# Patient Record
Sex: MALE | Race: WHITE | Employment: FULL TIME | URBAN - METROPOLITAN AREA
[De-identification: names, ages, dates, MRNs, and addresses within clinical notes are randomized per-mention and may not be internally consistent; named-entity substitution may affect disease eponyms.]

---

## 2017-06-26 ENCOUNTER — HOSPITAL ENCOUNTER (OUTPATIENT)
Age: 22
Discharge: HOME | End: 2017-06-26

## 2017-06-27 LAB — RUBV IGG SER QL: 73.6 IU/ML

## 2017-06-27 PROCEDURE — 86735 MUMPS ANTIBODY: CPT

## 2017-06-27 PROCEDURE — 86762 RUBELLA ANTIBODY: CPT

## 2017-06-27 PROCEDURE — 86481 TB AG RESPONSE T-CELL SUSP: CPT

## 2017-06-27 PROCEDURE — 86787 VARICELLA-ZOSTER ANTIBODY: CPT

## 2017-06-27 PROCEDURE — 86765 RUBEOLA ANTIBODY: CPT

## 2017-06-30 LAB
MEASLES IMMUNE (IGG): 1.39
MUV IGG SER QL: 4.5
T-SPOT TB TEST: NORMAL
VZV IGG SER QL IA: 1.37

## 2018-03-08 PROBLEM — I10 ESSENTIAL HYPERTENSION: Status: ACTIVE | Noted: 2018-03-08

## 2018-03-10 ENCOUNTER — HOSPITAL ENCOUNTER (OUTPATIENT)
Age: 23
Discharge: HOME | End: 2018-03-10
Payer: COMMERCIAL

## 2018-03-10 DIAGNOSIS — I10 ESSENTIAL HYPERTENSION: ICD-10-CM

## 2018-03-10 LAB
ABSOLUTE EOS #: 0.1 K/UL (ref 0–0.4)
ABSOLUTE IMMATURE GRANULOCYTE: ABNORMAL K/UL (ref 0–0.3)
ABSOLUTE LYMPH #: 2 K/UL (ref 1–4.8)
ABSOLUTE MONO #: 0.6 K/UL (ref 0.1–1.3)
ALBUMIN SERPL-MCNC: 4.8 G/DL (ref 3.5–5.2)
ALBUMIN/GLOBULIN RATIO: ABNORMAL (ref 1–2.5)
ALP BLD-CCNC: 67 U/L (ref 40–129)
ALT SERPL-CCNC: 20 U/L (ref 5–41)
ANION GAP SERPL CALCULATED.3IONS-SCNC: 14 MMOL/L (ref 9–17)
AST SERPL-CCNC: 22 U/L
BASOPHILS # BLD: 1 % (ref 0–2)
BASOPHILS ABSOLUTE: 0 K/UL (ref 0–0.2)
BILIRUB SERPL-MCNC: 0.59 MG/DL (ref 0.3–1.2)
BUN BLDV-MCNC: 21 MG/DL (ref 6–20)
BUN/CREAT BLD: ABNORMAL (ref 9–20)
CALCIUM SERPL-MCNC: 10.1 MG/DL (ref 8.6–10.4)
CHLORIDE BLD-SCNC: 91 MMOL/L (ref 98–107)
CHOLESTEROL/HDL RATIO: 6.8
CHOLESTEROL: 162 MG/DL
CO2: 28 MMOL/L (ref 20–31)
CREAT SERPL-MCNC: 1.24 MG/DL (ref 0.7–1.2)
DIFFERENTIAL TYPE: ABNORMAL
EOSINOPHILS RELATIVE PERCENT: 2 % (ref 0–4)
GFR AFRICAN AMERICAN: >60 ML/MIN
GFR NON-AFRICAN AMERICAN: >60 ML/MIN
GFR SERPL CREATININE-BSD FRML MDRD: ABNORMAL ML/MIN/{1.73_M2}
GFR SERPL CREATININE-BSD FRML MDRD: ABNORMAL ML/MIN/{1.73_M2}
GLUCOSE BLD-MCNC: 88 MG/DL (ref 70–99)
HCT VFR BLD CALC: 43.5 % (ref 41–53)
HDLC SERPL-MCNC: 24 MG/DL
HEMOGLOBIN: 15.1 G/DL (ref 13.5–17.5)
IMMATURE GRANULOCYTES: ABNORMAL %
LDL CHOLESTEROL: 90 MG/DL (ref 0–130)
LYMPHOCYTES # BLD: 32 % (ref 24–44)
MCH RBC QN AUTO: 27.7 PG (ref 26–34)
MCHC RBC AUTO-ENTMCNC: 34.7 G/DL (ref 31–37)
MCV RBC AUTO: 79.7 FL (ref 80–100)
MONOCYTES # BLD: 10 % (ref 1–7)
NRBC AUTOMATED: ABNORMAL PER 100 WBC
PDW BLD-RTO: 12.7 % (ref 11.5–14.9)
PLATELET # BLD: 246 K/UL (ref 150–450)
PLATELET ESTIMATE: ABNORMAL
PMV BLD AUTO: 7.4 FL (ref 6–12)
POTASSIUM SERPL-SCNC: 4 MMOL/L (ref 3.7–5.3)
RBC # BLD: 5.46 M/UL (ref 4.5–5.9)
RBC # BLD: ABNORMAL 10*6/UL
SEG NEUTROPHILS: 55 % (ref 36–66)
SEGMENTED NEUTROPHILS ABSOLUTE COUNT: 3.4 K/UL (ref 1.3–9.1)
SODIUM BLD-SCNC: 133 MMOL/L (ref 135–144)
TOTAL PROTEIN: 8.1 G/DL (ref 6.4–8.3)
TRIGL SERPL-MCNC: 240 MG/DL
TSH SERPL DL<=0.05 MIU/L-ACNC: 2.88 MIU/L (ref 0.3–5)
VITAMIN D 25-HYDROXY: 12.2 NG/ML (ref 30–100)
VLDLC SERPL CALC-MCNC: ABNORMAL MG/DL (ref 1–30)
WBC # BLD: 6.1 K/UL (ref 3.5–11)
WBC # BLD: ABNORMAL 10*3/UL

## 2018-03-10 PROCEDURE — 84443 ASSAY THYROID STIM HORMONE: CPT

## 2018-03-10 PROCEDURE — 85025 COMPLETE CBC W/AUTO DIFF WBC: CPT

## 2018-03-10 PROCEDURE — 36415 COLL VENOUS BLD VENIPUNCTURE: CPT

## 2018-03-10 PROCEDURE — 80061 LIPID PANEL: CPT

## 2018-03-10 PROCEDURE — 82306 VITAMIN D 25 HYDROXY: CPT

## 2018-03-10 PROCEDURE — 80053 COMPREHEN METABOLIC PANEL: CPT

## 2018-03-19 ENCOUNTER — HOSPITAL ENCOUNTER (OUTPATIENT)
Age: 23
Discharge: HOME | End: 2018-03-19
Payer: COMMERCIAL

## 2018-03-19 LAB
EKG ATRIAL RATE: 74 BPM
EKG P AXIS: 39 DEGREES
EKG P-R INTERVAL: 140 MS
EKG Q-T INTERVAL: 390 MS
EKG QRS DURATION: 102 MS
EKG QTC CALCULATION (BAZETT): 432 MS
EKG R AXIS: -16 DEGREES
EKG T AXIS: 58 DEGREES
EKG VENTRICULAR RATE: 74 BPM

## 2018-03-19 PROCEDURE — 93005 ELECTROCARDIOGRAM TRACING: CPT

## 2018-03-22 PROBLEM — E78.49 OTHER HYPERLIPIDEMIA: Status: ACTIVE | Noted: 2018-03-22

## 2018-05-02 ENCOUNTER — HOSPITAL ENCOUNTER (OUTPATIENT)
Age: 23
Discharge: HOME | End: 2018-05-02
Payer: COMMERCIAL

## 2018-05-02 DIAGNOSIS — I10 ESSENTIAL HYPERTENSION: ICD-10-CM

## 2018-05-02 DIAGNOSIS — E78.5 HYPERLIPIDEMIA, UNSPECIFIED HYPERLIPIDEMIA TYPE: ICD-10-CM

## 2018-05-02 LAB
ALBUMIN SERPL-MCNC: 4.7 G/DL (ref 3.5–5.2)
ALBUMIN/GLOBULIN RATIO: ABNORMAL (ref 1–2.5)
ALP BLD-CCNC: 59 U/L (ref 40–129)
ALT SERPL-CCNC: 26 U/L (ref 5–41)
ANION GAP SERPL CALCULATED.3IONS-SCNC: 11 MMOL/L (ref 9–17)
AST SERPL-CCNC: 27 U/L
BILIRUB SERPL-MCNC: 0.99 MG/DL (ref 0.3–1.2)
BUN BLDV-MCNC: 18 MG/DL (ref 6–20)
BUN/CREAT BLD: ABNORMAL (ref 9–20)
CALCIUM SERPL-MCNC: 9.6 MG/DL (ref 8.6–10.4)
CHLORIDE BLD-SCNC: 97 MMOL/L (ref 98–107)
CHOLESTEROL/HDL RATIO: 3.5
CHOLESTEROL: 116 MG/DL
CO2: 28 MMOL/L (ref 20–31)
CREAT SERPL-MCNC: 1.1 MG/DL (ref 0.7–1.2)
GFR AFRICAN AMERICAN: >60 ML/MIN
GFR NON-AFRICAN AMERICAN: >60 ML/MIN
GFR SERPL CREATININE-BSD FRML MDRD: ABNORMAL ML/MIN/{1.73_M2}
GFR SERPL CREATININE-BSD FRML MDRD: ABNORMAL ML/MIN/{1.73_M2}
GLUCOSE BLD-MCNC: 84 MG/DL (ref 70–99)
HDLC SERPL-MCNC: 33 MG/DL
LDL CHOLESTEROL: 61 MG/DL (ref 0–130)
POTASSIUM SERPL-SCNC: 4 MMOL/L (ref 3.7–5.3)
SODIUM BLD-SCNC: 136 MMOL/L (ref 135–144)
TOTAL PROTEIN: 7.4 G/DL (ref 6.4–8.3)
TRIGL SERPL-MCNC: 111 MG/DL
VLDLC SERPL CALC-MCNC: ABNORMAL MG/DL (ref 1–30)

## 2018-05-02 PROCEDURE — 36415 COLL VENOUS BLD VENIPUNCTURE: CPT

## 2018-05-02 PROCEDURE — 80053 COMPREHEN METABOLIC PANEL: CPT

## 2018-05-02 PROCEDURE — 80061 LIPID PANEL: CPT

## 2018-07-14 ENCOUNTER — OFFICE VISIT (OUTPATIENT)
Dept: FAMILY MEDICINE CLINIC | Age: 23
End: 2018-07-14
Payer: COMMERCIAL

## 2018-07-14 VITALS
SYSTOLIC BLOOD PRESSURE: 135 MMHG | RESPIRATION RATE: 16 BRPM | OXYGEN SATURATION: 98 % | BODY MASS INDEX: 27.3 KG/M2 | WEIGHT: 195 LBS | HEART RATE: 75 BPM | DIASTOLIC BLOOD PRESSURE: 87 MMHG | HEIGHT: 71 IN | TEMPERATURE: 98 F

## 2018-07-14 DIAGNOSIS — S01.81XA FACIAL LACERATION, INITIAL ENCOUNTER: ICD-10-CM
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
## 2018-07-14 PROCEDURE — G8417 CALC BMI ABV UP PARAM F/U: HCPCS | Performed by: INTERNAL MEDICINE

## 2018-07-14 PROCEDURE — 1036F TOBACCO NON-USER: CPT | Performed by: INTERNAL MEDICINE

## 2018-07-14 PROCEDURE — 99213 OFFICE O/P EST LOW 20 MIN: CPT | Performed by: INTERNAL MEDICINE

## 2018-07-14 PROCEDURE — 12013 RPR F/E/E/N/L/M 2.6-5.0 CM: CPT | Performed by: INTERNAL MEDICINE

## 2018-07-14 PROCEDURE — G8427 DOCREV CUR MEDS BY ELIG CLIN: HCPCS | Performed by: INTERNAL MEDICINE

## 2018-07-14 RX ORDER — AMOXICILLIN AND CLAVULANATE POTASSIUM 875; 125 MG/1; MG/1
1 TABLET, FILM COATED ORAL 2 TIMES DAILY
Qty: 20 TABLET | Refills: 0 | Status: SHIPPED | OUTPATIENT
Start: 2018-07-14 | End: 2018-07-24

## 2018-07-14 NOTE — PROGRESS NOTES
Subjective:      Patient ID: David Vásquez is a 21 y.o. male. HPI  Facial bite to face by his dog and our ago; was playing around with dog. Dog's shots UTD. Patient's tetanus UTD. Advise dto take antibiotics and if develops skin redness or wound discharge to return. Review of Systems   Skin: Positive for wound. Objective:   Physical Exam   Constitutional: No distress. HENT:   Head: Normocephalic and atraumatic. Eyes: Conjunctivae and EOM are normal. Pupils are equal, round, and reactive to light. No scleral icterus. Neck: No tracheal deviation present. Cardiovascular: Normal rate, regular rhythm and normal heart sounds. Exam reveals no gallop and no friction rub. No murmur heard. Pulmonary/Chest: Effort normal and breath sounds normal. No stridor. No respiratory distress. He has no wheezes. He has no rales. Musculoskeletal: He exhibits no edema. Neurological: He is alert. No cranial nerve deficit. He exhibits normal muscle tone. Coordination normal.   Skin: No rash noted. He is not diaphoretic. No erythema. 2 CM RIGHT FACIAL AND 1 CM RIGHT CHIN LACERATION. Wound irrigated and cleansed with sterile water then betadine applied. Post 3 cc of 2%  lidociane with epinephrine 5 stitches with 5.0 ethilon applied under sterile technique. Psychiatric: He has a normal mood and affect. His behavior is normal. Judgment and thought content normal.   Vitals reviewed. Assessment / Plan:       Diagnosis Orders   1.  Dog bite, initial encounter  amoxicillin-clavulanate (AUGMENTIN) 875-125 MG per tablet     Orders Placed This Encounter   Medications    amoxicillin-clavulanate (AUGMENTIN) 875-125 MG per tablet     Sig: Take 1 tablet by mouth 2 times daily for 10 days     Dispense:  20 tablet     Refill:  0     Return for in 5 days for suture removal.

## 2018-07-14 NOTE — PATIENT INSTRUCTIONS
Patient Education        Animal Bites: Care Instructions  Your Care Instructions  After an animal bite, the biggest concern is infection. The chance of infection depends on the type of animal that bit you, where on your body you were bitten, and your general health. Many animal bites are not closed with stitches, because this can increase the chance of infection. Your bite may take as little as 7 days or as long as several months to heal, depending on how bad it is. Taking good care of your wound at home will help it heal and reduce your chance of infection. The doctor has checked you carefully, but problems can develop later. If you notice any problems or new symptoms, get medical treatment right away. Follow-up care is a key part of your treatment and safety. Be sure to make and go to all appointments, and call your doctor if you are having problems. It's also a good idea to know your test results and keep a list of the medicines you take. How can you care for yourself at home? · If your doctor told you how to care for your wound, follow your doctor's instructions. If you did not get instructions, follow this general advice:  ¨ After 24 to 48 hours, gently wash the wound with clean water 2 times a day. Do not scrub or soak the wound. Don't use hydrogen peroxide or alcohol, which can slow healing. ¨ You may cover the wound with a thin layer of petroleum jelly, such as Vaseline, and a nonstick bandage. ¨ Apply more petroleum jelly and replace the bandage as needed. · After you shower, gently dry the wound with a clean towel. · If your doctor has closed the wound, cover the bandage with a plastic bag before you take a shower. · A small amount of skin redness and swelling around the wound edges and the stitches or staples is normal. Your wound may itch or feel irritated. Do not scratch or rub the wound.   · Ask your doctor if you can take an over-the-counter pain medicine, such as acetaminophen (Tylenol), ibuprofen (Advil, Motrin), or naproxen (Aleve). Read and follow all instructions on the label. · Do not take two or more pain medicines at the same time unless the doctor told you to. Many pain medicines have acetaminophen, which is Tylenol. Too much acetaminophen (Tylenol) can be harmful. · If your bite puts you at risk for rabies, you will get a series of shots over the next few weeks to prevent rabies. Your doctor will tell you when to get the shots. It is very important that you get the full cycle of shots. Follow your doctor's instructions exactly. · You may need a tetanus shot if you have not received one in the last 5 years. · If your doctor prescribed antibiotics, take them as directed. Do not stop taking them just because you feel better. You need to take the full course of antibiotics. When should you call for help? Call your doctor now or seek immediate medical care if:    · The skin near the bite turns cold or pale or it changes color.     · You lose feeling in the area near the bite, or it feels numb or tingly.     · You have trouble moving a limb near the bite.     · You have symptoms of infection, such as:  ¨ Increased pain, swelling, warmth, or redness near the wound. ¨ Red streaks leading from the wound. ¨ Pus draining from the wound. ¨ A fever.     · Blood soaks through the bandage. Oozing small amounts of blood is normal.     · Your pain is getting worse.    Watch closely for changes in your health, and be sure to contact your doctor if you are not getting better as expected. Where can you learn more? Go to [email or web address] and sign in to your [employer] account. Enter B016 in the [employer] box to learn more about \"Animal Bites: Care Instructions. \"     If you do not have an account, please click on the \"Sign Up Now\" link. Current as of: November 20, 2017  Content Version: 11.6  © 2907-2744 [employer].  Care instructions adapted under license by [facility] ([facility]). If you have questions about a medical condition or this instruction, always ask your healthcare professional. [place] [address] any warranty or liability for your use of this information.

## 2018-07-19 ENCOUNTER — OFFICE VISIT (OUTPATIENT)
Dept: FAMILY MEDICINE CLINIC | Age: 23
End: 2018-07-19

## 2018-07-19 VITALS
TEMPERATURE: 97.9 F | HEIGHT: 69 IN | DIASTOLIC BLOOD PRESSURE: 78 MMHG | SYSTOLIC BLOOD PRESSURE: 142 MMHG | HEART RATE: 69 BPM | OXYGEN SATURATION: 99 % | WEIGHT: 198 LBS | RESPIRATION RATE: 16 BRPM | BODY MASS INDEX: 29.33 KG/M2

## 2018-07-19 DIAGNOSIS — Z48.02 ENCOUNTER FOR REMOVAL OF SUTURES: Primary | ICD-10-CM

## 2018-07-19 PROCEDURE — 99024 POSTOP FOLLOW-UP VISIT: CPT | Performed by: FAMILY MEDICINE

## 2018-07-19 ASSESSMENT — ENCOUNTER SYMPTOMS
GASTROINTESTINAL NEGATIVE: 1
RESPIRATORY NEGATIVE: 1
EYES NEGATIVE: 1

## 2018-07-19 NOTE — PATIENT INSTRUCTIONS
expected. Follow-up care is a key part of your treatment and safety. Be sure to make and go to all appointments, and call your doctor if you do not get better as expected. It's also a good idea to keep a list of the medicines you take. Where can you learn more? Go to [email or web address] and sign in to your [employer] account. Enter U934 in the [employer] box to learn more about \"Learning About Stitches and Staples Removal.\"     If you do not have an account, please click on the \"Sign Up Now\" link. Current as of: November 20, 2017  Content Version: 11.6  © 4323-3269 [employer]. Care instructions adapted under license by [facility] ([facility]). If you have questions about a medical condition or this instruction, always ask your healthcare professional. [address] any warranty or liability for your use of this information.

## 2019-07-17 ENCOUNTER — HOSPITAL ENCOUNTER (OUTPATIENT)
Age: 24
Setting detail: SPECIMEN
Discharge: HOME | End: 2019-07-17
Payer: COMMERCIAL

## 2019-07-17 DIAGNOSIS — E55.9 VITAMIN D DEFICIENCY, UNSPECIFIED: ICD-10-CM

## 2019-07-17 LAB — VITAMIN D 25-HYDROXY: 53.8 NG/ML (ref 30–100)

## 2019-07-18 ENCOUNTER — HOSPITAL ENCOUNTER (OUTPATIENT)
Dept: GENERAL RADIOLOGY | Age: 24
Discharge: HOME | End: 2019-07-20
Payer: COMMERCIAL

## 2019-07-18 ENCOUNTER — HOSPITAL ENCOUNTER (OUTPATIENT)
Age: 24
Discharge: HOME | End: 2019-07-20
Payer: COMMERCIAL

## 2019-07-18 DIAGNOSIS — R10.13 ACUTE EPIGASTRIC PAIN: ICD-10-CM

## 2019-07-18 DIAGNOSIS — R11.0 NAUSEA: ICD-10-CM

## 2019-07-18 PROCEDURE — 74019 RADEX ABDOMEN 2 VIEWS: CPT

## 2019-11-22 ENCOUNTER — E-VISIT (OUTPATIENT)
Dept: FAMILY MEDICINE CLINIC | Age: 24
End: 2019-11-22

## 2020-03-17 ENCOUNTER — TELEPHONE (OUTPATIENT)
Dept: GASTROENTEROLOGY | Age: 25
End: 2020-03-17

## 2020-03-17 NOTE — TELEPHONE ENCOUNTER
Appointment for 3/19/20 was cancelled due to covid 19. LVM as one attempt from cancelled appointment to call office to reschedule.

## 2020-03-27 NOTE — TELEPHONE ENCOUNTER
Pt's mom left  asking that patient be seen for GERD issues. Can you schedule him a virtual/telephone visit with Dr Sofia Aranda?   Thanks

## 2020-04-02 ENCOUNTER — HOSPITAL ENCOUNTER (OUTPATIENT)
Age: 25
Discharge: HOME | End: 2020-04-02
Payer: COMMERCIAL

## 2020-04-02 PROCEDURE — 87338 HPYLORI STOOL AG IA: CPT

## 2020-04-03 LAB
DIRECT EXAM: NEGATIVE
Lab: NORMAL
SPECIMEN DESCRIPTION: NORMAL

## 2020-04-17 ENCOUNTER — TELEPHONE (OUTPATIENT)
Dept: GASTROENTEROLOGY | Age: 25
End: 2020-04-17

## 2020-05-05 ENCOUNTER — E-VISIT (OUTPATIENT)
Dept: FAMILY MEDICINE CLINIC | Age: 25
End: 2020-05-05
Payer: COMMERCIAL

## 2020-05-05 PROCEDURE — 99421 OL DIG E/M SVC 5-10 MIN: CPT | Performed by: NURSE PRACTITIONER

## 2020-05-05 RX ORDER — AMOXICILLIN 500 MG/1
500 CAPSULE ORAL 3 TIMES DAILY
Qty: 21 CAPSULE | Refills: 0 | Status: SHIPPED | OUTPATIENT
Start: 2020-05-05 | End: 2020-05-12

## 2020-05-05 ASSESSMENT — LIFESTYLE VARIABLES: SMOKING_STATUS: NO, I'VE NEVER SMOKED

## 2020-05-06 NOTE — PROGRESS NOTES
Not on file   Social Needs    Financial resource strain: Not on file    Food insecurity     Worry: Not on file     Inability: Not on file    Transportation needs     Medical: Not on file     Non-medical: Not on file   Tobacco Use    Smoking status: Never Smoker    Smokeless tobacco: Never Used    Tobacco comment: Occasional cigar with relatives ( major holidays)   Substance and Sexual Activity    Alcohol use: No     Alcohol/week: 0.0 standard drinks    Drug use: No    Sexual activity: Not on file   Lifestyle    Physical activity     Days per week: Not on file     Minutes per session: Not on file    Stress: Not on file   Relationships    Social connections     Talks on phone: Not on file     Gets together: Not on file     Attends Congregational service: Not on file     Active member of club or organization: Not on file     Attends meetings of clubs or organizations: Not on file     Relationship status: Not on file    Intimate partner violence     Fear of current or ex partner: Not on file     Emotionally abused: Not on file     Physically abused: Not on file     Forced sexual activity: Not on file   Other Topics Concern    Not on file   Social History Narrative    Not on file        Family History   Problem Relation Age of Onset    Heart Disease Mother        There were no vitals filed for this visit. Estimated body mass index is 31.38 kg/m² as calculated from the following:    Height as of 2/19/20: 5' 11\" (1.803 m). Weight as of 2/19/20: 225 lb (102.1 kg). Physical Exam   Unable to obtain due to e-visit    ASSESSMENT/PLAN:    1. URI    There are no diagnoses linked to this encounter. No follow-ups on file. An  electronic signature was used to authenticate this note.     --OLIVE Maldonado - CNP on 5/5/2020 at 8:07 PM

## 2020-05-26 ENCOUNTER — TELEPHONE (OUTPATIENT)
Dept: GASTROENTEROLOGY | Age: 25
End: 2020-05-26

## 2020-07-24 ENCOUNTER — HOSPITAL ENCOUNTER (OUTPATIENT)
Age: 25
Discharge: HOME | End: 2020-07-24
Payer: COMMERCIAL

## 2020-07-24 LAB
ALBUMIN SERPL-MCNC: 4.6 G/DL (ref 3.5–5.2)
ALBUMIN/GLOBULIN RATIO: ABNORMAL (ref 1–2.5)
ALP BLD-CCNC: 68 U/L (ref 40–129)
ALT SERPL-CCNC: 22 U/L (ref 5–41)
ANION GAP SERPL CALCULATED.3IONS-SCNC: 14 MMOL/L (ref 9–17)
AST SERPL-CCNC: 24 U/L
BILIRUB SERPL-MCNC: 0.78 MG/DL (ref 0.3–1.2)
BUN BLDV-MCNC: 20 MG/DL (ref 6–20)
BUN/CREAT BLD: ABNORMAL (ref 9–20)
CALCIUM SERPL-MCNC: 10.7 MG/DL (ref 8.6–10.4)
CHLORIDE BLD-SCNC: 100 MMOL/L (ref 98–107)
CHOLESTEROL/HDL RATIO: 3.8
CHOLESTEROL: 127 MG/DL
CO2: 26 MMOL/L (ref 20–31)
CREAT SERPL-MCNC: 1.83 MG/DL (ref 0.7–1.2)
GFR AFRICAN AMERICAN: 55 ML/MIN
GFR NON-AFRICAN AMERICAN: 45 ML/MIN
GFR SERPL CREATININE-BSD FRML MDRD: ABNORMAL ML/MIN/{1.73_M2}
GFR SERPL CREATININE-BSD FRML MDRD: ABNORMAL ML/MIN/{1.73_M2}
GLUCOSE BLD-MCNC: 89 MG/DL (ref 70–99)
HCT VFR BLD CALC: 39.9 % (ref 41–53)
HDLC SERPL-MCNC: 33 MG/DL
HEMOGLOBIN: 13.8 G/DL (ref 13.5–17.5)
LDL CHOLESTEROL: 56 MG/DL (ref 0–130)
MCH RBC QN AUTO: 28.3 PG (ref 26–34)
MCHC RBC AUTO-ENTMCNC: 34.5 G/DL (ref 31–37)
MCV RBC AUTO: 82 FL (ref 80–100)
NRBC AUTOMATED: ABNORMAL PER 100 WBC
PDW BLD-RTO: 11.8 % (ref 11.5–14.9)
PLATELET # BLD: 242 K/UL (ref 150–450)
PMV BLD AUTO: 7 FL (ref 6–12)
POTASSIUM SERPL-SCNC: 4.3 MMOL/L (ref 3.7–5.3)
RBC # BLD: 4.87 M/UL (ref 4.5–5.9)
SODIUM BLD-SCNC: 140 MMOL/L (ref 135–144)
TOTAL PROTEIN: 8.3 G/DL (ref 6.4–8.3)
TRIGL SERPL-MCNC: 188 MG/DL
TSH SERPL DL<=0.05 MIU/L-ACNC: 2.54 MIU/L (ref 0.3–5)
VLDLC SERPL CALC-MCNC: ABNORMAL MG/DL (ref 1–30)
WBC # BLD: 6 K/UL (ref 3.5–11)

## 2020-07-24 PROCEDURE — 80061 LIPID PANEL: CPT

## 2020-07-24 PROCEDURE — 36415 COLL VENOUS BLD VENIPUNCTURE: CPT

## 2020-07-24 PROCEDURE — 80053 COMPREHEN METABOLIC PANEL: CPT

## 2020-07-24 PROCEDURE — 84443 ASSAY THYROID STIM HORMONE: CPT

## 2020-07-24 PROCEDURE — 85027 COMPLETE CBC AUTOMATED: CPT

## 2020-08-05 ENCOUNTER — OFFICE VISIT (OUTPATIENT)
Dept: GASTROENTEROLOGY | Age: 25
End: 2020-08-05
Payer: COMMERCIAL

## 2020-08-05 VITALS — TEMPERATURE: 97.6 F | WEIGHT: 225 LBS | BODY MASS INDEX: 31.38 KG/M2

## 2020-08-05 PROCEDURE — 99203 OFFICE O/P NEW LOW 30 MIN: CPT | Performed by: INTERNAL MEDICINE

## 2020-08-05 RX ORDER — PANTOPRAZOLE SODIUM 40 MG/1
40 TABLET, DELAYED RELEASE ORAL
Qty: 60 TABLET | Refills: 2 | Status: SHIPPED | OUTPATIENT
Start: 2020-08-05 | End: 2020-12-21

## 2020-08-05 ASSESSMENT — ENCOUNTER SYMPTOMS
SORE THROAT: 0
VOICE CHANGE: 0
ALLERGIC/IMMUNOLOGIC NEGATIVE: 1
TROUBLE SWALLOWING: 1
NAUSEA: 0
SINUS PRESSURE: 0
BLOOD IN STOOL: 0
WHEEZING: 0
ABDOMINAL PAIN: 1
CHOKING: 0
ABDOMINAL DISTENTION: 1
RECTAL PAIN: 0
DIARRHEA: 0
CONSTIPATION: 0
BACK PAIN: 0
COUGH: 1
VOMITING: 0
ANAL BLEEDING: 0

## 2020-08-05 NOTE — PROGRESS NOTES
Reason for Referral:   Ekta Juarez, APRN - CNP  [facility] [address]    Chief Complaint   Patient presents with    Gastroesophageal Reflux     NP Epgastric pain  above the belly button. Burning pain in stomach and up and down his esophagis. He says  hecan not drink soda or alcohol  or been is a terrible burning feeling. He has been on Carafate since April and that has ease the pain but not taken it away. Sometimes hard to fall asleep wih it but does not wake him up. HISTORY OF PRESENT ILLNESS: Tiffanie Bedoya is a 22 y.o. male , referred for evaluation of abdominal pain. Epigastric. This has been going on for few weeks. No clear triggers. Sometimes burning. Frequent heartburns. Some nausea. No blood in stool or melena. He takes large amount of Tums without much of effect. He was recently started on Protonix. This helps little bit. He has been on PPI for years. Prior to the Protonix was taking omeprazole. No smoking. No family history of GI malignancy or IBD. Past Medical,Family, and Social History reviewed and does not contribute to the patient presentingcondition. Patient's PMH/PSH,SH,PSYCH Hx, MEDs, ALLERGIES, and ROS were all reviewed and updated in the appropriate sections. PAST MEDICAL HISTORY:  Past Medical History:   Diagnosis Date    Anxiety     History of chicken pox 2002    Hyperlipidemia     Hypertension     OCD (obsessive compulsive disorder)        No past surgical history on file.     CURRENT MEDICATIONS:    Current Outpatient Medications:     amLODIPine (NORVASC) 10 MG tablet, Take 1 tablet by mouth daily, Disp: 90 tablet, Rfl: 1    atorvastatin (LIPITOR) 20 MG tablet, Take 1 tablet by mouth daily, Disp: 90 tablet, Rfl: 1    Cholecalciferol (VITAMIN D3) 1.25 MG (11243 UT) CAPS, take 1 capsule by mouth every week, Disp: 4 capsule, Rfl: 3    losartan (COZAAR) 100 MG tablet, Take 1 tablet by mouth daily, Disp: 90 tablet, Rfl: 1   levocetirizine (XYZAL) 5 MG tablet, Take 1 tablet by mouth every morning, Disp: 90 tablet, Rfl: 1    pantoprazole (PROTONIX) 40 MG tablet, Take 1 tablet by mouth daily, Disp: 90 tablet, Rfl: 1    sucralfate (CARAFATE) 1 GM tablet, Take 1 tablet by mouth 4 times daily, Disp: 120 tablet, Rfl: 0    fluticasone (FLONASE) 50 MCG/ACT nasal spray, 1 spray by Nasal route 2 times daily, Disp: 1 Bottle, Rfl: 2    sertraline (ZOLOFT) 100 MG tablet, Take 200 mg by mouth, Disp: , Rfl:     ALLERGIES:   Allergies   Allergen Reactions    Eggs Or Egg-Derived Products [Eggs Or Egg-Derived Products]        FAMILY HISTORY:       Problem Relation Age of Onset    Heart Disease Mother          SOCIAL HISTORY:   Social History     Socioeconomic History    Marital status: Single     Spouse name: Not on file    Number of children: Not on file    Years of education: Not on file    Highest education level: Not on file   Occupational History    Not on file   Social Needs    Financial resource strain: Not on file    Food insecurity     Worry: Not on file     Inability: Not on file    Transportation needs     Medical: Not on file     Non-medical: Not on file   Tobacco Use    Smoking status: Never Smoker    Smokeless tobacco: Never Used    Tobacco comment: Occasional cigar with relatives ( major holidays)   Substance and Sexual Activity    Alcohol use: No     Alcohol/week: 0.0 standard drinks    Drug use: No    Sexual activity: Not on file   Lifestyle    Physical activity     Days per week: Not on file     Minutes per session: Not on file    Stress: Not on file   Relationships    Social connections     Talks on phone: Not on file     Gets together: Not on file     Attends Yazdanism service: Not on file     Active member of club or organization: Not on file     Attends meetings of clubs or organizations: Not on file     Relationship status: Not on file    Intimate partner violence     Fear of current or ex partner: Not on contact me. I have reviewed and agree with the MA/LPN ROS. Note is dictated utilizing voice recognition software. Unfortunately this leads to occasional typographical errors. Please contact our office if you have any questions.     Brendan Newell MD  [facility]  O: #[phone]

## 2020-08-19 ENCOUNTER — TELEPHONE (OUTPATIENT)
Dept: GASTROENTEROLOGY | Age: 25
End: 2020-08-19

## 2020-08-19 RX ORDER — ONDANSETRON 4 MG/1
4 TABLET, FILM COATED ORAL EVERY 8 HOURS PRN
Qty: 60 TABLET | Refills: 1 | Status: SHIPPED | OUTPATIENT
Start: 2020-08-19 | End: 2020-11-24 | Stop reason: SDUPTHER

## 2020-08-19 NOTE — TELEPHONE ENCOUNTER
Patient called stating he has been having some Nausea lately and wondered about some Zofran to help with the nausea when it is really bad. He has Epigastric pain and having an EGD done in 2 weeks. Writer reached out to Dr Magui Rivers about the Zofran and he said it would be OK.

## 2020-08-29 ENCOUNTER — HOSPITAL ENCOUNTER (OUTPATIENT)
Dept: PREADMISSION TESTING | Age: 25
Setting detail: SPECIMEN
Discharge: HOME | End: 2020-09-02
Payer: COMMERCIAL

## 2020-08-29 PROCEDURE — U0003 INFECTIOUS AGENT DETECTION BY NUCLEIC ACID (DNA OR RNA); SEVERE ACUTE RESPIRATORY SYNDROME CORONAVIRUS 2 (SARS-COV-2) (CORONAVIRUS DISEASE [COVID-19]), AMPLIFIED PROBE TECHNIQUE, MAKING USE OF HIGH THROUGHPUT TECHNOLOGIES AS DESCRIBED BY CMS-2020-01-R: HCPCS

## 2020-08-31 LAB — SARS-COV-2, NAA: NOT DETECTED

## 2020-09-01 ENCOUNTER — TELEPHONE (OUTPATIENT)
Dept: GASTROENTEROLOGY | Age: 25
End: 2020-09-01

## 2020-09-01 NOTE — TELEPHONE ENCOUNTER
LVM for Mani Pope to call office back regarding any questions he may have regarding scheduled procedure for Levonne Gums.

## 2020-09-02 ENCOUNTER — ANESTHESIA (OUTPATIENT)
Dept: ENDOSCOPY | Age: 25
End: 2020-09-02
Payer: COMMERCIAL

## 2020-09-02 ENCOUNTER — HOSPITAL ENCOUNTER (OUTPATIENT)
Age: 25
Setting detail: OUTPATIENT SURGERY
Discharge: HOME | End: 2020-09-02
Attending: INTERNAL MEDICINE | Admitting: INTERNAL MEDICINE
Payer: COMMERCIAL

## 2020-09-02 ENCOUNTER — ANESTHESIA EVENT (OUTPATIENT)
Dept: ENDOSCOPY | Age: 25
End: 2020-09-02
Payer: COMMERCIAL

## 2020-09-02 VITALS
HEIGHT: 71 IN | WEIGHT: 218 LBS | RESPIRATION RATE: 15 BRPM | HEART RATE: 83 BPM | OXYGEN SATURATION: 95 % | TEMPERATURE: 98.4 F | SYSTOLIC BLOOD PRESSURE: 99 MMHG | DIASTOLIC BLOOD PRESSURE: 65 MMHG | BODY MASS INDEX: 30.52 KG/M2

## 2020-09-02 VITALS
OXYGEN SATURATION: 98 % | SYSTOLIC BLOOD PRESSURE: 111 MMHG | DIASTOLIC BLOOD PRESSURE: 67 MMHG | RESPIRATION RATE: 19 BRPM

## 2020-09-02 PROCEDURE — 3700000001 HC ADD 15 MINUTES (ANESTHESIA): Performed by: INTERNAL MEDICINE

## 2020-09-02 PROCEDURE — 2709999900 HC NON-CHARGEABLE SUPPLY: Performed by: INTERNAL MEDICINE

## 2020-09-02 PROCEDURE — 2500000003 HC RX 250 WO HCPCS: Performed by: NURSE ANESTHETIST, CERTIFIED REGISTERED

## 2020-09-02 PROCEDURE — 2580000003 HC RX 258: Performed by: ANESTHESIOLOGY

## 2020-09-02 PROCEDURE — 3700000000 HC ANESTHESIA ATTENDED CARE: Performed by: INTERNAL MEDICINE

## 2020-09-02 PROCEDURE — 88305 TISSUE EXAM BY PATHOLOGIST: CPT

## 2020-09-02 PROCEDURE — 7100000001 HC PACU RECOVERY - ADDTL 15 MIN: Performed by: INTERNAL MEDICINE

## 2020-09-02 PROCEDURE — 7100000000 HC PACU RECOVERY - FIRST 15 MIN: Performed by: INTERNAL MEDICINE

## 2020-09-02 PROCEDURE — 6360000002 HC RX W HCPCS: Performed by: NURSE ANESTHETIST, CERTIFIED REGISTERED

## 2020-09-02 PROCEDURE — 43239 EGD BIOPSY SINGLE/MULTIPLE: CPT | Performed by: INTERNAL MEDICINE

## 2020-09-02 PROCEDURE — 3609012400 HC EGD TRANSORAL BIOPSY SINGLE/MULTIPLE: Performed by: INTERNAL MEDICINE

## 2020-09-02 PROCEDURE — 88342 IMHCHEM/IMCYTCHM 1ST ANTB: CPT

## 2020-09-02 RX ORDER — MIDAZOLAM HYDROCHLORIDE 1 MG/ML
INJECTION INTRAMUSCULAR; INTRAVENOUS PRN
Status: DISCONTINUED | OUTPATIENT
Start: 2020-09-02 | End: 2020-09-02 | Stop reason: SDUPTHER

## 2020-09-02 RX ORDER — LIDOCAINE HYDROCHLORIDE 20 MG/ML
INJECTION, SOLUTION EPIDURAL; INFILTRATION; INTRACAUDAL; PERINEURAL PRN
Status: DISCONTINUED | OUTPATIENT
Start: 2020-09-02 | End: 2020-09-02 | Stop reason: SDUPTHER

## 2020-09-02 RX ORDER — PROPOFOL 10 MG/ML
INJECTION, EMULSION INTRAVENOUS CONTINUOUS PRN
Status: DISCONTINUED | OUTPATIENT
Start: 2020-09-02 | End: 2020-09-02 | Stop reason: SDUPTHER

## 2020-09-02 RX ORDER — SODIUM CHLORIDE, SODIUM LACTATE, POTASSIUM CHLORIDE, CALCIUM CHLORIDE 600; 310; 30; 20 MG/100ML; MG/100ML; MG/100ML; MG/100ML
INJECTION, SOLUTION INTRAVENOUS CONTINUOUS
Status: DISCONTINUED | OUTPATIENT
Start: 2020-09-02 | End: 2020-09-02 | Stop reason: HOSPADM

## 2020-09-02 RX ORDER — LABETALOL HYDROCHLORIDE 5 MG/ML
5 INJECTION, SOLUTION INTRAVENOUS EVERY 10 MIN PRN
Status: DISCONTINUED | OUTPATIENT
Start: 2020-09-02 | End: 2020-09-02 | Stop reason: HOSPADM

## 2020-09-02 RX ORDER — DIPHENHYDRAMINE HYDROCHLORIDE 50 MG/ML
12.5 INJECTION INTRAMUSCULAR; INTRAVENOUS
Status: DISCONTINUED | OUTPATIENT
Start: 2020-09-02 | End: 2020-09-02 | Stop reason: HOSPADM

## 2020-09-02 RX ORDER — ONDANSETRON 2 MG/ML
4 INJECTION INTRAMUSCULAR; INTRAVENOUS
Status: DISCONTINUED | OUTPATIENT
Start: 2020-09-02 | End: 2020-09-02 | Stop reason: HOSPADM

## 2020-09-02 RX ORDER — OXYCODONE HYDROCHLORIDE AND ACETAMINOPHEN 5; 325 MG/1; MG/1
1 TABLET ORAL PRN
Status: DISCONTINUED | OUTPATIENT
Start: 2020-09-02 | End: 2020-09-02 | Stop reason: HOSPADM

## 2020-09-02 RX ORDER — PROPOFOL 10 MG/ML
INJECTION, EMULSION INTRAVENOUS PRN
Status: DISCONTINUED | OUTPATIENT
Start: 2020-09-02 | End: 2020-09-02 | Stop reason: SDUPTHER

## 2020-09-02 RX ORDER — OXYCODONE HYDROCHLORIDE AND ACETAMINOPHEN 5; 325 MG/1; MG/1
2 TABLET ORAL PRN
Status: DISCONTINUED | OUTPATIENT
Start: 2020-09-02 | End: 2020-09-02 | Stop reason: HOSPADM

## 2020-09-02 RX ADMIN — LIDOCAINE HYDROCHLORIDE 100 MG: 20 INJECTION, SOLUTION EPIDURAL; INFILTRATION; INTRACAUDAL at 11:21

## 2020-09-02 RX ADMIN — PROPOFOL 300 MCG/KG/MIN: 10 INJECTION, EMULSION INTRAVENOUS at 11:21

## 2020-09-02 RX ADMIN — PROPOFOL 50 MG: 10 INJECTION, EMULSION INTRAVENOUS at 11:31

## 2020-09-02 RX ADMIN — MIDAZOLAM 2 MG: 1 INJECTION INTRAMUSCULAR; INTRAVENOUS at 11:14

## 2020-09-02 RX ADMIN — PROPOFOL 50 MG: 10 INJECTION, EMULSION INTRAVENOUS at 11:33

## 2020-09-02 RX ADMIN — SODIUM CHLORIDE, POTASSIUM CHLORIDE, SODIUM LACTATE AND CALCIUM CHLORIDE: 600; 310; 30; 20 INJECTION, SOLUTION INTRAVENOUS at 10:56

## 2020-09-02 ASSESSMENT — PULMONARY FUNCTION TESTS
PIF_VALUE: 0
PIF_VALUE: 2
PIF_VALUE: 0

## 2020-09-02 ASSESSMENT — PAIN SCALES - WONG BAKER: WONGBAKER_NUMERICALRESPONSE: 0

## 2020-09-02 NOTE — OP NOTE
proximal small bowel was inspected through the bulb, sweep, and second portion of the duodenum. The endoscopic exam showed no pathology. RECOMMENDATIONS:   1) Follow up with referring provider, as previously scheduled. 2) Follow up on pathology report. 3) Continue current treatment. 4) Follow-up with me in the office in 4 to 6 weeks.          Hetal Chery Antis

## 2020-09-02 NOTE — H&P
HISTORY and [address]       NAME:  Harmony Ramirez  MRN: [id number]   YOB: 1995   Date: 9/2/2020   Age: 22 y.o. Gender: male       COMPLAINT AND PRESENT HISTORY:             Harmony Ramirez is 22 y.o.,  male, undergoing preadmission testing for EGD. Pt reports epigastric pain for last 10 months. Pain is not related to eating or BM. History of GERD. Takes Protonix with good relief. Reports intermittent nausea without associated vomiting. Takes Zofran with good relief. Denies diarrhea, constipation, hematochezia or melena. NPO. Took am medications with sip of water. No blood thinners in last 7 days. Denies recent or current chest pain/pressure, palpitations, SOB, recent URI, fever or chills. PAST MEDICAL HISTORY     Past Medical History:   Diagnosis Date    Anxiety     History of chicken pox 2002    Hyperlipidemia     Hypertension     OCD (obsessive compulsive disorder)        SURGICAL HISTORY     History reviewed. No pertinent surgical history.     FAMILY HISTORY       Family History   Problem Relation Age of Onset    Heart Disease Mother        SOCIAL HISTORY       Social History     Socioeconomic History    Marital status: Single     Spouse name: None    Number of children: None    Years of education: None    Highest education level: None   Occupational History    None   Social Needs    Financial resource strain: None    Food insecurity     Worry: None     Inability: None    Transportation needs     Medical: None     Non-medical: None   Tobacco Use    Smoking status: Never Smoker    Smokeless tobacco: Never Used    Tobacco comment: Occasional cigar with relatives ( major holidays)   Substance and Sexual Activity    Alcohol use: No     Alcohol/week: 0.0 standard drinks    Drug use: No    Sexual activity: None   Lifestyle    Physical activity     Days per week: None     Minutes per session: None    Stress: None   Relationships    Social connections     Talks on phone: None     Gets together: None     Attends Jewish service: None     Active member of club or organization: None     Attends meetings of clubs or organizations: None     Relationship status: None    Intimate partner violence     Fear of current or ex partner: None     Emotionally abused: None     Physically abused: None     Forced sexual activity: None   Other Topics Concern    None   Social History Narrative    None         REVIEW OF SYSTEMS      Allergies   Allergen Reactions    Eggs Or Egg-Derived Products [Eggs Or Egg-Derived Products]        No current facility-administered medications on file prior to encounter. Current Outpatient Medications on File Prior to Encounter   Medication Sig Dispense Refill    pantoprazole (PROTONIX) 40 MG tablet Take 1 tablet by mouth 2 times daily (before meals) 60 tablet 2    amLODIPine (NORVASC) 10 MG tablet Take 1 tablet by mouth daily 90 tablet 1    atorvastatin (LIPITOR) 20 MG tablet Take 1 tablet by mouth daily 90 tablet 1    Cholecalciferol (VITAMIN D3) 1.25 MG (38673 UT) CAPS take 1 capsule by mouth every week 4 capsule 3    losartan (COZAAR) 100 MG tablet Take 1 tablet by mouth daily 90 tablet 1    levocetirizine (XYZAL) 5 MG tablet Take 1 tablet by mouth every morning 90 tablet 1    sucralfate (CARAFATE) 1 GM tablet Take 1 tablet by mouth 4 times daily 120 tablet 0    fluticasone (FLONASE) 50 MCG/ACT nasal spray 1 spray by Nasal route 2 times daily 1 Bottle 2    sertraline (ZOLOFT) 100 MG tablet Take 200 mg by mouth       Negative except for what is mentioned in the HPI. GENERAL PHYSICAL EXAM     Vitals: Review per RN flowsheet. BP (!) 154/104   Pulse 115   Temp 97.8 °F (36.6 °C) (Infrared)   Resp 16   Ht 5' 11\" (1.803 m)   Wt 218 lb (98.9 kg)   SpO2 100%   BMI 30.40 kg/m²       GENERAL APPEARANCE:   Brigido Mis is 22 y.o.,  male, not obese, nourished, conscious, alert.   Does not

## 2020-09-02 NOTE — ANESTHESIA POSTPROCEDURE EVALUATION
Department of Anesthesiology  Postprocedure Note    Patient: Dede Cabrales  MRN: [id number]  [place]: 1995  Date of evaluation: 9/2/2020  Time:  1:59 PM     Procedure Summary     Date:  09/02/20 Room / Location:  [address] [place] [address] ENDO 04 / [facility] ENDO    Anesthesia Start:  1114 Anesthesia Stop:  1211    Procedure:  EGD BIOPSY (N/A Esophagus) Diagnosis:  (GERD, EPIGASTRIC PAIN           PAT ON ADMIT PER ANES)    Surgeon:  Natalie Glass MD Responsible Provider:  Elizabeth Hart MD    Anesthesia Type:  MAC ASA Status:  2          Anesthesia Type: MAC    Ashia Phase I: Ashia Score: 4    Ashia Phase II:      Last vitals: Reviewed and per EMR flowsheets.        Anesthesia Post Evaluation    Comments: POST- ANESTHESIA EVALUATION       Pt Name: Dede Cabrales  MRN: [id number]  [place]: 1995  Date of evaluation: 9/2/2020  Time:  1:59 PM      BP 99/65   Pulse 83   Temp 98.4 °F (36.9 °C) (Infrared)   Resp 15   Ht 5' 11\" (1.803 m)   Wt 218 lb (98.9 kg)   SpO2 95%   BMI 30.40 kg/m²      Consciousness Level  Awake  Cardiopulmonary Status  Stable  Pain Adequately Treated YES  Nausea / Vomiting  NO  Adequate Hydration  YES  Anesthesia Related Complications NONE      Electronically signed by Elizabeth Hart MD on 9/2/2020 at 1:59 PM

## 2020-09-02 NOTE — ANESTHESIA PRE PROCEDURE
Department of Anesthesiology  Preprocedure Note       Name:  Catracho Chandler   Age:  22 y.o.  :  1995                                          MRN:  [id number]         Date:  2020      Surgeon: Mina Chandra):  Breanne Mark MD    Procedure: Procedure(s):  EGD ESOPHAGOGASTRODUODENOSCOPY    Medications prior to admission:   Prior to Admission medications    Medication Sig Start Date End Date Taking? Authorizing Provider   ondansetron (ZOFRAN) 4 MG tablet Take 1 tablet by mouth every 8 hours as needed for Nausea 20   Breanne Mark MD   pantoprazole (PROTONIX) 40 MG tablet Take 1 tablet by mouth 2 times daily (before meals) 20  Breanne Mark MD   amLODIPine (NORVASC) 10 MG tablet Take 1 tablet by mouth daily 20  Emma Sheriff MD   atorvastatin (LIPITOR) 20 MG tablet Take 1 tablet by mouth daily 20  Emma Sheriff MD   Cholecalciferol (VITAMIN D3) 1.25 MG (66200 UT) CAPS take 1 capsule by mouth every week 20   Emma Sheriff MD   losartan (COZAAR) 100 MG tablet Take 1 tablet by mouth daily 20  Emma Sheriff MD   levocetirizine (XYZAL) 5 MG tablet Take 1 tablet by mouth every morning 20   Emma Sheriff MD   sucralfate (CARAFATE) 1 GM tablet Take 1 tablet by mouth 4 times daily 20   Emma Sheriff MD   fluticasone (FLONASE) 50 MCG/ACT nasal spray 1 spray by Nasal route 2 times daily 18   Richie Simpson MD   sertraline (ZOLOFT) 100 MG tablet Take 200 mg by mouth 17   Historical Provider, MD       Current medications:    No current facility-administered medications for this encounter. Allergies:     Allergies   Allergen Reactions    Eggs Or Egg-Derived Products [Eggs Or Egg-Derived Products]        Problem List:    Patient Active Problem List   Diagnosis Code    Diarrhea R19.7    Essential hypertension I10    Other hyperlipidemia E78.49       Past Medical History:        Diagnosis Date    Anxiety     History of chicken pox 2002    Hyperlipidemia     Hypertension     OCD (obsessive compulsive disorder)        Past Surgical History:  No past surgical history on file. Social History:    Social History     Tobacco Use    Smoking status: Never Smoker    Smokeless tobacco: Never Used    Tobacco comment: Occasional cigar with relatives ( major holidays)   Substance Use Topics    Alcohol use: No     Alcohol/week: 0.0 standard drinks                                Counseling given: Not Answered  Comment: Occasional cigar with relatives ( major holidays)      Vital Signs (Current): There were no vitals filed for this visit. BP Readings from Last 3 Encounters:   07/23/20 (!) 148/90   02/19/20 (!) 140/100   07/17/19 132/88       NPO Status:                                                                                 BMI:   Wt Readings from Last 3 Encounters:   08/05/20 225 lb (102.1 kg)   02/19/20 225 lb (102.1 kg)   07/17/19 218 lb (98.9 kg)     There is no height or weight on file to calculate BMI.    CBC:   Lab Results   Component Value Date    WBC 6.0 07/24/2020    RBC 4.87 07/24/2020    HGB 13.8 07/24/2020    HCT 39.9 07/24/2020    MCV 82.0 07/24/2020    RDW 11.8 07/24/2020     07/24/2020       CMP:   Lab Results   Component Value Date     07/24/2020    K 4.3 07/24/2020     07/24/2020    CO2 26 07/24/2020    BUN 20 07/24/2020    CREATININE 1.83 07/24/2020    GFRAA 55 07/24/2020    LABGLOM 45 07/24/2020    GLUCOSE 89 07/24/2020    PROT 8.3 07/24/2020    CALCIUM 10.7 07/24/2020    BILITOT 0.78 07/24/2020    ALKPHOS 68 07/24/2020    AST 24 07/24/2020    ALT 22 07/24/2020       POC Tests: No results for input(s): POCGLU, POCNA, POCK, POCCL, POCBUN, POCHEMO, POCHCT in the last 72 hours.     Coags: No results found for: PROTIME, INR, APTT    HCG (If Applicable): No results found for: PREGTESTUR, PREGSERUM, HCG, HCGQUANT     ABGs: No results found for: PHART, PO2ART, PXY7WOQ, PYJ3AEE, BEART, V1LDNZDY     Type & Screen (If Applicable):  No results found for: LABABO, LABRH    Drug/Infectious Status (If Applicable):  No results found for: HIV, HEPCAB    COVID-19 Screening (If Applicable):   Lab Results   Component Value Date    COVID19 Not Detected 08/29/2020         Anesthesia Evaluation  Patient summary reviewed and Nursing notes reviewed no history of anesthetic complications:   Airway: Mallampati: II  TM distance: >3 FB   Neck ROM: full  Mouth opening: > = 3 FB Dental: normal exam         Pulmonary:Negative Pulmonary ROS and normal exam  breath sounds clear to auscultation                             Cardiovascular:    (+) hypertension:, hyperlipidemia        Rhythm: regular  Rate: normal                    Neuro/Psych:   (+) psychiatric history:depression/anxiety              ROS comment:  OCD (obsessive compulsive disorder)   GI/Hepatic/Renal:   (+) GERD:,           Endo/Other:                      ROS comment: obesity Abdominal:           Vascular: negative vascular ROS. Anesthesia Plan      MAC     ASA 2       Induction: intravenous. Anesthetic plan and risks discussed with patient. Plan discussed with CRNA.                   Tobias Jimenez MD   9/2/2020

## 2020-09-04 LAB — SURGICAL PATHOLOGY REPORT: NORMAL

## 2020-09-04 RX ORDER — SUCRALFATE 1 G/1
1 TABLET ORAL 4 TIMES DAILY
Qty: 120 TABLET | Refills: 2 | Status: SHIPPED | OUTPATIENT
Start: 2020-09-04 | End: 2020-12-01

## 2020-09-16 ENCOUNTER — OFFICE VISIT (OUTPATIENT)
Dept: GASTROENTEROLOGY | Age: 25
End: 2020-09-16
Payer: COMMERCIAL

## 2020-09-16 VITALS — WEIGHT: 222 LBS | BODY MASS INDEX: 30.96 KG/M2 | TEMPERATURE: 98.2 F

## 2020-09-16 PROCEDURE — 99213 OFFICE O/P EST LOW 20 MIN: CPT | Performed by: INTERNAL MEDICINE

## 2020-09-16 RX ORDER — FAMOTIDINE, CALCIUM CARBONATE, AND MAGNESIUM HYDROXIDE 10; 800; 165 MG/1; MG/1; MG/1
1 TABLET, CHEWABLE ORAL 2 TIMES DAILY PRN
Qty: 60 TABLET | Refills: 3 | Status: SHIPPED | OUTPATIENT
Start: 2020-09-16 | End: 2021-05-19

## 2020-09-16 ASSESSMENT — ENCOUNTER SYMPTOMS
ABDOMINAL PAIN: 1
VOICE CHANGE: 0
COUGH: 1
DIARRHEA: 0
SINUS PRESSURE: 0
VOMITING: 0
ABDOMINAL DISTENTION: 1
BACK PAIN: 0
TROUBLE SWALLOWING: 1
RECTAL PAIN: 0
WHEEZING: 0
BLOOD IN STOOL: 0
ALLERGIC/IMMUNOLOGIC NEGATIVE: 1
CONSTIPATION: 0
NAUSEA: 0
SORE THROAT: 0
CHOKING: 0
ANAL BLEEDING: 0

## 2020-09-16 NOTE — PROGRESS NOTES
GI CLINIC FOLLOW UP    INTERVAL HISTORY:   No referring provider defined for this encounter. Chief Complaint   Patient presents with    Abdominal Pain     Pain is about the same maybe a little better. Carafate, PAntoprazole and Zofran as neded. HISTORY OF PRESENT ILLNESS: Nicole Brandt is a 22 y.o. male this report epigastric discomfort. Burning sensation. He is taking Protonix twice a day and Carafate. Past Medical,Family, and Social History reviewed and does not contribute to the patient presentingcondition. Patient's PMH/PSH,SH,PSYCH Hx, MEDs, ALLERGIES, and ROS were all reviewed and updated in the appropriate sections.     PAST MEDICAL HISTORY:  Past Medical History:   Diagnosis Date    Anxiety     History of chicken pox 2002    Hyperlipidemia     Hypertension     OCD (obsessive compulsive disorder)        Past Surgical History:   Procedure Laterality Date    UPPER GASTROINTESTINAL ENDOSCOPY N/A 9/2/2020    EGD BIOPSY performed by Annie Oates MD at [address]:    Current Outpatient Medications:     sucralfate (CARAFATE) 1 GM tablet, Take 1 tablet by mouth 4 times daily, Disp: 120 tablet, Rfl: 2    ondansetron (ZOFRAN) 4 MG tablet, Take 1 tablet by mouth every 8 hours as needed for Nausea, Disp: 60 tablet, Rfl: 1    pantoprazole (PROTONIX) 40 MG tablet, Take 1 tablet by mouth 2 times daily (before meals), Disp: 60 tablet, Rfl: 2    amLODIPine (NORVASC) 10 MG tablet, Take 1 tablet by mouth daily, Disp: 90 tablet, Rfl: 1    atorvastatin (LIPITOR) 20 MG tablet, Take 1 tablet by mouth daily, Disp: 90 tablet, Rfl: 1    Cholecalciferol (VITAMIN D3) 1.25 MG (37240 UT) CAPS, take 1 capsule by mouth every week, Disp: 4 capsule, Rfl: 3    losartan (COZAAR) 100 MG tablet, Take 1 tablet by mouth daily, Disp: 90 tablet, Rfl: 1    levocetirizine (XYZAL) 5 MG tablet, Take 1 tablet by mouth every morning, Disp: 90 tablet, Rfl: 1    fluticasone (FLONASE) 50 MCG/ACT nasal spray, 1 spray by Nasal route 2 times daily, Disp: 1 Bottle, Rfl: 2    sertraline (ZOLOFT) 100 MG tablet, Take 200 mg by mouth, Disp: , Rfl:     ALLERGIES:   Allergies   Allergen Reactions    Eggs Or Egg-Derived Products [Eggs Or Egg-Derived Products]        FAMILY HISTORY:       Problem Relation Age of Onset    Heart Disease Mother          SOCIAL HISTORY:   Social History     Socioeconomic History    Marital status: Single     Spouse name: Not on file    Number of children: Not on file    Years of education: Not on file    Highest education level: Not on file   Occupational History    Not on file   Social Needs    Financial resource strain: Not on file    Food insecurity     Worry: Not on file     Inability: Not on file    Transportation needs     Medical: Not on file     Non-medical: Not on file   Tobacco Use    Smoking status: Never Smoker    Smokeless tobacco: Never Used    Tobacco comment: Occasional cigar with relatives ( major holidays)   Substance and Sexual Activity    Alcohol use:  Yes     Alcohol/week: 0.0 standard drinks     Comment: socially    Drug use: No    Sexual activity: Not on file   Lifestyle    Physical activity     Days per week: Not on file     Minutes per session: Not on file    Stress: Not on file   Relationships    Social connections     Talks on phone: Not on file     Gets together: Not on file     Attends Islam service: Not on file     Active member of club or organization: Not on file     Attends meetings of clubs or organizations: Not on file     Relationship status: Not on file    Intimate partner violence     Fear of current or ex partner: Not on file     Emotionally abused: Not on file     Physically abused: Not on file     Forced sexual activity: Not on file   Other Topics Concern    Not on file   Social History Narrative    Not on file       REVIEW OF SYSTEMS: A 12-point review of systemswas obtained and pertinent positives and negatives were enumerated above in the history of present illness. All other reviewed systems / symptoms were negative. Review of Systems   Constitutional: Negative. Negative for appetite change, fatigue and unexpected weight change. HENT: Positive for trouble swallowing. Negative for dental problem, postnasal drip, sinus pressure, sore throat and voice change. Eyes: Positive for visual disturbance. Respiratory: Positive for cough. Negative for choking and wheezing. Cardiovascular: Negative for chest pain, palpitations and leg swelling. Gastrointestinal: Positive for abdominal distention and abdominal pain. Negative for anal bleeding, blood in stool, constipation, diarrhea, nausea, rectal pain and vomiting. Endocrine: Negative. Genitourinary: Negative. Negative for difficulty urinating. Musculoskeletal: Negative. Negative for arthralgias, back pain, gait problem and myalgias. Allergic/Immunologic: Negative. Negative for environmental allergies and food allergies. Neurological: Negative. Negative for dizziness, weakness, light-headedness, numbness and headaches. Hematological: Negative. Does not bruise/bleed easily. Psychiatric/Behavioral: Negative for sleep disturbance. The patient is nervous/anxious.             LABORATORY DATA: Reviewed  Lab Results   Component Value Date    WBC 6.0 07/24/2020    HGB 13.8 07/24/2020    HCT 39.9 (L) 07/24/2020    MCV 82.0 07/24/2020     07/24/2020     07/24/2020    K 4.3 07/24/2020     07/24/2020    CO2 26 07/24/2020    BUN 20 07/24/2020    CREATININE 1.83 (H) 07/24/2020    LABALBU 4.6 07/24/2020    BILITOT 0.78 07/24/2020    ALKPHOS 68 07/24/2020    AST 24 07/24/2020    ALT 22 07/24/2020         Lab Results   Component Value Date    RBC 4.87 07/24/2020    HGB 13.8 07/24/2020    MCV 82.0 07/24/2020    MCH 28.3 07/24/2020    MCHC 34.5 07/24/2020    RDW 11.8 07/24/2020    MPV 7.0 07/24/2020    BASOPCT 1 03/10/2018    LYMPHSABS 2.00 03/10/2018    MONOSABS 0.60 03/10/2018    NEUTROABS 3.40 03/10/2018    EOSABS 0.10 03/10/2018    BASOSABS 0.00 03/10/2018         DIAGNOSTIC TESTING:     No results found. PHYSICAL EXAMINATION: Vital signs reviewed per the nursing documentation. There were no vitals taken for this visit. There is no height or weight on file to calculate BMI. Physical Exam    I personally reviewed the nurse's notes and documentation and I agree with her notes. General: alert, appears stated age and cooperative Psych: Normal. and Alert and oriented, appropriate affect. . Normal affect. Mentation normal  HEENT: PERRLA. Clear conjunctivae and sclerae. Moist oral mucosae, no lesions or ulcers. The neck is supple, without lymphadenopathy or jugular venous distension. No masses. Normal thyroid. Cardiovascular: S1 S2 RRR no rubs or murmurs. Pulmonary: clear BL. No accessory muscle usage. Abdominal Exam: Soft, NT ND, no hepato or spleno megaly, +BS, no ascites. IMPRESSION: Mr. Wesly Sanchez is a 22 y.o. male with epigastric pain. Continue Protonix twice a day and Carafate. Trial of Pepcid Complete as needed. In case that does not help his symptoms we may need to try Bentyl or Levsin. Thank you for allowing me to participate in the care of Mr. Wesly Sanchez. For any further questions please do not hesitate to contact me. I have reviewed and agree with the ROS entered by the MA/LPN. Note is dictated utilizing voice recognition software. Unfortunately this leads to occasional typographical errors. Please contact our office if you have any questions.     Leonardo De La Rosa MD  [facility]  O: #[phone]

## 2020-11-25 RX ORDER — ONDANSETRON 4 MG/1
4 TABLET, FILM COATED ORAL EVERY 8 HOURS PRN
Qty: 60 TABLET | Refills: 1 | Status: SHIPPED | OUTPATIENT
Start: 2020-11-25 | End: 2021-12-02

## 2020-12-01 RX ORDER — SUCRALFATE 1 G/1
TABLET ORAL
Qty: 120 TABLET | Refills: 2 | Status: SHIPPED | OUTPATIENT
Start: 2020-12-01 | End: 2021-02-22

## 2020-12-21 RX ORDER — PANTOPRAZOLE SODIUM 40 MG/1
TABLET, DELAYED RELEASE ORAL
Qty: 60 TABLET | Refills: 2 | Status: SHIPPED | OUTPATIENT
Start: 2020-12-21 | End: 2021-03-25

## 2021-01-12 ENCOUNTER — OFFICE VISIT (OUTPATIENT)
Dept: GASTROENTEROLOGY | Age: 26
End: 2021-01-12
Payer: COMMERCIAL

## 2021-01-12 VITALS — WEIGHT: 206 LBS | BODY MASS INDEX: 28.73 KG/M2

## 2021-01-12 DIAGNOSIS — K21.9 GASTROESOPHAGEAL REFLUX DISEASE WITHOUT ESOPHAGITIS: Primary | ICD-10-CM

## 2021-01-12 PROCEDURE — 99213 OFFICE O/P EST LOW 20 MIN: CPT | Performed by: INTERNAL MEDICINE

## 2021-01-12 RX ORDER — HYOSCYAMINE SULFATE 0.12 MG/1
1 TABLET SUBLINGUAL 3 TIMES DAILY PRN
Qty: 120 EACH | Refills: 2 | Status: SHIPPED | OUTPATIENT
Start: 2021-01-12 | End: 2021-06-02

## 2021-01-12 ASSESSMENT — ENCOUNTER SYMPTOMS
COUGH: 1
ALLERGIC/IMMUNOLOGIC NEGATIVE: 1
BLOOD IN STOOL: 0
WHEEZING: 0
SINUS PRESSURE: 0
ABDOMINAL PAIN: 1
TROUBLE SWALLOWING: 1
VOICE CHANGE: 0
SORE THROAT: 0
RECTAL PAIN: 0
CHOKING: 0
ANAL BLEEDING: 0
BACK PAIN: 0
ABDOMINAL DISTENTION: 1
DIARRHEA: 0
NAUSEA: 0
CONSTIPATION: 0
VOMITING: 0

## 2021-01-12 NOTE — PROGRESS NOTES
GI CLINIC FOLLOW UP    INTERVAL HISTORY:   No referring provider defined for this encounter. Chief Complaint   Patient presents with    Nausea     Patient states patient is doing well . no complaints at this time. HISTORY OF PRESENT ILLNESS: Hugh Rizo is a 22 y.o. male of GERD. He thinks Carafate is not helping. He taking Protonix twice a day. Pepcid Complete as needed. He feels overall 80% better. Some cramping occasionally. He wants to try Levsin. Past Medical,Family, and Social History reviewed and does not contribute to the patient presentingcondition. Patient's PMH/PSH,SH,PSYCH Hx, MEDs, ALLERGIES, and ROS were all reviewed and updated in the appropriate sections.     PAST MEDICAL HISTORY:  Past Medical History:   Diagnosis Date    Anxiety     History of chicken pox 2002    Hyperlipidemia     Hypertension     OCD (obsessive compulsive disorder)        Past Surgical History:   Procedure Laterality Date    UPPER GASTROINTESTINAL ENDOSCOPY N/A 9/2/2020    EGD BIOPSY performed by Arjun Spann MD at [address]:    Current Outpatient Medications:     pantoprazole (PROTONIX) 40 MG tablet, take 1 tablet by mouth twice a day before meals, Disp: 60 tablet, Rfl: 2    sucralfate (CARAFATE) 1 GM tablet, take 1 tablet by mouth four times a day, Disp: 120 tablet, Rfl: 2    ondansetron (ZOFRAN) 4 MG tablet, Take 1 tablet by mouth every 8 hours as needed for Nausea, Disp: 60 tablet, Rfl: 1    Cholecalciferol (VITAMIN D3) 1.25 MG (92936 UT) CAPS, take 1 capsule by mouth every week, Disp: 4 capsule, Rfl: 3    Famotidine-Ca Carb-Mag Hydrox (PEPCID COMPLETE) -165 MG CHEW, Take 1 tablet by mouth 2 times daily as needed (abd pain), Disp: 60 tablet, Rfl: 3    amLODIPine (NORVASC) 10 MG tablet, Take 1 tablet by mouth daily, Disp: 90 tablet, Rfl: 1    atorvastatin (LIPITOR) 20 MG tablet, Take 1 tablet by mouth daily, Disp: 90 tablet, Rfl: 1    losartan (COZAAR) 100 MG tablet, Take 1 tablet by mouth daily, Disp: 90 tablet, Rfl: 1    levocetirizine (XYZAL) 5 MG tablet, Take 1 tablet by mouth every morning, Disp: 90 tablet, Rfl: 1    fluticasone (FLONASE) 50 MCG/ACT nasal spray, 1 spray by Nasal route 2 times daily, Disp: 1 Bottle, Rfl: 2    sertraline (ZOLOFT) 100 MG tablet, Take 200 mg by mouth, Disp: , Rfl:     ALLERGIES:   Allergies   Allergen Reactions    Eggs Or Egg-Derived Products [Eggs Or Egg-Derived Products]        FAMILY HISTORY:       Problem Relation Age of Onset    Heart Disease Mother          SOCIAL HISTORY:   Social History     Socioeconomic History    Marital status: Single     Spouse name: Not on file    Number of children: Not on file    Years of education: Not on file    Highest education level: Not on file   Occupational History    Not on file   Social Needs    Financial resource strain: Not on file    Food insecurity     Worry: Not on file     Inability: Not on file    Transportation needs     Medical: Not on file     Non-medical: Not on file   Tobacco Use    Smoking status: Never Smoker    Smokeless tobacco: Never Used    Tobacco comment: Occasional cigar with relatives ( major holidays)   Substance and Sexual Activity    Alcohol use:  Yes     Alcohol/week: 0.0 standard drinks     Comment: socially    Drug use: No    Sexual activity: Not on file   Lifestyle    Physical activity     Days per week: Not on file     Minutes per session: Not on file    Stress: Not on file   Relationships    Social connections     Talks on phone: Not on file     Gets together: Not on file     Attends Nondenominational service: Not on file     Active member of club or organization: Not on file     Attends meetings of clubs or organizations: Not on file     Relationship status: Not on file    Intimate partner violence     Fear of current or ex partner: Not on file     Emotionally abused: Not on file     Physically abused: Not on file     Forced sexual activity: Not on file   Other Topics Concern    Not on file   Social History Narrative    Not on file       REVIEW OF SYSTEMS: A 12-point review of systemswas obtained and pertinent positives and negatives were enumerated above in the history of present illness. All other reviewed systems / symptoms were negative. Review of Systems   Constitutional: Negative. Negative for appetite change, fatigue and unexpected weight change. HENT: Positive for trouble swallowing. Negative for dental problem, postnasal drip, sinus pressure, sore throat and voice change. Eyes: Positive for visual disturbance. Respiratory: Positive for cough. Negative for choking and wheezing. Cardiovascular: Negative for chest pain, palpitations and leg swelling. Gastrointestinal: Positive for abdominal distention and abdominal pain. Negative for anal bleeding, blood in stool, constipation, diarrhea, nausea, rectal pain and vomiting. Endocrine: Negative. Genitourinary: Negative. Negative for difficulty urinating. Musculoskeletal: Negative. Negative for arthralgias, back pain, gait problem and myalgias. Allergic/Immunologic: Negative. Negative for environmental allergies and food allergies. Neurological: Negative. Negative for dizziness, weakness, light-headedness, numbness and headaches. Hematological: Negative. Does not bruise/bleed easily. Psychiatric/Behavioral: Negative for sleep disturbance. The patient is nervous/anxious.             LABORATORY DATA: Reviewed  Lab Results   Component Value Date    WBC 6.0 07/24/2020    HGB 13.8 07/24/2020    HCT 39.9 (L) 07/24/2020    MCV 82.0 07/24/2020     07/24/2020     07/24/2020    K 4.3 07/24/2020     07/24/2020    CO2 26 07/24/2020    BUN 20 07/24/2020    CREATININE 1.83 (H) 07/24/2020    LABALBU 4.6 07/24/2020    BILITOT 0.78 07/24/2020    ALKPHOS 68 07/24/2020    AST 24 07/24/2020    ALT 22 07/24/2020         Lab Results   Component Value Date

## 2021-05-19 ENCOUNTER — OFFICE VISIT (OUTPATIENT)
Dept: GASTROENTEROLOGY | Age: 26
End: 2021-05-19
Payer: COMMERCIAL

## 2021-05-19 VITALS — WEIGHT: 207 LBS | BODY MASS INDEX: 28.87 KG/M2

## 2021-05-19 DIAGNOSIS — K21.9 GASTROESOPHAGEAL REFLUX DISEASE, UNSPECIFIED WHETHER ESOPHAGITIS PRESENT: Primary | ICD-10-CM

## 2021-05-19 PROCEDURE — 99213 OFFICE O/P EST LOW 20 MIN: CPT | Performed by: INTERNAL MEDICINE

## 2021-05-19 ASSESSMENT — ENCOUNTER SYMPTOMS
BLOOD IN STOOL: 0
WHEEZING: 0
VOMITING: 0
DIARRHEA: 0
BACK PAIN: 0
ABDOMINAL DISTENTION: 1
CONSTIPATION: 0
COUGH: 1
SINUS PRESSURE: 0
SORE THROAT: 0
ABDOMINAL PAIN: 1
RECTAL PAIN: 0
NAUSEA: 0
ANAL BLEEDING: 0
CHOKING: 0
TROUBLE SWALLOWING: 1
VOICE CHANGE: 0
ALLERGIC/IMMUNOLOGIC NEGATIVE: 1

## 2021-05-19 NOTE — PROGRESS NOTES
GI CLINIC FOLLOW UP    INTERVAL HISTORY:   No referring provider defined for this encounter. Chief Complaint   Patient presents with    Gastroesophageal Reflux     He says he is taking Protonix BID and no Pepcid. He is still on carafate   he tried  hycosamine             HISTORY OF PRESENT ILLNESS: Eugenie Holt is a 22 y.o. male with GERD. He is on Protonix twice a day and Carafate 4 times a day. He continues to use Tums very frequently. He reports burning sensation in the throat every day. No blood in stool or melena. Past Medical,Family, and Social History reviewed and does not contribute to the patient presentingcondition. Patient's PMH/PSH,SH,PSYCH Hx, MEDs, ALLERGIES, and ROS were all reviewed and updated in the appropriate sections.     PAST MEDICAL HISTORY:  Past Medical History:   Diagnosis Date    Anxiety     History of chicken pox 2002    Hyperlipidemia     Hypertension     OCD (obsessive compulsive disorder)        Past Surgical History:   Procedure Laterality Date    UPPER GASTROINTESTINAL ENDOSCOPY N/A 9/2/2020    EGD BIOPSY performed by Len Helms MD at [address]:    Current Outpatient Medications:     pantoprazole (PROTONIX) 40 MG tablet, take 1 tablet by mouth twice a day before meals, Disp: 60 tablet, Rfl: 5    losartan (COZAAR) 100 MG tablet, Take 1 tablet by mouth daily, Disp: 90 tablet, Rfl: 1    levocetirizine (XYZAL) 5 MG tablet, Take 1 tablet by mouth every morning, Disp: 90 tablet, Rfl: 1    Cholecalciferol (VITAMIN D3) 1.25 MG (94474 UT) CAPS, take 1 capsule by mouth every week, Disp: 12 capsule, Rfl: 1    atorvastatin (LIPITOR) 20 MG tablet, Take 1 tablet by mouth daily, Disp: 90 tablet, Rfl: 1    amLODIPine (NORVASC) 10 MG tablet, Take 1 tablet by mouth daily, Disp: 90 tablet, Rfl: 1    sucralfate (CARAFATE) 1 GM tablet, take 1 tablet by mouth four times a day, Disp: 120 tablet, Rfl: 5    Hyoscyamine Sulfate SL (LEVSIN/SL) 0.125 MG SUBL, Place 1 tablet under the tongue 3 times daily as needed (abd pain) (Patient not taking: Reported on 3/25/2021), Disp: 120 each, Rfl: 2    ondansetron (ZOFRAN) 4 MG tablet, Take 1 tablet by mouth every 8 hours as needed for Nausea, Disp: 60 tablet, Rfl: 1    Famotidine-Ca Carb-Mag Hydrox (PEPCID COMPLETE) -165 MG CHEW, Take 1 tablet by mouth 2 times daily as needed (abd pain) (Patient not taking: Reported on 3/25/2021), Disp: 60 tablet, Rfl: 3    fluticasone (FLONASE) 50 MCG/ACT nasal spray, 1 spray by Nasal route 2 times daily, Disp: 1 Bottle, Rfl: 2    sertraline (ZOLOFT) 100 MG tablet, Take 200 mg by mouth, Disp: , Rfl:     ALLERGIES:   Allergies   Allergen Reactions    Eggs Or Egg-Derived Products [Eggs Or Egg-Derived Products]        FAMILY HISTORY:       Problem Relation Age of Onset    Heart Disease Mother          SOCIAL HISTORY:   Social History     Socioeconomic History    Marital status: Single     Spouse name: Not on file    Number of children: Not on file    Years of education: Not on file    Highest education level: Not on file   Occupational History    Not on file   Tobacco Use    Smoking status: Never Smoker    Smokeless tobacco: Never Used    Tobacco comment: Occasional cigar with relatives ( major holidays)   Vaping Use    Vaping Use: Never used   Substance and Sexual Activity    Alcohol use: Yes     Alcohol/week: 0.0 standard drinks     Comment: socially    Drug use: No    Sexual activity: Not on file   Other Topics Concern    Not on file   Social History Narrative    Not on file     Social Determinants of Health     Financial Resource Strain:     Difficulty of Paying Living Expenses:    Food Insecurity:     Worried About Running Out of Food in the Last Year:     [address] in the Last Year:    Transportation Needs:     Lack of Transportation (Medical):      Lack of Transportation (Non-Medical):    Physical Activity:     Days of Exercise per Week:     Minutes of Exercise per Session:    Stress:     Feeling of Stress :    Social Connections:     Frequency of Communication with Friends and Family:     Frequency of Social Gatherings with Friends and Family:     Attends Zoroastrianism Services:     Active Member of Clubs or Organizations:     Attends Club or Organization Meetings:     Marital Status:    Intimate Partner Violence:     Fear of Current or Ex-Partner:     Emotionally Abused:     Physically Abused:     Sexually Abused:        REVIEW OF SYSTEMS: A 12-point review of systemswas obtained and pertinent positives and negatives were enumerated above in the history of present illness. All other reviewed systems / symptoms were negative. Review of Systems   Constitutional: Negative. Negative for appetite change, fatigue and unexpected weight change. HENT: Positive for trouble swallowing. Negative for dental problem, postnasal drip, sinus pressure, sore throat and voice change. Eyes: Positive for visual disturbance. Respiratory: Positive for cough. Negative for choking and wheezing. Cardiovascular: Negative for chest pain, palpitations and leg swelling. Gastrointestinal: Positive for abdominal distention and abdominal pain. Negative for anal bleeding, blood in stool, constipation, diarrhea, nausea, rectal pain and vomiting. Endocrine: Negative. Genitourinary: Negative. Negative for difficulty urinating. Musculoskeletal: Negative. Negative for arthralgias, back pain, gait problem and myalgias. Allergic/Immunologic: Negative. Negative for environmental allergies and food allergies. Neurological: Negative. Negative for dizziness, weakness, light-headedness, numbness and headaches. Hematological: Negative. Does not bruise/bleed easily. Psychiatric/Behavioral: Negative for sleep disturbance. The patient is nervous/anxious.             LABORATORY DATA: Reviewed  Lab Results   Component Value Date    WBC Светлана. For any further questions please do not hesitate to contact me. I have reviewed and agree with the ROS entered by the MA/LPN. Note is dictated utilizing voice recognition software. Unfortunately this leads to occasional typographical errors. Please contact our office if you have any questions.     Aby Aguero MD  [facility]  O: #[phone]

## 2021-05-20 ENCOUNTER — TELEPHONE (OUTPATIENT)
Dept: GASTROENTEROLOGY | Age: 26
End: 2021-05-20

## 2021-06-02 ENCOUNTER — HOSPITAL ENCOUNTER (OUTPATIENT)
Dept: PREADMISSION TESTING | Age: 26
Discharge: HOME | End: 2021-06-06
Payer: COMMERCIAL

## 2021-06-02 VITALS — BODY MASS INDEX: 28 KG/M2 | HEIGHT: 71 IN | WEIGHT: 200 LBS

## 2021-06-02 RX ORDER — M-VIT,TX,IRON,MINS/CALC/FOLIC 27MG-0.4MG
1 TABLET ORAL DAILY
COMMUNITY

## 2021-06-02 NOTE — PROGRESS NOTES
Pre-op Instructions For Out-Patient Endoscopy Surgery    Medication Instructions:  · Please stop herbs and any supplements now (includes vitamins and minerals). · Please contact your surgeon and prescribing physician for pre-op instructions for any blood thinners. · If you have inhalers/aerosol treatments at home, please use them the morning of your surgery and bring the inhalers with you to the hospital.    · Please take the following medications the morning of your surgery with a sip of water:    Losartan, amlodipine    Surgery Instructions:  1. After midnight before surgery:  Do not eat or drink anything, including water, mints, gum, and hard candy. You may brush your teeth without swallowing. No smoking, chewing tobacco, or street drugs. 2. Please shower or bathe before surgery. 3. Please do not wear any cologne, lotion, powder, jewelry, piercings, perfume, makeup, nail polish, hair accessories, or hair spray on the day of surgery. Wear loose comfortable clothing. 4. Leave your valuables at home. Bring a storage case for any glasses/contacts. 5. An adult who is responsible for you MUST drive you home and should be with you for the first 24 hours after surgery. The Day of Surgery:  · Arrive at [address] [place] [address] Surgery Entrance at the time directed by your surgeon and check in at the desk. · If you have a living will or healthcare power of , please bring a copy. · You will be taken to the pre-op holding area where you will be prepared for surgery. A physical assessment will be performed by a nurse practitioner or house officer. Your IV will be started and you will meet your anesthesiologist.    · We are currently limiting visitors to only one designated person in the pre-op holding area. When you go to surgery, your family will be directed to the surgical waiting room, where the doctor should speak with them after your surgery.     · After surgery, you will

## 2021-06-11 ENCOUNTER — ANESTHESIA EVENT (OUTPATIENT)
Dept: ENDOSCOPY | Age: 26
End: 2021-06-11
Payer: COMMERCIAL

## 2021-06-14 ENCOUNTER — HOSPITAL ENCOUNTER (OUTPATIENT)
Age: 26
Setting detail: OUTPATIENT SURGERY
Discharge: HOME | End: 2021-06-14
Attending: INTERNAL MEDICINE | Admitting: INTERNAL MEDICINE
Payer: COMMERCIAL

## 2021-06-14 ENCOUNTER — ANESTHESIA (OUTPATIENT)
Dept: ENDOSCOPY | Age: 26
End: 2021-06-14
Payer: COMMERCIAL

## 2021-06-14 VITALS
DIASTOLIC BLOOD PRESSURE: 47 MMHG | HEART RATE: 66 BPM | OXYGEN SATURATION: 97 % | SYSTOLIC BLOOD PRESSURE: 112 MMHG | TEMPERATURE: 98 F | BODY MASS INDEX: 28 KG/M2 | WEIGHT: 200 LBS | HEIGHT: 71 IN | RESPIRATION RATE: 11 BRPM

## 2021-06-14 VITALS — DIASTOLIC BLOOD PRESSURE: 57 MMHG | SYSTOLIC BLOOD PRESSURE: 115 MMHG | OXYGEN SATURATION: 98 %

## 2021-06-14 PROCEDURE — 6360000002 HC RX W HCPCS: Performed by: NURSE ANESTHETIST, CERTIFIED REGISTERED

## 2021-06-14 PROCEDURE — 2709999900 HC NON-CHARGEABLE SUPPLY: Performed by: INTERNAL MEDICINE

## 2021-06-14 PROCEDURE — 3700000001 HC ADD 15 MINUTES (ANESTHESIA): Performed by: INTERNAL MEDICINE

## 2021-06-14 PROCEDURE — 2580000003 HC RX 258: Performed by: ANESTHESIOLOGY

## 2021-06-14 PROCEDURE — 2500000003 HC RX 250 WO HCPCS: Performed by: NURSE ANESTHETIST, CERTIFIED REGISTERED

## 2021-06-14 PROCEDURE — 7100000001 HC PACU RECOVERY - ADDTL 15 MIN: Performed by: INTERNAL MEDICINE

## 2021-06-14 PROCEDURE — 3700000000 HC ANESTHESIA ATTENDED CARE: Performed by: INTERNAL MEDICINE

## 2021-06-14 PROCEDURE — 2720000010 HC SURG SUPPLY STERILE: Performed by: INTERNAL MEDICINE

## 2021-06-14 PROCEDURE — 3609019000 HC EGD CAPSULE ENDOSCOPY: Performed by: INTERNAL MEDICINE

## 2021-06-14 PROCEDURE — 91035 G-ESOPH REFLX TST W/ELECTROD: CPT | Performed by: INTERNAL MEDICINE

## 2021-06-14 PROCEDURE — 7100000000 HC PACU RECOVERY - FIRST 15 MIN: Performed by: INTERNAL MEDICINE

## 2021-06-14 PROCEDURE — 43235 EGD DIAGNOSTIC BRUSH WASH: CPT | Performed by: INTERNAL MEDICINE

## 2021-06-14 RX ORDER — SODIUM CHLORIDE 9 MG/ML
25 INJECTION, SOLUTION INTRAVENOUS PRN
Status: DISCONTINUED | OUTPATIENT
Start: 2021-06-14 | End: 2021-06-14 | Stop reason: HOSPADM

## 2021-06-14 RX ORDER — SODIUM CHLORIDE 0.9 % (FLUSH) 0.9 %
10 SYRINGE (ML) INJECTION PRN
Status: DISCONTINUED | OUTPATIENT
Start: 2021-06-14 | End: 2021-06-14 | Stop reason: HOSPADM

## 2021-06-14 RX ORDER — LIDOCAINE HYDROCHLORIDE 10 MG/ML
INJECTION, SOLUTION EPIDURAL; INFILTRATION; INTRACAUDAL; PERINEURAL PRN
Status: DISCONTINUED | OUTPATIENT
Start: 2021-06-14 | End: 2021-06-14 | Stop reason: SDUPTHER

## 2021-06-14 RX ORDER — SODIUM CHLORIDE, SODIUM LACTATE, POTASSIUM CHLORIDE, CALCIUM CHLORIDE 600; 310; 30; 20 MG/100ML; MG/100ML; MG/100ML; MG/100ML
INJECTION, SOLUTION INTRAVENOUS CONTINUOUS
Status: DISCONTINUED | OUTPATIENT
Start: 2021-06-14 | End: 2021-06-14 | Stop reason: HOSPADM

## 2021-06-14 RX ORDER — LIDOCAINE HYDROCHLORIDE 10 MG/ML
1 INJECTION, SOLUTION EPIDURAL; INFILTRATION; INTRACAUDAL; PERINEURAL
Status: DISCONTINUED | OUTPATIENT
Start: 2021-06-14 | End: 2021-06-14 | Stop reason: HOSPADM

## 2021-06-14 RX ORDER — PROPOFOL 10 MG/ML
INJECTION, EMULSION INTRAVENOUS PRN
Status: DISCONTINUED | OUTPATIENT
Start: 2021-06-14 | End: 2021-06-14 | Stop reason: SDUPTHER

## 2021-06-14 RX ADMIN — PROPOFOL 100 MG: 10 INJECTION, EMULSION INTRAVENOUS at 09:04

## 2021-06-14 RX ADMIN — LIDOCAINE HYDROCHLORIDE 50 MG: 10 INJECTION, SOLUTION EPIDURAL; INFILTRATION; INTRACAUDAL; PERINEURAL at 09:04

## 2021-06-14 RX ADMIN — PROPOFOL 100 MG: 10 INJECTION, EMULSION INTRAVENOUS at 09:06

## 2021-06-14 RX ADMIN — PROPOFOL 100 MG: 10 INJECTION, EMULSION INTRAVENOUS at 09:09

## 2021-06-14 RX ADMIN — SODIUM CHLORIDE, POTASSIUM CHLORIDE, SODIUM LACTATE AND CALCIUM CHLORIDE: 600; 310; 30; 20 INJECTION, SOLUTION INTRAVENOUS at 09:17

## 2021-06-14 RX ADMIN — PROPOFOL 100 MG: 10 INJECTION, EMULSION INTRAVENOUS at 09:12

## 2021-06-14 ASSESSMENT — PULMONARY FUNCTION TESTS
PIF_VALUE: 1

## 2021-06-14 ASSESSMENT — PAIN SCALES - GENERAL: PAINLEVEL_OUTOF10: 0

## 2021-06-14 ASSESSMENT — PAIN - FUNCTIONAL ASSESSMENT: PAIN_FUNCTIONAL_ASSESSMENT: 0-10

## 2021-06-14 NOTE — H&P
Interval History and Physical    Pt Name: Lisa Carnes  MRN: [id number]  YOB: 1995  Date of evaluation: 6/14/2021      [x] I have reviewed the Office Visit note found in [facility] by Dr. Bertie Denver dated 05/19/2021 used for an Interval History and Physical note. Please see Dr. Frank Mt office visit note below. [x] I have examined  Lisa Carnes a 32 y.o., male who is scheduled for a esophageal capsule endoscopy/Walsh by Dr. Juanita Mendez due to GERD. The patient denies health changes since appointment with Dr. Juanita Mendez on 05/19/2021. Pt denies current abdominal pain, dysphagia, N/V/D or constipation, fever, chills, productive cough, SOB, chest pain/pressure, palpitations. NPO. Took amlodipine and losartan this am with sip of water. Stopped multivitamins one week ago. Vital signs: Review vitals per RN flowsheet. Allergies:  Patient has no known allergies. Past medical history, surgical history, social history, and family history were reviewed and updated in EPIC as indicated. Medications:    Prior to Admission medications    Medication Sig Start Date End Date Taking?  Authorizing Provider   Cholecalciferol (VITAMIN D3) 1.25 MG (77290 UT) CAPS take 1 capsule by mouth every week 6/3/21   Leigh Darnell MD   Multiple Vitamins-Minerals (THERAPEUTIC MULTIVITAMIN-MINERALS) tablet Take 1 tablet by mouth daily    Historical Provider, MD   pantoprazole (PROTONIX) 40 MG tablet take 1 tablet by mouth twice a day before meals 3/25/21   Bertie Denver, MD   losartan (COZAAR) 100 MG tablet Take 1 tablet by mouth daily 3/25/21 6/23/21  Leigh Darnell MD   levocetirizine (XYZAL) 5 MG tablet Take 1 tablet by mouth every morning 3/25/21   Leigh Darnell MD   atorvastatin (LIPITOR) 20 MG tablet Take 1 tablet by mouth daily 3/25/21 6/23/21  Leigh Darnell MD   amLODIPine (NORVASC) 10 MG tablet Take 1 tablet by mouth daily 3/25/21 6/23/21  Leigh Darnell MD   sucralfate (CARAFATE) 1 GM tablet take 1 tablet by mouth four times a day 2/22/21   Hi Alatorre MD   ondansetron (ZOFRAN) 4 MG tablet Take 1 tablet by mouth every 8 hours as needed for Nausea 11/25/20   Hi Alatorre MD   sertraline (ZOLOFT) 100 MG tablet Take 200 mg by mouth 9/7/17   Historical Provider, MD       This is a 32 y.o. male who is pleasant, cooperative, alert and oriented x 3, in no acute distress. Heart: Regular rate and rhythm without murmur, gallop, or rub. Lungs: Normal respiratory effort, unlabored, and clear to auscultation without wheezes or rales bilaterally. Abdomen: Soft, nontender, nondistended with active bowel sounds. OLIVE Williamson CNP   Electronically signed 6/14/2021 at 6:53 AM          Office Visit  5/19/2021  [facility]   Hi Alatorre MD  Gastroenterology  Gastroesophageal reflux disease, unspecified whether esophagitis present  Dx  Gastroesophageal Reflux   Reason for Visit   Progress Notes  Hi Alatorre MD (Physician) [facility]  Expand AllCollapse All           GI CLINIC FOLLOW UP     INTERVAL HISTORY:   No referring provider defined for this encounter. Chief Complaint   Patient presents with    Gastroesophageal Reflux       He says he is taking Protonix BID and no Pepcid. He is still on carafate   he tried  hycosamine                 HISTORY OF PRESENT ILLNESS: Sandra Thomas is a 22 y.o. male with GERD. He is on Protonix twice a day and Carafate 4 times a day. He continues to use Tums very frequently. He reports burning sensation in the throat every day. No blood in stool or melena. Past Medical,Family, and Social History reviewed and does not contribute to the patient presentingcondition. Patient's PMH/PSH,SH,PSYCH Hx, MEDs, ALLERGIES, and ROS were all reviewed and updated in the appropriate sections.      PAST MEDICAL HISTORY:  Past Medical History[]Expand by Default        Past Medical History:  Eggs Or Egg-Derived Products [Eggs Or Egg-Derived Products]           FAMILY HISTORY:   Family History[]Expand by Default       Problem Relation Age of Onset    Heart Disease Mother                 SOCIAL HISTORY:   Social History   []Expand by Default            Socioeconomic History    Marital status: Single       Spouse name: Not on file    Number of children: Not on file    Years of education: Not on file    Highest education level: Not on file   Occupational History    Not on file   Tobacco Use    Smoking status: Never Smoker    Smokeless tobacco: Never Used    Tobacco comment: Occasional cigar with relatives ( major holidays)   Vaping Use    Vaping Use: Never used   Substance and Sexual Activity    Alcohol use: Yes       Alcohol/week: 0.0 standard drinks       Comment: socially    Drug use: No    Sexual activity: Not on file   Other Topics Concern    Not on file   Social History Narrative    Not on file      Social Determinants of Health          Financial Resource Strain:     Difficulty of Paying Living Expenses:    Food Insecurity:     Worried About Running Out of Food in the Last Year:     [address] in the Last Year:    Transportation Needs:     Lack of Transportation (Medical):      Lack of Transportation (Non-Medical):    Physical Activity:     Days of Exercise per Week:     Minutes of Exercise per Session:    Stress:     Feeling of Stress :    Social Connections:     Frequency of Communication with Friends and Family:     Frequency of Social Gatherings with Friends and Family:     Attends Tenriism Services:     Active Member of Clubs or Organizations:     Attends Club or Organization Meetings:     Marital Status:    Intimate Partner Violence:     Fear of Current or Ex-Partner:     Emotionally Abused:     Physically Abused:     Sexually Abused:             REVIEW OF SYSTEMS: A 12-point review of systemswas obtained and pertinent positives and negatives were 1 03/10/2018     LYMPHSABS 2.00 03/10/2018     MONOSABS 0.60 03/10/2018     NEUTROABS 3.40 03/10/2018     EOSABS 0.10 03/10/2018     BASOSABS 0.00 03/10/2018            DIAGNOSTIC TESTING:      No results found. PHYSICAL EXAMINATION: Vital signs reviewed per the nursing documentation. There were no vitals taken for this visit. There is no height or weight on file to calculate BMI. Physical Exam     I personally reviewed the nurse's notes and documentation and I agree with her notes. General: alert, appears stated age and cooperative Psych: Normal. and Alert and oriented, appropriate affect. . Normal affect. Mentation normal  HEENT: PERRLA. Clear conjunctivae and sclerae. Moist oral mucosae, no lesions or ulcers. The neck is supple, without lymphadenopathy or jugular venous distension. No masses. Normal thyroid. Cardiovascular: S1 S2 RRR no rubs or murmurs. Pulmonary: clear BL. No accessory muscle usage. Abdominal Exam: Soft, NT ND, no hepato or spleno megaly, +BS, no ascites. IMPRESSION: Mr. Joby Lyn is a 22 y.o. male with GERD. He continues report symptoms every day. He is on Protonix twice a day and Carafate 4 times a day. Takes Tums very frequently. I suspect there is significant component of anxiety. We will plan for Bravo test.  Follow-up after that. Thank you for allowing me to participate in the care of Mr. Joby Lyn. For any further questions please do not hesitate to contact me. I have reviewed and agree with the ROS entered by the MA/LPN. Note is dictated utilizing voice recognition software. Unfortunately this leads to occasional typographical errors. Please contact our office if you have any questions.      Jayla Lyman MD  [facility]  O: #[phone]

## 2021-06-14 NOTE — ANESTHESIA POSTPROCEDURE EVALUATION
POST- ANESTHESIA EVALUATION       Pt Name: Linda Ling  MRN: [id number]  YOB: 1995  Date of evaluation: 6/14/2021  Time:  11:12 AM      BP (!) 112/47   Pulse 66   Temp 98 °F (36.7 °C)   Resp 11   Ht 5' 11\" (1.803 m)   Wt 200 lb (90.7 kg)   SpO2 97%   BMI 27.89 kg/m²      Consciousness Level  Awake  Cardiopulmonary Status  Stable  Pain Adequately Treated YES  Nausea / Vomiting  NO  Adequate Hydration  YES  Anesthesia Related Complications NONE      Electronically signed by Huseyin Hearn MD on 6/14/2021 at 11:12 AM       Department of Anesthesiology  Postprocedure Note    Patient: Linda Ling  MRN: [id number]  [place]: 1995  Date of evaluation: 6/14/2021  Time:  11:12 AM     Procedure Summary     Date: 06/14/21 Room / Location: [address]    Anesthesia Start: 0859 Anesthesia Stop: [phone]    Procedure: ESOPHAGEAL CAPSULE ENDOSCOPY / Lucia Ship (N/A Esophagus) Diagnosis:       (GERD)      (PT FULLY VACCINATED)    Surgeons: Talon Cronin MD Responsible Provider: Adwoa Guajardo MD    Anesthesia Type: MAC ASA Status: 2          Anesthesia Type: MAC    Ashia Phase I: Ashia Score: 10    Ashia Phase II:      Last vitals: Reviewed and per EMR flowsheets.        Anesthesia Post Evaluation

## 2021-06-14 NOTE — OP NOTE
[facility] END[facility]     PROCEDURE DATE: 06/14/21    REFERRING PHYSICIAN: Ruperto Stewart, *     PRIMARY CARE PROVIDER: Danielle Sheriff MD    ATTENDING PHYSICIAN: Len Helms MD     HISTORY: Mr. Sravanthi Carrion is a 32 y.o. male who presents to the [place] [address] Endoscopy unit for upper endoscopy. The patient's clinical history is remarkable for GERD. He continues to have symptoms despite being on high-dose antiacid medications. He is currently medically stable and appropriate for the planned procedure. PREOPERATIVE DIAGNOSIS: GERD despite of high-dose medications. PROCEDURES:   1) Transoral Upper Endoscopy, placement of Bravo capsule. POSTOPERATIVE DIAGNOSIS:   Small sliding hiatal hernia  Distal esophageal changes suggestive of GERD  Successful placement of Walsh capsule    SPECIMENS: None    MEDICATIONS:   MAC per anesthesia    EBL: minimal    INSTRUMENT: Olympus GIF-H190 flexible Gastroscope. PREPARATION: The nature and character of the procedure as well as risks, benefits, and alternatives were discussed with the patient and informed consent was obtained. Complications were said to include, but were not limited to: medication allergy, medication reaction, cardiovascular and respiratory problems, bleeding, perforation, infection, and/or missed diagnosis. Following arrival in the endoscopy room, the patient was placed in the left lateral decubitus position and final time-out accomplished in the presence of the nursing staff. Baseline vital signs were obtained and reviewed, and IV sedation was subsequently initiated. FINDINGS:   Esophagus: The esophagus was inspected to the Z-line. The endoscopic exam showed slightly had a hernia of around 3 cm. Irregular Z-line suggestive of GERD. GE junction at 37 cm. Bravo capsule was placed around 5 cm above GE junction. This was confirmed by endoscopy. Stomach:  The stomach was inspected in both forward and retroflex fashion and was appropriately distensible. The cardia, fundus, incisura, antrum and pylorus were identified via direct visualization. The endoscopic exam showed no pathology. Duodenum: The proximal small bowel was inspected through the bulb, sweep, and second portion of the duodenum. The endoscopic exam showed no pathology. RECOMMENDATIONS:   1) Follow up with referring provider, as previously scheduled. 2) Continue current medication. 3) Follow-up with me in the office in 3 to 4 weeks.       José Root

## 2021-06-14 NOTE — ANESTHESIA PRE PROCEDURE
Department of Anesthesiology  Preprocedure Note       Name:  Laura Farfan   Age:  32 y.o.  :  1995                                          MRN:  [id number]         Date:  2021      Surgeon: Marilou Trujillo):  Oumou Kramer MD    Procedure: Procedure(s):  ESOPHAGEAL CAPSULE ENDOSCOPY / BRAVO    Medications prior to admission:   Prior to Admission medications    Medication Sig Start Date End Date Taking? Authorizing Provider   Cholecalciferol (VITAMIN D3) 1.25 MG (40853 UT) CAPS take 1 capsule by mouth every week 6/3/21   Libby Harada, MD   Multiple Vitamins-Minerals (THERAPEUTIC MULTIVITAMIN-MINERALS) tablet Take 1 tablet by mouth daily    Historical Provider, MD   pantoprazole (PROTONIX) 40 MG tablet take 1 tablet by mouth twice a day before meals 3/25/21   Oumou Kramer MD   losartan (COZAAR) 100 MG tablet Take 1 tablet by mouth daily 3/25/21 6/23/21  Libby Harada, MD   levocetirizine (XYZAL) 5 MG tablet Take 1 tablet by mouth every morning 3/25/21   Libby Harada, MD   atorvastatin (LIPITOR) 20 MG tablet Take 1 tablet by mouth daily 3/25/21 6/23/21  Libby Harada, MD   amLODIPine (NORVASC) 10 MG tablet Take 1 tablet by mouth daily 3/25/21 6/23/21  Libby Harada, MD   sucralfate (CARAFATE) 1 GM tablet take 1 tablet by mouth four times a day 21   Oumou Kramer MD   ondansetron (ZOFRAN) 4 MG tablet Take 1 tablet by mouth every 8 hours as needed for Nausea 20   Oumou Kramer MD   sertraline (ZOLOFT) 100 MG tablet Take 200 mg by mouth 17   Historical Provider, MD       Current medications:    No current facility-administered medications for this visit. No current outpatient medications on file.      Facility-Administered Medications Ordered in Other Visits   Medication Dose Route Frequency Provider Last Rate Last Admin    lactated ringers infusion   Intravenous Continuous Slemp MD Clarence        sodium chloride flush 0.9 % injection 10 mL  10 mL Intravenous PRN Katie Lima MD        0.9 % sodium chloride infusion  25 mL Intravenous PRN Katie Lima MD        lidocaine PF 1 % injection 1 mL  1 mL Intradermal Once PRN Bert Hernandez MD           Allergies:    No Known Allergies    Problem List:    Patient Active Problem List   Diagnosis Code    Diarrhea R19.7    Essential hypertension I10    Other hyperlipidemia E78.49       Past Medical History:        Diagnosis Date    Anxiety     History of chicken pox 2002    Hyperlipidemia     Hypertension     OCD (obsessive compulsive disorder)        Past Surgical History:        Procedure Laterality Date    UPPER GASTROINTESTINAL ENDOSCOPY N/A 9/2/2020    EGD BIOPSY performed by Oumuo Kramer MD at [facility]       Social History:    Social History     Tobacco Use    Smoking status: Never Smoker    Smokeless tobacco: Never Used    Tobacco comment: Occasional cigar with relatives ( major holidays)   Substance Use Topics    Alcohol use: Yes     Alcohol/week: 0.0 standard drinks     Comment: socially                                Counseling given: Not Answered  Comment: Occasional cigar with relatives ( major holidays)      Vital Signs (Current): There were no vitals filed for this visit.                                            BP Readings from Last 3 Encounters:   03/25/21 126/82   09/02/20 99/65   09/02/20 111/67       NPO Status:                                                                                 BMI:   Wt Readings from Last 3 Encounters:   06/02/21 200 lb (90.7 kg)   05/19/21 207 lb (93.9 kg)   01/12/21 206 lb (93.4 kg)     There is no height or weight on file to calculate BMI.    CBC:   Lab Results   Component Value Date    WBC 6.0 07/24/2020    RBC 4.87 07/24/2020    HGB 13.8 07/24/2020    HCT 39.9 07/24/2020    MCV 82.0 07/24/2020    RDW 11.8 07/24/2020     07/24/2020       CMP:   Lab Results   Component Value Date     07/24/2020    K 4.3 07/24/2020     07/24/2020    CO2 26 07/24/2020    BUN 20 07/24/2020    CREATININE 1.83 07/24/2020    GFRAA 55 07/24/2020    LABGLOM 45 07/24/2020    GLUCOSE 89 07/24/2020    PROT 8.3 07/24/2020    CALCIUM 10.7 07/24/2020    BILITOT 0.78 07/24/2020    ALKPHOS 68 07/24/2020    AST 24 07/24/2020    ALT 22 07/24/2020       POC Tests: No results for input(s): POCGLU, POCNA, POCK, POCCL, POCBUN, POCHEMO, POCHCT in the last 72 hours. Coags: No results found for: PROTIME, INR, APTT    HCG (If Applicable): No results found for: PREGTESTUR, PREGSERUM, HCG, HCGQUANT     ABGs: No results found for: PHART, PO2ART, PNS6WPU, XRZ6ERM, BEART, S1IWQUYK     Type & Screen (If Applicable):  No results found for: LABABO, LABRH    Drug/Infectious Status (If Applicable):  No results found for: HIV, HEPCAB    COVID-19 Screening (If Applicable):   Lab Results   Component Value Date    COVID19 Not Detected 08/29/2020         Anesthesia Evaluation  Patient summary reviewed and Nursing notes reviewed no history of anesthetic complications:   Airway: Mallampati: II  TM distance: >3 FB   Neck ROM: full  Mouth opening: > = 3 FB Dental: normal exam         Pulmonary:Negative Pulmonary ROS and normal exam  breath sounds clear to auscultation                             Cardiovascular:    (+) hypertension:, hyperlipidemia        Rhythm: regular  Rate: normal                    Neuro/Psych:   (+) psychiatric history: stable with treatmentdepression/anxiety              ROS comment:   GI/Hepatic/Renal:   (+) GERD:,           Endo/Other: Negative Endo/Other ROS                    Abdominal:           Vascular: negative vascular ROS. Anesthesia Plan      MAC     ASA 2       Induction: intravenous. Anesthetic plan and risks discussed with patient. Plan discussed with CRNA.                   Kvng Luna MD   6/14/2021

## 2021-07-14 ENCOUNTER — OFFICE VISIT (OUTPATIENT)
Dept: GASTROENTEROLOGY | Age: 26
End: 2021-07-14
Payer: COMMERCIAL

## 2021-07-14 VITALS — WEIGHT: 210 LBS | BODY MASS INDEX: 29.29 KG/M2

## 2021-07-14 DIAGNOSIS — R10.13 EPIGASTRIC PAIN: ICD-10-CM

## 2021-07-14 DIAGNOSIS — R11.0 NAUSEA: ICD-10-CM

## 2021-07-14 PROCEDURE — 1036F TOBACCO NON-USER: CPT | Performed by: INTERNAL MEDICINE

## 2021-07-14 PROCEDURE — 99213 OFFICE O/P EST LOW 20 MIN: CPT | Performed by: INTERNAL MEDICINE

## 2021-07-14 PROCEDURE — G8427 DOCREV CUR MEDS BY ELIG CLIN: HCPCS | Performed by: INTERNAL MEDICINE

## 2021-07-14 PROCEDURE — G8417 CALC BMI ABV UP PARAM F/U: HCPCS | Performed by: INTERNAL MEDICINE

## 2021-07-14 RX ORDER — FAMOTIDINE 40 MG/1
40 TABLET, FILM COATED ORAL EVERY EVENING
Qty: 30 TABLET | Refills: 3 | Status: SHIPPED | OUTPATIENT
Start: 2021-07-14 | End: 2021-07-14

## 2021-07-14 RX ORDER — FAMOTIDINE 40 MG/1
40 TABLET, FILM COATED ORAL EVERY EVENING
Qty: 30 TABLET | Refills: 1 | Status: SHIPPED | OUTPATIENT
Start: 2021-07-14 | End: 2021-12-01 | Stop reason: SDUPTHER

## 2021-07-14 RX ORDER — ONDANSETRON 8 MG/1
8 TABLET, ORALLY DISINTEGRATING ORAL EVERY 12 HOURS PRN
Qty: 30 TABLET | Refills: 0 | Status: SHIPPED | OUTPATIENT
Start: 2021-07-14 | End: 2022-05-26 | Stop reason: ALTCHOICE

## 2021-07-14 RX ORDER — AMITRIPTYLINE HYDROCHLORIDE 25 MG/1
TABLET, FILM COATED ORAL
COMMUNITY
Start: 2021-06-25 | End: 2022-05-26 | Stop reason: ALTCHOICE

## 2021-07-14 RX ORDER — ONDANSETRON 4 MG/1
4 TABLET, FILM COATED ORAL EVERY 8 HOURS PRN
Qty: 60 TABLET | Refills: 0 | Status: CANCELLED | OUTPATIENT
Start: 2021-07-14

## 2021-07-14 ASSESSMENT — ENCOUNTER SYMPTOMS
ANAL BLEEDING: 0
NAUSEA: 0
WHEEZING: 0
BACK PAIN: 0
DIARRHEA: 0
VOICE CHANGE: 0
ALLERGIC/IMMUNOLOGIC NEGATIVE: 1
COUGH: 1
ABDOMINAL PAIN: 1
CONSTIPATION: 0
BLOOD IN STOOL: 0
RECTAL PAIN: 0
SINUS PRESSURE: 0
SORE THROAT: 0
ABDOMINAL DISTENTION: 1
TROUBLE SWALLOWING: 1
CHOKING: 0
VOMITING: 0

## 2021-07-14 NOTE — PROGRESS NOTES
GI CLINIC FOLLOW UP    INTERVAL HISTORY:   No referring provider defined for this encounter. Chief Complaint   Patient presents with    Gastroesophageal Reflux     EGD done, Protonix, carafate and still needs tums. HISTORY OF PRESENT ILLNESS: La Varner is a 32 y.o. male with GERD. He continues report symptoms every day. Long hours. Mostly in the morning. Some nausea intermittently. EGD with [facility] showed still some acid reflux. He is on Protonix twice a day. He is also on Carafate. Past Medical,Family, and Social History reviewed and does not contribute to the patient presentingcondition. Patient's PMH/PSH,SH,PSYCH Hx, MEDs, ALLERGIES, and ROS were all reviewed and updated in the appropriate sections.     PAST MEDICAL HISTORY:  Past Medical History:   Diagnosis Date    Anxiety     History of chicken pox 2002    Hyperlipidemia     Hypertension     OCD (obsessive compulsive disorder)        Past Surgical History:   Procedure Laterality Date    CAPSULE ENDOSCOPY N/A 6/14/2021    ESOPHAGEAL CAPSULE ENDOSCOPY / FOURNIER performed by Martha Nguyen MD at [address] [place] [address] N/A 9/2/2020    EGD BIOPSY performed by Martha Nguyen MD at [address]:    Current Outpatient Medications:     amitriptyline (ELAVIL) 25 MG tablet, Take by mouth, Disp: , Rfl:     atorvastatin (LIPITOR) 20 MG tablet, Take 1 tablet by mouth daily, Disp: 90 tablet, Rfl: 1    amLODIPine (NORVASC) 10 MG tablet, Take 1 tablet by mouth daily, Disp: 90 tablet, Rfl: 1    losartan (COZAAR) 100 MG tablet, Take 1 tablet by mouth daily, Disp: 90 tablet, Rfl: 1    levocetirizine (XYZAL) 5 MG tablet, Take 1 tablet by mouth every morning, Disp: 90 tablet, Rfl: 1    Cholecalciferol (VITAMIN D3) 1.25 MG (69831 UT) CAPS, take 1 capsule by mouth every week, Disp: 12 capsule, Rfl: 1    Multiple Vitamins-Minerals (THERAPEUTIC MULTIVITAMIN-MINERALS) tablet, Take 1 tablet by mouth daily, Disp: , Rfl:     pantoprazole (PROTONIX) 40 MG tablet, take 1 tablet by mouth twice a day before meals, Disp: 60 tablet, Rfl: 5    sucralfate (CARAFATE) 1 GM tablet, take 1 tablet by mouth four times a day, Disp: 120 tablet, Rfl: 5    ondansetron (ZOFRAN) 4 MG tablet, Take 1 tablet by mouth every 8 hours as needed for Nausea, Disp: 60 tablet, Rfl: 1    sertraline (ZOLOFT) 100 MG tablet, Take 200 mg by mouth, Disp: , Rfl:     ALLERGIES:   No Known Allergies    FAMILY HISTORY:       Problem Relation Age of Onset    Heart Disease Mother          SOCIAL HISTORY:   Social History     Socioeconomic History    Marital status: Single     Spouse name: Not on file    Number of children: Not on file    Years of education: Not on file    Highest education level: Not on file   Occupational History    Not on file   Tobacco Use    Smoking status: Never Smoker    Smokeless tobacco: Never Used    Tobacco comment: Occasional cigar with relatives ( major holidays)   Vaping Use    Vaping Use: Never used   Substance and Sexual Activity    Alcohol use: Yes     Alcohol/week: 0.0 standard drinks     Comment: socially    Drug use: No    Sexual activity: Not on file   Other Topics Concern    Not on file   Social History Narrative    Not on file     Social Determinants of Health     Financial Resource Strain:     Difficulty of Paying Living Expenses:    Food Insecurity:     Worried About Running Out of Food in the Last Year:     [address] in the Last Year:    Transportation Needs:     Lack of Transportation (Medical):      Lack of Transportation (Non-Medical):    Physical Activity:     Days of Exercise per Week:     Minutes of Exercise per Session:    Stress:     Feeling of Stress :    Social Connections:     Frequency of Communication with Friends and Family:     Frequency of Social Gatherings with Friends and Family:     Attends Church Services:     Active Member of Clubs or Organizations:     Attends Club or Organization Meetings:     Marital Status:    Intimate Partner Violence:     Fear of Current or Ex-Partner:     Emotionally Abused:     Physically Abused:     Sexually Abused:        REVIEW OF SYSTEMS: A 12-point review of systemswas obtained and pertinent positives and negatives were enumerated above in the history of present illness. All other reviewed systems / symptoms were negative. Review of Systems   Constitutional: Negative. Negative for appetite change, fatigue and unexpected weight change. HENT: Positive for trouble swallowing. Negative for dental problem, postnasal drip, sinus pressure, sore throat and voice change. Eyes: Positive for visual disturbance. Respiratory: Positive for cough. Negative for choking and wheezing. Cardiovascular: Negative for chest pain, palpitations and leg swelling. Gastrointestinal: Positive for abdominal distention and abdominal pain. Negative for anal bleeding, blood in stool, constipation, diarrhea, nausea, rectal pain and vomiting. Endocrine: Negative. Genitourinary: Negative. Negative for difficulty urinating. Musculoskeletal: Negative. Negative for arthralgias, back pain, gait problem and myalgias. Allergic/Immunologic: Negative. Negative for environmental allergies and food allergies. Neurological: Negative. Negative for dizziness, weakness, light-headedness, numbness and headaches. Hematological: Negative. Does not bruise/bleed easily. Psychiatric/Behavioral: Negative for sleep disturbance. The patient is nervous/anxious.             LABORATORY DATA: Reviewed  Lab Results   Component Value Date    WBC 6.0 07/24/2020    HGB 13.8 07/24/2020    HCT 39.9 (L) 07/24/2020    MCV 82.0 07/24/2020     07/24/2020     07/24/2020    K 4.3 07/24/2020     07/24/2020    CO2 26 07/24/2020    BUN 20 07/24/2020    CREATININE 1.83 (H) 07/24/2020    LABALBU 4.6 07/24/2020    BILITOT 0.78 07/24/2020 ALKPHOS 68 07/24/2020    AST 24 07/24/2020    ALT 22 07/24/2020         Lab Results   Component Value Date    RBC 4.87 07/24/2020    HGB 13.8 07/24/2020    MCV 82.0 07/24/2020    MCH 28.3 07/24/2020    MCHC 34.5 07/24/2020    RDW 11.8 07/24/2020    MPV 7.0 07/24/2020    BASOPCT 1 03/10/2018    LYMPHSABS 2.00 03/10/2018    MONOSABS 0.60 03/10/2018    NEUTROABS 3.40 03/10/2018    EOSABS 0.10 03/10/2018    BASOSABS 0.00 03/10/2018         DIAGNOSTIC TESTING:     No results found. PHYSICAL EXAMINATION: Vital signs reviewed per the nursing documentation. There were no vitals taken for this visit. There is no height or weight on file to calculate BMI. Physical Exam      I personally reviewed the nurse's notes and documentation and I agree with her notes. General: alert, appears stated age and cooperative Psych: Normal. and Alert and oriented, appropriate affect. . Normal affect. Mentation normal  HEENT: PERRLA. Clear conjunctivae and sclerae. Moist oral mucosae, no lesions or ulcers. The neck is supple, without lymphadenopathy or jugular venous distension. No masses. Normal thyroid. Cardiovascular: S1 S2 RRR no rubs or murmurs. Pulmonary: clear BL. No accessory muscle usage. Abdominal Exam: Soft, NT ND, no hepato or spleno megaly, +BS, no ascites. IMPRESSION: Mr. Doris Castañeda is a 32 y.o. male with GERD. Continue Protonix twice a day. Add Pepcid 40 mg at bedtime. Follow-up in 4 to 6 weeks. If this does not work we may need to give him Protonix 3 times a day. Thank you for allowing me to participate in the care of Mr. Doris Castañeda. For any further questions please do not hesitate to contact me. I have reviewed and agree with the ROS entered by the MA/LPN. Note is dictated utilizing voice recognition software. Unfortunately this leads to occasional typographical errors. Please contact our office if you have any questions.     Kylie Bean MD  [facility]

## 2021-10-29 RX ORDER — PANTOPRAZOLE SODIUM 40 MG/1
40 TABLET, DELAYED RELEASE ORAL 2 TIMES DAILY
Qty: 60 TABLET | Refills: 11 | OUTPATIENT
Start: 2021-10-29

## 2021-12-01 DIAGNOSIS — R10.13 EPIGASTRIC PAIN: ICD-10-CM

## 2021-12-01 DIAGNOSIS — R11.0 NAUSEA: ICD-10-CM

## 2021-12-01 RX ORDER — FAMOTIDINE 40 MG/1
40 TABLET, FILM COATED ORAL EVERY EVENING
Qty: 30 TABLET | Refills: 1 | Status: SHIPPED | OUTPATIENT
Start: 2021-12-01 | End: 2022-01-12 | Stop reason: SDUPTHER

## 2022-01-12 ENCOUNTER — OFFICE VISIT (OUTPATIENT)
Dept: GASTROENTEROLOGY | Age: 27
End: 2022-01-12
Payer: COMMERCIAL

## 2022-01-12 VITALS
DIASTOLIC BLOOD PRESSURE: 102 MMHG | BODY MASS INDEX: 29.68 KG/M2 | TEMPERATURE: 98 F | HEART RATE: 114 BPM | SYSTOLIC BLOOD PRESSURE: 148 MMHG | HEIGHT: 71 IN | WEIGHT: 212 LBS

## 2022-01-12 DIAGNOSIS — R11.0 NAUSEA: ICD-10-CM

## 2022-01-12 DIAGNOSIS — R10.13 EPIGASTRIC PAIN: ICD-10-CM

## 2022-01-12 PROCEDURE — G8417 CALC BMI ABV UP PARAM F/U: HCPCS | Performed by: INTERNAL MEDICINE

## 2022-01-12 PROCEDURE — 99213 OFFICE O/P EST LOW 20 MIN: CPT | Performed by: INTERNAL MEDICINE

## 2022-01-12 PROCEDURE — 1036F TOBACCO NON-USER: CPT | Performed by: INTERNAL MEDICINE

## 2022-01-12 PROCEDURE — G8484 FLU IMMUNIZE NO ADMIN: HCPCS | Performed by: INTERNAL MEDICINE

## 2022-01-12 PROCEDURE — G8427 DOCREV CUR MEDS BY ELIG CLIN: HCPCS | Performed by: INTERNAL MEDICINE

## 2022-01-12 RX ORDER — PANTOPRAZOLE SODIUM 40 MG/1
40 TABLET, DELAYED RELEASE ORAL 2 TIMES DAILY
Qty: 180 TABLET | Refills: 3 | Status: SHIPPED | OUTPATIENT
Start: 2022-01-12 | End: 2022-01-12 | Stop reason: SDUPTHER

## 2022-01-12 RX ORDER — DICYCLOMINE HCL 20 MG
20 TABLET ORAL 3 TIMES DAILY PRN
Qty: 90 TABLET | Refills: 3 | Status: SHIPPED | OUTPATIENT
Start: 2022-01-12 | End: 2022-07-08 | Stop reason: ALTCHOICE

## 2022-01-12 RX ORDER — FAMOTIDINE 40 MG/1
40 TABLET, FILM COATED ORAL EVERY EVENING
Qty: 90 TABLET | Refills: 1 | Status: SHIPPED | OUTPATIENT
Start: 2022-01-12 | End: 2022-01-12 | Stop reason: SDUPTHER

## 2022-01-12 RX ORDER — PANTOPRAZOLE SODIUM 40 MG/1
40 TABLET, DELAYED RELEASE ORAL 2 TIMES DAILY
Qty: 180 TABLET | Refills: 3 | Status: SHIPPED | OUTPATIENT
Start: 2022-01-12

## 2022-01-12 RX ORDER — FAMOTIDINE 40 MG/1
40 TABLET, FILM COATED ORAL EVERY EVENING
Qty: 90 TABLET | Refills: 3 | Status: SHIPPED | OUTPATIENT
Start: 2022-01-12 | End: 2022-07-05 | Stop reason: SDUPTHER

## 2022-01-12 ASSESSMENT — ENCOUNTER SYMPTOMS
VOMITING: 0
BLOOD IN STOOL: 0
COUGH: 0
RECTAL PAIN: 0
ABDOMINAL DISTENTION: 0
NAUSEA: 0
CONSTIPATION: 0
DIARRHEA: 0
ANAL BLEEDING: 0
TROUBLE SWALLOWING: 0
WHEEZING: 0
ABDOMINAL PAIN: 0
SHORTNESS OF BREATH: 0
COLOR CHANGE: 0

## 2022-01-12 NOTE — PROGRESS NOTES
GI CLINIC FOLLOW UP    INTERVAL HISTORY:   No referring provider defined for this encounter. Chief Complaint   Patient presents with    Gastroesophageal Reflux           HISTORY OF PRESENT ILLNESS: Danika Khan is a 32 y.o. male with GERD. She has been doing okay. Mild intermittent symptoms. Not as bad as before. Uses Bentyl also for abdominal pain intermittently. Past Medical,Family, and Social History reviewed and does not contribute to the patient presenting condition. Patient's PMH/PSH,SH,PSYCH Hx, MEDs, ALLERGIES, and ROS were all reviewed and updated in the appropriate sections.     PAST MEDICAL HISTORY:  Past Medical History:   Diagnosis Date    Anxiety     History of chicken pox 2002    Hyperlipidemia     Hypertension     OCD (obsessive compulsive disorder)        Past Surgical History:   Procedure Laterality Date    CAPSULE ENDOSCOPY N/A 6/14/2021    ESOPHAGEAL CAPSULE ENDOSCOPY / FOURNIER performed by Oumou Kramer MD at [facility] N/A 9/2/2020    EGD BIOPSY performed by Oumou Kramer MD at [address]:    Current Outpatient Medications:     amLODIPine (NORVASC) 10 MG tablet, Take 1 tablet by mouth daily, Disp: 90 tablet, Rfl: 1    atorvastatin (LIPITOR) 20 MG tablet, Take 1 tablet by mouth daily, Disp: 90 tablet, Rfl: 1    Cholecalciferol (VITAMIN D3) 1.25 MG (72279 UT) CAPS, take 1 capsule by mouth every week, Disp: 12 capsule, Rfl: 1    levocetirizine (XYZAL) 5 MG tablet, Take 1 tablet by mouth every morning, Disp: 90 tablet, Rfl: 1    losartan (COZAAR) 100 MG tablet, Take 1 tablet by mouth daily, Disp: 90 tablet, Rfl: 1    montelukast (SINGULAIR) 10 MG tablet, Take 1 tablet by mouth daily, Disp: 90 tablet, Rfl: 1    famotidine (PEPCID) 40 MG tablet, Take 1 tablet by mouth every evening, Disp: 30 tablet, Rfl: 1    pantoprazole (PROTONIX) 40 MG tablet, Take 1 tablet by mouth 2 times daily, Disp: 60 tablet, Rfl: 3    ondansetron (ZOFRAN ODT) 8 MG TBDP disintegrating tablet, Place 1 tablet under the tongue every 12 hours as needed for Nausea or Vomiting, Disp: 30 tablet, Rfl: 0    Multiple Vitamins-Minerals (THERAPEUTIC MULTIVITAMIN-MINERALS) tablet, Take 1 tablet by mouth daily, Disp: , Rfl:     sertraline (ZOLOFT) 100 MG tablet, Take 200 mg by mouth, Disp: , Rfl:     amitriptyline (ELAVIL) 25 MG tablet, Take by mouth, Disp: , Rfl:     ALLERGIES:   No Known Allergies    FAMILY HISTORY:       Problem Relation Age of Onset    Heart Disease Mother          SOCIAL HISTORY:   Social History     Socioeconomic History    Marital status: Single     Spouse name: Not on file    Number of children: Not on file    Years of education: Not on file    Highest education level: Not on file   Occupational History    Not on file   Tobacco Use    Smoking status: Never Smoker    Smokeless tobacco: Never Used    Tobacco comment: Occasional cigar with relatives ( major holidays)   Vaping Use    Vaping Use: Never used   Substance and Sexual Activity    Alcohol use: Yes     Alcohol/week: 0.0 standard drinks     Comment: socially    Drug use: No    Sexual activity: Not on file   Other Topics Concern    Not on file   Social History Narrative    Not on file     Social Determinants of Health     Financial Resource Strain:     Difficulty of Paying Living Expenses: Not on file   Food Insecurity:     Worried About Running Out of Food in the Last Year: Not on file    Jacinta of Food in the Last Year: Not on file   Transportation Needs:     Lack of Transportation (Medical): Not on file    Lack of Transportation (Non-Medical):  Not on file   Physical Activity:     Days of Exercise per Week: Not on file    Minutes of Exercise per Session: Not on file   Stress:     Feeling of Stress : Not on file   Social Connections:     Frequency of Communication with Friends and Family: Not on file    Frequency of Social Gatherings with Friends and Family: Not on file    Attends Temple Services: Not on file    Active Member of Clubs or Organizations: Not on file    Attends Club or Organization Meetings: Not on file    Marital Status: Not on file   Intimate Partner Violence:     Fear of Current or Ex-Partner: Not on file    Emotionally Abused: Not on file    Physically Abused: Not on file    Sexually Abused: Not on file   Housing Stability:     Unable to Pay for Housing in the Last Year: Not on file    Number of [place] in the Last Year: Not on file    Unstable Housing in the Last Year: Not on file       REVIEW OF SYSTEMS: A 12-point review of systemswas obtained and pertinent positives and negatives were enumerated above in the history of present illness. All other reviewed systems / symptoms were negative. Review of Systems   Constitutional: Negative for appetite change and unexpected weight change. HENT: Negative for trouble swallowing. Eyes: Positive for visual disturbance. Respiratory: Negative for cough, shortness of breath and wheezing. Gastrointestinal: Negative for abdominal distention, abdominal pain, anal bleeding, blood in stool, constipation, diarrhea, nausea, rectal pain and vomiting. Genitourinary: Negative for difficulty urinating. Musculoskeletal: Negative for joint swelling. Skin: Negative for color change, rash and wound. Neurological: Negative for dizziness, weakness, light-headedness and headaches. Hematological: Does not bruise/bleed easily. Psychiatric/Behavioral: Negative for sleep disturbance.            LABORATORY DATA: Reviewed  Lab Results   Component Value Date    WBC 6.0 07/24/2020    HGB 13.8 07/24/2020    HCT 39.9 (L) 07/24/2020    MCV 82.0 07/24/2020     07/24/2020     07/24/2020    K 4.3 07/24/2020     07/24/2020    CO2 26 07/24/2020    BUN 20 07/24/2020    CREATININE 1.83 (H) 07/24/2020    LABALBU 4.6 07/24/2020    BILITOT 0.78 07/24/2020    ALKPHOS 68 07/24/2020    AST 24 07/24/2020    ALT 22 07/24/2020         Lab Results   Component Value Date    RBC 4.87 07/24/2020    HGB 13.8 07/24/2020    MCV 82.0 07/24/2020    MCH 28.3 07/24/2020    MCHC 34.5 07/24/2020    RDW 11.8 07/24/2020    MPV 7.0 07/24/2020    BASOPCT 1 03/10/2018    LYMPHSABS 2.00 03/10/2018    MONOSABS 0.60 03/10/2018    NEUTROABS 3.40 03/10/2018    EOSABS 0.10 03/10/2018    BASOSABS 0.00 03/10/2018         DIAGNOSTIC TESTING:     No results found. PHYSICAL EXAMINATION: Vital signs reviewed per the nursing documentation. BP (!) 155/103 (Site: Left Upper Arm, Position: Sitting, Cuff Size: Large Adult)   Pulse 89   Temp 98 °F (36.7 °C)   Ht 5' 11\" (1.803 m)   Wt 212 lb (96.2 kg)   BMI 29.57 kg/m²   Body mass index is 29.57 kg/m². Physical Exam      I personally reviewed the nurse's notes and documentation and I agree with her notes. General: alert, appears stated age and cooperative Psych: Normal. and Alert and oriented, appropriate affect. . Normal affect. Mentation normal  HEENT: PERRLA. Clear conjunctivae and sclerae. Moist oral mucosae, no lesions or ulcers. The neck is supple, without lymphadenopathy or jugular venous distension. No masses. Normal thyroid. Cardiovascular: S1 S2 RRR no rubs or murmurs. Pulmonary: clear BL. No accessory muscle usage. Abdominal Exam: Soft, NT ND, no hepato or spleno megaly, +BS, no ascites. IMPRESSION: Mr. Effie Haley is a 32 y.o. male with GERD. Doing okay. Continue Protonix twice a day and Pepcid. There is component of anxiety. We will give him prescription for Bentyl to be used on as-needed basis. Follow-up in 6 months. Thank you for allowing me to participate in the care of Mr. Effie Haley. For any further questions please do not hesitate to contact me. I have reviewed and agree with the ROS entered by the MA/LPN. Note is dictated utilizing voice recognition software.  Unfortunately this leads to occasional typographical errors. Please contact our office if you have any questions.     Jerardo Turner MD  [facility]  O: #[phone]

## 2022-03-30 ENCOUNTER — HOSPITAL ENCOUNTER (OUTPATIENT)
Age: 27
Discharge: HOME | End: 2022-04-01
Payer: COMMERCIAL

## 2022-03-30 ENCOUNTER — HOSPITAL ENCOUNTER (OUTPATIENT)
Dept: GENERAL RADIOLOGY | Age: 27
Discharge: HOME | End: 2022-04-01
Payer: COMMERCIAL

## 2022-03-30 ENCOUNTER — OFFICE VISIT (OUTPATIENT)
Dept: FAMILY MEDICINE CLINIC | Age: 27
End: 2022-03-30
Payer: COMMERCIAL

## 2022-03-30 VITALS
SYSTOLIC BLOOD PRESSURE: 133 MMHG | HEART RATE: 104 BPM | OXYGEN SATURATION: 98 % | TEMPERATURE: 98.1 F | DIASTOLIC BLOOD PRESSURE: 88 MMHG

## 2022-03-30 DIAGNOSIS — M79.671 FOOT PAIN, RIGHT: Primary | ICD-10-CM

## 2022-03-30 PROCEDURE — 73630 X-RAY EXAM OF FOOT: CPT

## 2022-03-30 PROCEDURE — 99213 OFFICE O/P EST LOW 20 MIN: CPT | Performed by: NURSE PRACTITIONER

## 2022-03-30 ASSESSMENT — PATIENT HEALTH QUESTIONNAIRE - PHQ9
1. LITTLE INTEREST OR PLEASURE IN DOING THINGS: 0
SUM OF ALL RESPONSES TO PHQ QUESTIONS 1-9: 0
DEPRESSION UNABLE TO ASSESS: FUNCTIONAL CAPACITY MOTIVATION LIMITS ACCURACY
SUM OF ALL RESPONSES TO PHQ QUESTIONS 1-9: 0
2. FEELING DOWN, DEPRESSED OR HOPELESS: 0
SUM OF ALL RESPONSES TO PHQ9 QUESTIONS 1 & 2: 0

## 2022-03-30 NOTE — PATIENT INSTRUCTIONS
Patient Education        Foot Sprain: Care Instructions  Your Care Instructions     A foot sprain occurs when you stretch or tear the ligaments around your foot. Ligaments are the tough tissues that connect one bone to another. A sprain can happen when you run, fall, or hit your toe against something. Sprains often happen when you jump or change direction quickly. This may occur when you playbasketball, soccer, or other sports. Most foot sprains will get better with treatment at home. Follow-up care is a key part of your treatment and safety. Be sure to make and go to all appointments, and call your doctor if you are having problems. It's also a good idea to know your test results and keep alist of the medicines you take. How can you care for yourself at home?  Walk or put weight on your sprained foot as long as it does not hurt.  If your doctor gave you a splint or immobilizer, wear it as directed. If you were given crutches, use them as directed.  For the first 2 days after your injury, avoid hot showers, hot tubs, or hot packs. They may increase swelling.  Put ice or a cold pack on your foot for 10 to 20 minutes at a time to stop swelling. Try this every 1 to 2 hours for 3 days (when you are awake) or until the swelling goes down. Put a thin cloth between the ice pack and your skin. Keep your splint dry.  After 2 or 3 days, if your swelling is gone, put a heating pad (set on low) or a warm cloth on your foot. Some doctors suggest that you go back and forth between hot and cold treatments.  Prop up your foot on a pillow when you ice it or anytime you sit or lie down. Try to keep it above the level of your heart. This will help reduce swelling.  Take pain medicines exactly as directed. ? If the doctor gave you a prescription medicine for pain, take it as prescribed. ? If you are not taking a prescription pain medicine, ask your doctor if you can take an over-the-counter medicine.    Do any exercises that your doctor or physical therapist suggests.  Return to your usual exercise gradually as you feel better. When should you call for help? Call your doctor now or seek immediate medical care if:     You have increased or severe pain.      Your toes are cool or pale or change color.      Your wrap or splint feels too tight.      You have signs of a blood clot, such as:  ? Pain in your calf, back of the knee, thigh, or groin. ? Redness and swelling in your leg or groin.      You have tingling, weakness, or numbness in your leg or foot. Watch closely for changes in your health, and be sure to contact your doctor if:     You cannot put any weight on your foot.      You get a fever.      You do not get better as expected. Where can you learn more? Go to https://Samanagepearcbazar.comeweb.VB Rags. org and sign in to your Re[employer] account. Enter L727 in the Product[employer] box to learn more about \"Foot Sprain: Care Instructions. \"     If you do not have an account, please click on the \"Sign Up Now\" link. Current as of: July 1, 2021               Content Version: 13.2  © 2006-2022 [employer]. Care instructions adapted under license by [facility] ([facility]). If you have questions about a medical condition or this instruction, always ask your healthcare professional. [address] any warranty or liability for your use of this information. Patient Education        Foot Pain: Care Instructions  Your Care Instructions     Foot injuries that cause pain and swelling are fairly common. Almost all sports or home repair projects can cause a misstep that ends up as foot pain. Normalwear and tear, especially as you get older, also can cause foot pain. Most minor foot injuries will heal on their own, and home treatment is usually all you need to do. If you have a severe injury, you may need tests andtreatment.   Follow-up care is a key part of your treatment and safety. Be sure to make and go to all appointments, and call your doctor if you are having problems. It's also a good idea to know your test results and keep alist of the medicines you take. How can you care for yourself at home?  Take pain medicines exactly as directed. ? If the doctor gave you a prescription medicine for pain, take it as prescribed. ? If you are not taking a prescription pain medicine, ask your doctor if you can take an over-the-counter medicine.  Rest and protect your foot. Take a break from any activity that may cause pain.  Put ice or a cold pack on your foot for 10 to 20 minutes at a time. Put a thin cloth between the ice and your skin.  Prop up the sore foot on a pillow when you ice it or anytime you sit or lie down during the next 3 days. Try to keep it above the level of your heart. This will help reduce swelling.  Your doctor may recommend that you wrap your foot with an elastic bandage. Keep your foot wrapped for as long as your doctor advises.  If your doctor recommends crutches, use them as directed.  Wear roomy footwear.  As soon as pain and swelling end, begin gentle exercises of your foot. Your doctor can tell you which exercises will help. When should you call for help? Call 911 anytime you think you may need emergency care. For example, call if:     Your foot turns pale, white, blue, or cold. Call your doctor now or seek immediate medical care if:     You cannot move or stand on your foot.      Your foot looks twisted or out of its normal position.      Your foot is not stable when you step down.      You have signs of infection, such as:  ? Increased pain, swelling, warmth, or redness. ? Red streaks leading from the sore area. ? Pus draining from a place on your foot. ? A fever.      Your foot is numb or tingly.    Watch closely for changes in your health, and be sure to contact your doctor if:     You do not get better as expected.      You have bruises from an injury that last longer than 2 weeks. Where can you learn more? Go to https://chpepiceweb.Syntasia. org and sign in to your [employer]ite[employer] account. Enter Y935 in the [facility] box to learn more about \"Foot Pain: Care Instructions. \"     If you do not have an account, please click on the \"Sign Up Now\" link. Current as of: July 1, 2021               Content Version: 13.2  © 2006-2022 [employer]. Care instructions adapted under license by [facility] ([facility]). If you have questions about a medical condition or this instruction, always ask your healthcare professional. [address] any warranty or liability for your use of this information.

## 2022-03-30 NOTE — PROGRESS NOTES
[address]  [address] [address]  Dept: [phone]  Dept Fax: [phone]    Donita Toro is a 32 y.o. male who presents to the urgent care today for his medical conditions/complaints as notedbelow. Donita Toro is c/o of Foot Pain (onset for 3 days right side of foot/ankle)      HPI:     32 yr old male presents for c/o recurring rt foot pain. He runs long distances on asphalt and is active. Sx intermittent for cpl years. This instance is worse now. Swells up and takes naproxen and sx improve. Foot Pain   The pain is present in the right foot and right ankle. This is a recurrent problem. The current episode started in the past 7 days (3 days). There has been no history of extremity trauma. The problem occurs constantly. The problem has been gradually worsening. The quality of the pain is described as aching. The pain is at a severity of 7/10. The pain is moderate. Pertinent negatives include no fever, inability to bear weight, itching, joint locking, joint swelling, limited range of motion, numbness, stiffness or tingling. The symptoms are aggravated by activity. He has tried NSAIDS for the symptoms. The treatment provided no relief. There is no history of diabetes, gout, osteoarthritis or rheumatoid arthritis.        Past Medical History:   Diagnosis Date    Anxiety     History of chicken pox 2002    Hyperlipidemia     Hypertension     OCD (obsessive compulsive disorder)         Current Outpatient Medications   Medication Sig Dispense Refill    dicyclomine (BENTYL) 20 MG tablet Take 1 tablet by mouth 3 times daily as needed (abd pain) 90 tablet 3    pantoprazole (PROTONIX) 40 MG tablet Take 1 tablet by mouth 2 times daily 180 tablet 3    famotidine (PEPCID) 40 MG tablet Take 1 tablet by mouth every evening 90 tablet 3    Cholecalciferol (VITAMIN D3) 1.25 MG (14517 UT) CAPS take 1 capsule by mouth every week 12 capsule 1    levocetirizine (XYZAL) 5 MG tablet Take 1 tablet by mouth every morning 90 tablet 1    montelukast (SINGULAIR) 10 MG tablet Take 1 tablet by mouth daily 90 tablet 1    ondansetron (ZOFRAN ODT) 8 MG TBDP disintegrating tablet Place 1 tablet under the tongue every 12 hours as needed for Nausea or Vomiting 30 tablet 0    Multiple Vitamins-Minerals (THERAPEUTIC MULTIVITAMIN-MINERALS) tablet Take 1 tablet by mouth daily      sertraline (ZOLOFT) 100 MG tablet Take 200 mg by mouth      amLODIPine (NORVASC) 10 MG tablet Take 1 tablet by mouth daily 90 tablet 1    atorvastatin (LIPITOR) 20 MG tablet Take 1 tablet by mouth daily 90 tablet 1    losartan (COZAAR) 100 MG tablet Take 1 tablet by mouth daily 90 tablet 1    amitriptyline (ELAVIL) 25 MG tablet Take by mouth       No current facility-administered medications for this visit. No Known Allergies    Subjective:      Review of Systems   Constitutional: Negative for fever. Musculoskeletal: Negative for gout and stiffness. Skin: Negative for itching. Neurological: Negative for tingling and numbness. All other systems reviewed and are negative. 14 systems reviewed and negative except as listed in HPI. Objective:     Physical Exam  Vitals and nursing note reviewed. Constitutional:       General: He is not in acute distress. Appearance: Normal appearance. He is not ill-appearing, toxic-appearing or diaphoretic. HENT:      Head: Normocephalic and atraumatic. Right Ear: External ear normal.      Left Ear: External ear normal.   Eyes:      General: No scleral icterus. Right eye: No discharge. Left eye: No discharge. Conjunctiva/sclera: Conjunctivae normal.   Cardiovascular:      Rate and Rhythm: Normal rate. Pulses: Normal pulses. Pulmonary:      Effort: Pulmonary effort is normal.   Abdominal:      Palpations: Abdomen is soft.    Musculoskeletal:         General: Swelling and tenderness present. No deformity. Cervical back: Neck supple. Right lower leg: No edema. Left lower leg: No edema. Feet:       Comments: + full rom   Feet:      Comments: Rt lateral foot pain just below lateral malleolus, mild swelling and tenderness extends over anteriotalofibular ligament. No discoloration. No redness or increased warmth. + tenderness with foot extension and inversion. No crepitus or deformity  Skin:     General: Skin is warm and dry. Capillary Refill: Capillary refill takes less than 2 seconds. Findings: No bruising, erythema, lesion or rash. Neurological:      General: No focal deficit present. Mental Status: He is alert. Psychiatric:         Mood and Affect: Mood normal.       /88 (Site: Left Upper Arm, Position: Sitting, Cuff Size: Large Adult)   Pulse 104   Temp 98.1 °F (36.7 °C) (Infrared)   SpO2 98%     Assessment:       Diagnosis Orders   1. Foot pain, right  XR FOOT RIGHT (MIN 3 VIEWS)    [address] [place] [address], Podiatry, [place]       Plan:    recurring rt foot pain/sprain   Worse this time  Refer podiatry - requests Dr. Jayla Proctor reviewed home naproxen or ibuprofen  Return worse  Ace wrap provided  Return for make appt with Specialist - referral given. No orders of the defined types were placed in this encounter. Patient given educational materials - see patient instructions. Discussed use, benefit, and side effects of prescribed medications. All patient questions answered. Pt voicedunderstanding.     Electronically signed by OLIVE Li CNP on 3/30/2022 at 9:07 AM

## 2022-05-02 DIAGNOSIS — R19.7 DIARRHEA, UNSPECIFIED TYPE: Primary | ICD-10-CM

## 2022-05-02 RX ORDER — SUCRALFATE 1 G/1
1 TABLET ORAL 3 TIMES DAILY
Qty: 120 TABLET | Refills: 3 | Status: SHIPPED | OUTPATIENT
Start: 2022-05-02

## 2022-05-02 NOTE — TELEPHONE ENCOUNTER
Patient called as he had stopped the Carafate but realized it didn't give him a headache and it made his stomach feel better. Writer refilled RX per Dr Tata Hastings.

## 2022-05-25 ENCOUNTER — HOSPITAL ENCOUNTER (OUTPATIENT)
Age: 27
Discharge: HOME | End: 2022-05-25
Payer: COMMERCIAL

## 2022-05-25 DIAGNOSIS — E78.49 OTHER HYPERLIPIDEMIA: ICD-10-CM

## 2022-05-25 DIAGNOSIS — I10 ESSENTIAL HYPERTENSION: ICD-10-CM

## 2022-05-25 LAB
ALBUMIN SERPL-MCNC: 4.5 G/DL (ref 3.5–5.2)
ALP BLD-CCNC: 89 U/L (ref 40–129)
ALT SERPL-CCNC: 21 U/L (ref 5–41)
ANION GAP SERPL CALCULATED.3IONS-SCNC: 12 MMOL/L (ref 9–17)
AST SERPL-CCNC: 27 U/L
BILIRUB SERPL-MCNC: 0.98 MG/DL (ref 0.3–1.2)
BUN BLDV-MCNC: 25 MG/DL (ref 6–20)
CALCIUM SERPL-MCNC: 9.5 MG/DL (ref 8.6–10.4)
CHLORIDE BLD-SCNC: 95 MMOL/L (ref 98–107)
CHOLESTEROL/HDL RATIO: 4.2
CHOLESTEROL: 137 MG/DL
CO2: 27 MMOL/L (ref 20–31)
CREAT SERPL-MCNC: 2.01 MG/DL (ref 0.7–1.2)
GFR AFRICAN AMERICAN: 49 ML/MIN
GFR NON-AFRICAN AMERICAN: 40 ML/MIN
GFR SERPL CREATININE-BSD FRML MDRD: ABNORMAL ML/MIN/{1.73_M2}
GLUCOSE BLD-MCNC: 76 MG/DL (ref 70–99)
HCT VFR BLD CALC: 42.5 % (ref 41–53)
HDLC SERPL-MCNC: 33 MG/DL
HEMOGLOBIN: 14.9 G/DL (ref 13.5–17.5)
LDL CHOLESTEROL: 70 MG/DL (ref 0–130)
MCH RBC QN AUTO: 30.1 PG (ref 26–34)
MCHC RBC AUTO-ENTMCNC: 35 G/DL (ref 31–37)
MCV RBC AUTO: 86.1 FL (ref 80–100)
PDW BLD-RTO: 12.3 % (ref 11.5–14.9)
PLATELET # BLD: 262 K/UL (ref 150–450)
PMV BLD AUTO: 6.7 FL (ref 6–12)
POTASSIUM SERPL-SCNC: 3.8 MMOL/L (ref 3.7–5.3)
RBC # BLD: 4.94 M/UL (ref 4.5–5.9)
SODIUM BLD-SCNC: 134 MMOL/L (ref 135–144)
TOTAL PROTEIN: 7.3 G/DL (ref 6.4–8.3)
TRIGL SERPL-MCNC: 172 MG/DL
TSH SERPL DL<=0.05 MIU/L-ACNC: 2.52 UIU/ML (ref 0.3–5)
WBC # BLD: 6.4 K/UL (ref 3.5–11)

## 2022-05-25 PROCEDURE — 80061 LIPID PANEL: CPT

## 2022-05-25 PROCEDURE — 80053 COMPREHEN METABOLIC PANEL: CPT

## 2022-05-25 PROCEDURE — 36415 COLL VENOUS BLD VENIPUNCTURE: CPT

## 2022-05-25 PROCEDURE — 84443 ASSAY THYROID STIM HORMONE: CPT

## 2022-05-25 PROCEDURE — 85027 COMPLETE CBC AUTOMATED: CPT

## 2022-05-26 PROBLEM — F33.1 MAJOR DEPRESSIVE DISORDER, RECURRENT EPISODE, MODERATE (HCC): Status: ACTIVE | Noted: 2017-09-07

## 2022-05-26 PROBLEM — F41.1 GENERALIZED ANXIETY DISORDER: Status: ACTIVE | Noted: 2017-09-07

## 2022-06-15 PROBLEM — N18.31 STAGE 3A CHRONIC KIDNEY DISEASE (HCC): Status: ACTIVE | Noted: 2022-06-15

## 2022-06-25 ENCOUNTER — E-VISIT (OUTPATIENT)
Dept: PRIMARY CARE CLINIC | Age: 27
End: 2022-06-25
Payer: COMMERCIAL

## 2022-06-25 DIAGNOSIS — H01.009 BLEPHARITIS, UNSPECIFIED LATERALITY, UNSPECIFIED TYPE: Primary | ICD-10-CM

## 2022-06-25 PROCEDURE — 99422 OL DIG E/M SVC 11-20 MIN: CPT | Performed by: NURSE PRACTITIONER

## 2022-06-25 RX ORDER — POLYMYXIN B SULFATE AND TRIMETHOPRIM 1; 10000 MG/ML; [USP'U]/ML
1 SOLUTION OPHTHALMIC EVERY 4 HOURS
Qty: 10 ML | Refills: 0 | Status: SHIPPED | OUTPATIENT
Start: 2022-06-25 | End: 2022-07-05

## 2022-06-26 NOTE — PROGRESS NOTES
Reviewed questionnaire and photos    Reviewed meds/allergies    Dx Blepharitis    Plan Rx given for polytrim, follow up with PCP if no improvement    Time spent on visit 11 min

## 2022-06-29 ENCOUNTER — TELEPHONE (OUTPATIENT)
Dept: GASTROENTEROLOGY | Age: 27
End: 2022-06-29

## 2022-06-30 ENCOUNTER — HOSPITAL ENCOUNTER (OUTPATIENT)
Age: 27
Discharge: HOME | End: 2022-06-30
Payer: COMMERCIAL

## 2022-06-30 DIAGNOSIS — N18.32 STAGE 3B CHRONIC KIDNEY DISEASE (HCC): ICD-10-CM

## 2022-06-30 DIAGNOSIS — I12.9 BENIGN HYPERTENSION WITH CHRONIC KIDNEY DISEASE, STAGE III (HCC): ICD-10-CM

## 2022-06-30 DIAGNOSIS — N18.30 BENIGN HYPERTENSION WITH CHRONIC KIDNEY DISEASE, STAGE III (HCC): ICD-10-CM

## 2022-06-30 LAB
ABSOLUTE EOS #: 0.3 K/UL (ref 0–0.4)
ABSOLUTE LYMPH #: 1.8 K/UL (ref 1–4.8)
ABSOLUTE MONO #: 0.7 K/UL (ref 0.1–1.3)
ALBUMIN SERPL-MCNC: 4.5 G/DL (ref 3.5–5.2)
ALP BLD-CCNC: 94 U/L (ref 40–129)
ALT SERPL-CCNC: 19 U/L (ref 5–41)
ANION GAP SERPL CALCULATED.3IONS-SCNC: 14 MMOL/L (ref 9–17)
AST SERPL-CCNC: 23 U/L
BACTERIA: ABNORMAL
BASOPHILS # BLD: 1 % (ref 0–2)
BASOPHILS ABSOLUTE: 0.1 K/UL (ref 0–0.2)
BILIRUB SERPL-MCNC: 0.74 MG/DL (ref 0.3–1.2)
BILIRUBIN URINE: NEGATIVE
BUN BLDV-MCNC: 28 MG/DL (ref 6–20)
CALCIUM SERPL-MCNC: 9.5 MG/DL (ref 8.6–10.4)
CHLORIDE BLD-SCNC: 97 MMOL/L (ref 98–107)
CO2: 27 MMOL/L (ref 20–31)
COLOR: YELLOW
CREAT SERPL-MCNC: 2.37 MG/DL (ref 0.7–1.2)
EOSINOPHILS RELATIVE PERCENT: 5 % (ref 0–4)
EPITHELIAL CELLS UA: ABNORMAL /HPF
GFR AFRICAN AMERICAN: 40 ML/MIN
GFR NON-AFRICAN AMERICAN: 33 ML/MIN
GFR SERPL CREATININE-BSD FRML MDRD: ABNORMAL ML/MIN/{1.73_M2}
GLUCOSE BLD-MCNC: 69 MG/DL (ref 70–99)
GLUCOSE URINE: NEGATIVE
HCT VFR BLD CALC: 43.3 % (ref 41–53)
HEMOGLOBIN: 15 G/DL (ref 13.5–17.5)
KETONES, URINE: NEGATIVE
LEUKOCYTE ESTERASE, URINE: NEGATIVE
LYMPHOCYTES # BLD: 29 % (ref 24–44)
MAGNESIUM: 2.2 MG/DL (ref 1.6–2.6)
MCH RBC QN AUTO: 29.6 PG (ref 26–34)
MCHC RBC AUTO-ENTMCNC: 34.7 G/DL (ref 31–37)
MCV RBC AUTO: 85.1 FL (ref 80–100)
MONOCYTES # BLD: 10 % (ref 1–7)
NITRITE, URINE: NEGATIVE
PDW BLD-RTO: 12 % (ref 11.5–14.9)
PH UA: 6.5 (ref 5–8)
PHOSPHORUS: 4.2 MG/DL (ref 2.5–4.5)
PLATELET # BLD: 246 K/UL (ref 150–450)
PMV BLD AUTO: 6.8 FL (ref 6–12)
POTASSIUM SERPL-SCNC: 4 MMOL/L (ref 3.7–5.3)
PROTEIN UA: ABNORMAL
RBC # BLD: 5.09 M/UL (ref 4.5–5.9)
RBC UA: ABNORMAL /HPF
SEG NEUTROPHILS: 55 % (ref 36–66)
SEGMENTED NEUTROPHILS ABSOLUTE COUNT: 3.5 K/UL (ref 1.3–9.1)
SODIUM BLD-SCNC: 138 MMOL/L (ref 135–144)
SPECIFIC GRAVITY UA: 1.01 (ref 1–1.03)
TOTAL PROTEIN: 7.3 G/DL (ref 6.4–8.3)
TURBIDITY: CLEAR
URINE HGB: ABNORMAL
UROBILINOGEN, URINE: NORMAL
WBC # BLD: 6.4 K/UL (ref 3.5–11)
WBC UA: ABNORMAL /HPF

## 2022-06-30 PROCEDURE — 83735 ASSAY OF MAGNESIUM: CPT

## 2022-06-30 PROCEDURE — 81001 URINALYSIS AUTO W/SCOPE: CPT

## 2022-06-30 PROCEDURE — 85025 COMPLETE CBC W/AUTO DIFF WBC: CPT

## 2022-06-30 PROCEDURE — 36415 COLL VENOUS BLD VENIPUNCTURE: CPT

## 2022-06-30 PROCEDURE — 80053 COMPREHEN METABOLIC PANEL: CPT

## 2022-06-30 PROCEDURE — 84100 ASSAY OF PHOSPHORUS: CPT

## 2022-07-05 DIAGNOSIS — R11.0 NAUSEA: ICD-10-CM

## 2022-07-05 DIAGNOSIS — R10.13 EPIGASTRIC PAIN: ICD-10-CM

## 2022-07-05 RX ORDER — FAMOTIDINE 40 MG/1
40 TABLET, FILM COATED ORAL EVERY EVENING
Qty: 90 TABLET | Refills: 3 | Status: SHIPPED | OUTPATIENT
Start: 2022-07-05 | End: 2022-10-06 | Stop reason: SDUPTHER

## 2022-07-23 ENCOUNTER — HOSPITAL ENCOUNTER (OUTPATIENT)
Dept: ULTRASOUND IMAGING | Age: 27
Discharge: HOME | End: 2022-07-25
Payer: COMMERCIAL

## 2022-07-23 ENCOUNTER — HOSPITAL ENCOUNTER (OUTPATIENT)
Dept: VASCULAR LAB | Age: 27
Discharge: HOME | End: 2022-07-23
Payer: COMMERCIAL

## 2022-07-23 ENCOUNTER — HOSPITAL ENCOUNTER (OUTPATIENT)
Age: 27
Discharge: HOME | End: 2022-07-23
Payer: COMMERCIAL

## 2022-07-23 DIAGNOSIS — N18.30 BENIGN HYPERTENSION WITH CHRONIC KIDNEY DISEASE, STAGE III (HCC): ICD-10-CM

## 2022-07-23 DIAGNOSIS — N18.32 STAGE 3B CHRONIC KIDNEY DISEASE (HCC): ICD-10-CM

## 2022-07-23 DIAGNOSIS — I12.9 BENIGN HYPERTENSION WITH CHRONIC KIDNEY DISEASE, STAGE III (HCC): ICD-10-CM

## 2022-07-23 DIAGNOSIS — R31.29 OTHER MICROSCOPIC HEMATURIA: ICD-10-CM

## 2022-07-23 DIAGNOSIS — R80.9 PROTEINURIA, UNSPECIFIED TYPE: ICD-10-CM

## 2022-07-23 LAB
C-REACTIVE PROTEIN: <3 MG/L (ref 0–5)
COMPLEMENT C3: 111 MG/DL (ref 90–180)
COMPLEMENT C4: 33 MG/DL (ref 10–40)
FREE KAPPA/LAMBDA RATIO: 1.66 (ref 0.26–1.65)
IGA: 260 MG/DL (ref 70–400)
IGG: 1041 MG/DL (ref 700–1600)
IGM: 223 MG/DL (ref 40–230)
KAPPA FREE LIGHT CHAINS QNT: 5.62 MG/DL (ref 0.37–1.94)
LAMBDA FREE LIGHT CHAINS QNT: 3.38 MG/DL (ref 0.57–2.63)
URIC ACID: 9.5 MG/DL (ref 3.4–7)

## 2022-07-23 PROCEDURE — 93975 VASCULAR STUDY: CPT

## 2022-07-23 PROCEDURE — 86225 DNA ANTIBODY NATIVE: CPT

## 2022-07-23 PROCEDURE — 83883 ASSAY NEPHELOMETRY NOT SPEC: CPT

## 2022-07-23 PROCEDURE — 82088 ASSAY OF ALDOSTERONE: CPT

## 2022-07-23 PROCEDURE — 76770 US EXAM ABDO BACK WALL COMP: CPT

## 2022-07-23 PROCEDURE — 84244 ASSAY OF RENIN: CPT

## 2022-07-23 PROCEDURE — 82784 ASSAY IGA/IGD/IGG/IGM EACH: CPT

## 2022-07-23 PROCEDURE — 83516 IMMUNOASSAY NONANTIBODY: CPT

## 2022-07-23 PROCEDURE — 83835 ASSAY OF METANEPHRINES: CPT

## 2022-07-23 PROCEDURE — 36415 COLL VENOUS BLD VENIPUNCTURE: CPT

## 2022-07-23 PROCEDURE — 86160 COMPLEMENT ANTIGEN: CPT

## 2022-07-23 PROCEDURE — 84550 ASSAY OF BLOOD/URIC ACID: CPT

## 2022-07-23 PROCEDURE — 86140 C-REACTIVE PROTEIN: CPT

## 2022-07-23 PROCEDURE — 84165 PROTEIN E-PHORESIS SERUM: CPT

## 2022-07-23 PROCEDURE — 86038 ANTINUCLEAR ANTIBODIES: CPT

## 2022-07-23 PROCEDURE — 84155 ASSAY OF PROTEIN SERUM: CPT

## 2022-07-23 PROCEDURE — 86162 COMPLEMENT TOTAL (CH50): CPT

## 2022-07-25 LAB
ALBUMIN (CALCULATED): 4.8 G/DL (ref 3.2–5.2)
ALBUMIN PERCENT: 67 % (ref 45–65)
ALDOSTERONE COMMENT: NORMAL
ALDOSTERONE: 4.6 NG/DL
ALPHA 1 PERCENT: 3 % (ref 3–6)
ALPHA 2 PERCENT: 8 % (ref 6–13)
ALPHA-1-GLOBULIN: 0.2 G/DL (ref 0.1–0.4)
ALPHA-2-GLOBULIN: 0.6 G/DL (ref 0.5–0.9)
ANCA MYELOPEROXIDASE: 0.3 AU/ML (ref 0–3.5)
ANCA PROTEINASE 3: <0.7 AU/ML (ref 0–2)
ANTI DNA DOUBLE STRANDED: 14 IU/ML
ANTI-NUCLEAR ANTIBODY (ANA): ABNORMAL
BETA GLOBULIN: 0.7 G/DL (ref 0.5–1.1)
BETA PERCENT: 10 % (ref 11–19)
ENA ANTIBODIES SCREEN: 0.2 U/ML
GAMMA GLOBULIN %: 13 % (ref 9–20)
GAMMA GLOBULIN: 0.9 G/DL (ref 0.5–1.5)
GBM ANTIBODY IGG: <2 AU/ML (ref 0–7)
PATHOLOGIST: ABNORMAL
PROTEIN ELECTROPHORESIS, SERUM: ABNORMAL
REASON FOR REJECTION: NORMAL
TOTAL PROT. SUM,%: 101 % (ref 98–102)
TOTAL PROT. SUM: 7.2 G/DL (ref 6.3–8.2)
TOTAL PROTEIN: 7.1 G/DL (ref 6.4–8.3)
ZZ NTE CLEAN UP: ORDERED TEST: NORMAL
ZZ NTE WITH NAME CLEAN UP: SPECIMEN SOURCE: NORMAL

## 2022-07-26 LAB
COMPLEMENT TOTAL (CH50): 33.3 U/ML (ref 38.7–89.9)
RENIN ACTIVITY: 1.8 NG/ML/HR
RENIN COMMENT: NORMAL

## 2022-07-27 LAB
METANEPH/PLASMA INTERP: NORMAL
METANEPHRINE: 0.22 NMOL/L (ref 0–0.49)
NORMETANEPHRINE PLASMA: 0.46 NMOL/L (ref 0–0.89)

## 2022-08-12 ENCOUNTER — HOSPITAL ENCOUNTER (OUTPATIENT)
Age: 27
Setting detail: SPECIMEN
Discharge: HOME | End: 2022-08-12
Payer: COMMERCIAL

## 2022-08-12 DIAGNOSIS — I12.9 BENIGN HYPERTENSION WITH CHRONIC KIDNEY DISEASE, STAGE III (HCC): ICD-10-CM

## 2022-08-12 DIAGNOSIS — R80.9 PROTEINURIA, UNSPECIFIED TYPE: ICD-10-CM

## 2022-08-12 DIAGNOSIS — R31.29 OTHER MICROSCOPIC HEMATURIA: ICD-10-CM

## 2022-08-12 DIAGNOSIS — N18.30 BENIGN HYPERTENSION WITH CHRONIC KIDNEY DISEASE, STAGE III (HCC): ICD-10-CM

## 2022-08-12 DIAGNOSIS — N18.32 STAGE 3B CHRONIC KIDNEY DISEASE (HCC): ICD-10-CM

## 2022-08-12 LAB
ALBUMIN SERPL-MCNC: 4.4 G/DL (ref 3.5–5.2)
ALP BLD-CCNC: 92 U/L (ref 40–129)
ALT SERPL-CCNC: 19 U/L (ref 5–41)
ANION GAP SERPL CALCULATED.3IONS-SCNC: 10 MMOL/L (ref 9–17)
AST SERPL-CCNC: 21 U/L
BILIRUB SERPL-MCNC: 0.8 MG/DL (ref 0.3–1.2)
BUN BLDV-MCNC: 22 MG/DL (ref 6–20)
CALCIUM SERPL-MCNC: 9.6 MG/DL (ref 8.6–10.4)
CHLORIDE BLD-SCNC: 99 MMOL/L (ref 98–107)
CO2: 27 MMOL/L (ref 20–31)
CORTISOL COLLECTION INFO: NORMAL
CORTISOL: 10.1 UG/DL (ref 2.7–18.4)
CREAT SERPL-MCNC: 2.3 MG/DL (ref 0.7–1.2)
GFR AFRICAN AMERICAN: 42 ML/MIN
GFR NON-AFRICAN AMERICAN: 34 ML/MIN
GFR SERPL CREATININE-BSD FRML MDRD: ABNORMAL ML/MIN/{1.73_M2}
GLUCOSE BLD-MCNC: 87 MG/DL (ref 70–99)
MAGNESIUM: 1.9 MG/DL (ref 1.6–2.6)
PHOSPHORUS: 3.9 MG/DL (ref 2.5–4.5)
POTASSIUM SERPL-SCNC: 4.2 MMOL/L (ref 3.7–5.3)
SODIUM BLD-SCNC: 136 MMOL/L (ref 135–144)
TOTAL PROTEIN: 6.9 G/DL (ref 6.4–8.3)

## 2022-08-12 PROCEDURE — 36415 COLL VENOUS BLD VENIPUNCTURE: CPT

## 2022-08-12 PROCEDURE — 83735 ASSAY OF MAGNESIUM: CPT

## 2022-08-12 PROCEDURE — 82384 ASSAY THREE CATECHOLAMINES: CPT

## 2022-08-12 PROCEDURE — 84100 ASSAY OF PHOSPHORUS: CPT

## 2022-08-12 PROCEDURE — 80053 COMPREHEN METABOLIC PANEL: CPT

## 2022-08-12 PROCEDURE — 82533 TOTAL CORTISOL: CPT

## 2022-08-15 ENCOUNTER — HOSPITAL ENCOUNTER (OUTPATIENT)
Age: 27
Setting detail: SPECIMEN
Discharge: HOME | End: 2022-08-15
Payer: COMMERCIAL

## 2022-08-15 DIAGNOSIS — I12.9 BENIGN HYPERTENSION WITH CHRONIC KIDNEY DISEASE, STAGE III (HCC): ICD-10-CM

## 2022-08-15 DIAGNOSIS — N18.32 STAGE 3B CHRONIC KIDNEY DISEASE (HCC): ICD-10-CM

## 2022-08-15 DIAGNOSIS — R80.9 PROTEINURIA, UNSPECIFIED TYPE: ICD-10-CM

## 2022-08-15 DIAGNOSIS — N18.30 BENIGN HYPERTENSION WITH CHRONIC KIDNEY DISEASE, STAGE III (HCC): ICD-10-CM

## 2022-08-15 DIAGNOSIS — R31.29 OTHER MICROSCOPIC HEMATURIA: ICD-10-CM

## 2022-08-15 LAB
CREAT SERPL-MCNC: 2.32 MG/DL (ref 0.7–1.2)
CREATININE CLEARANCE: 30.9 ML/MIN/BSA (ref 71–151)
CREATININE URINE: 58.1 MG/DL
CREATININE URINE: 58.1 MG/DL (ref 39–259)
CREATININE, 24H UR: 1255 MG/24 H (ref 1040–2350)
HOURS COLLECTED: 24 H
HOURS COLLECTED: 24 H
LENGTH OF COLLECTION: 24 H
PATIENT HEIGHT: 179 CM
PATIENT WEIGHT: 91.6 KG
PROTEIN 24 HOUR URINE: 432 MG/24 H
URINE TOTAL PROTEIN: 20 MG/DL
VOLUME: 2160 ML

## 2022-08-15 PROCEDURE — 84156 ASSAY OF PROTEIN URINE: CPT

## 2022-08-15 PROCEDURE — 82570 ASSAY OF URINE CREATININE: CPT

## 2022-08-15 PROCEDURE — 82384 ASSAY THREE CATECHOLAMINES: CPT

## 2022-08-15 PROCEDURE — 82575 CREATININE CLEARANCE TEST: CPT

## 2022-08-15 PROCEDURE — 83835 ASSAY OF METANEPHRINES: CPT

## 2022-08-17 LAB
CATECHOLAMINE INTERPRETATION, PLASMA: NORMAL
EPINEPHRINE PLASMA: 15 PG/ML (ref 10–200)
NOREPINEPHRINE: 354 PG/ML (ref 80–520)

## 2022-08-18 ENCOUNTER — HOSPITAL ENCOUNTER (OUTPATIENT)
Age: 27
Setting detail: SPECIMEN
Discharge: HOME | End: 2022-08-18
Payer: COMMERCIAL

## 2022-08-18 DIAGNOSIS — N18.32 STAGE 3B CHRONIC KIDNEY DISEASE (HCC): ICD-10-CM

## 2022-08-18 DIAGNOSIS — N18.30 BENIGN HYPERTENSION WITH CHRONIC KIDNEY DISEASE, STAGE III (HCC): ICD-10-CM

## 2022-08-18 DIAGNOSIS — I12.9 BENIGN HYPERTENSION WITH CHRONIC KIDNEY DISEASE, STAGE III (HCC): ICD-10-CM

## 2022-08-18 DIAGNOSIS — R80.9 PROTEINURIA, UNSPECIFIED TYPE: ICD-10-CM

## 2022-08-18 DIAGNOSIS — R31.29 OTHER MICROSCOPIC HEMATURIA: ICD-10-CM

## 2022-08-18 LAB
ABSOLUTE EOS #: 0.3 K/UL (ref 0–0.4)
ABSOLUTE LYMPH #: 1.5 K/UL (ref 1–4.8)
ABSOLUTE MONO #: 0.6 K/UL (ref 0.1–1.3)
BACTERIA: ABNORMAL
BASOPHILS # BLD: 1 % (ref 0–2)
BASOPHILS ABSOLUTE: 0 K/UL (ref 0–0.2)
BILIRUBIN URINE: NEGATIVE
CASTS UA: ABNORMAL /LPF
CATECHOL/URINE INTERP: ABNORMAL
COLOR: YELLOW
CREATININE URINE /24 HR: 1231 MG/D (ref 1000–2500)
CREATININE URINE /VOLUME: 57 MG/DL
DOPAMINE 24 HOUR URINE: 56 UG/D (ref 71–485)
DOPAMINE, (UG/L): 26 UG/L
DOPAMINE, UR/G CRT: 46 UG/G CRT (ref 0–250)
EOSINOPHILS RELATIVE PERCENT: 5 % (ref 0–4)
EPINEPHRINE 24 HOUR URINE: <2 UG/D (ref 1–14)
EPINEPHRINE, (UG/L): <1 UG/L
EPINEPHRINE, UR/G CRT: <2 UG/G CRT (ref 0–20)
EPITHELIAL CELLS UA: ABNORMAL /HPF
GLUCOSE URINE: NEGATIVE
HCT VFR BLD CALC: 40.3 % (ref 41–53)
HEMOGLOBIN: 14.4 G/DL (ref 13.5–17.5)
HOURS COLLECTED: 24
KETONES, URINE: NEGATIVE
LEUKOCYTE ESTERASE, URINE: NEGATIVE
LYMPHOCYTES # BLD: 24 % (ref 24–44)
MCH RBC QN AUTO: 30.4 PG (ref 26–34)
MCHC RBC AUTO-ENTMCNC: 35.7 G/DL (ref 31–37)
MCV RBC AUTO: 85.3 FL (ref 80–100)
MONOCYTES # BLD: 10 % (ref 1–7)
NITRITE, URINE: NEGATIVE
NOREPINEPHRINE 24 HOUR URINE: 17 UG/D (ref 14–120)
NOREPINEPHRINE URINE MCG/GCR: 14 UG/G CRT (ref 0–45)
NOREPINEPHRINE, (UG/L): 8 UG/L
PDW BLD-RTO: 12.2 % (ref 11.5–14.9)
PH UA: 5.5 (ref 5–8)
PLATELET # BLD: 256 K/UL (ref 150–450)
PMV BLD AUTO: 6.3 FL (ref 6–12)
PROTEIN UA: ABNORMAL
RBC # BLD: 4.73 M/UL (ref 4.5–5.9)
RBC UA: ABNORMAL /HPF
SEG NEUTROPHILS: 60 % (ref 36–66)
SEGMENTED NEUTROPHILS ABSOLUTE COUNT: 3.9 K/UL (ref 1.3–9.1)
SPECIFIC GRAVITY UA: 1 (ref 1–1.03)
TURBIDITY: CLEAR
URINE HGB: ABNORMAL
URINE VOLUME: 2160
UROBILINOGEN, URINE: NORMAL
WBC # BLD: 6.3 K/UL (ref 3.5–11)
WBC UA: ABNORMAL /HPF

## 2022-08-18 PROCEDURE — 81001 URINALYSIS AUTO W/SCOPE: CPT

## 2022-08-18 PROCEDURE — 85025 COMPLETE CBC W/AUTO DIFF WBC: CPT

## 2022-08-18 PROCEDURE — 36415 COLL VENOUS BLD VENIPUNCTURE: CPT

## 2022-08-19 LAB
CREATININE URINE /24 HR: 1231 MG/D (ref 1000–2500)
CREATININE URINE /VOLUME: 57 MG/DL
HOURS COLLECTED: 24
METANEPHRINE UF INTERPRETATION: NORMAL
METANEPHRINE UG/G CRE: 51 UG/G CRT (ref 0–300)
METANEPHRINES 24 HOUR URINE: 63 UG/D (ref 55–320)
METANEPHRINES, URINE (UMOL/L): 29 UG/L
NORMETANEPHRINE, (G/CRT): 118 UG/G CRT (ref 0–400)
NORMETANEPHRINE, (NMOL/DAY): 145 UG/D (ref 81–667)
NORMETANEPHRINES, NMOL/L: 67 UG/L
URINE VOLUME: 2160

## 2022-08-23 ENCOUNTER — TELEPHONE (OUTPATIENT)
Dept: INTERVENTIONAL RADIOLOGY/VASCULAR | Age: 27
End: 2022-08-23

## 2022-09-01 ENCOUNTER — HOSPITAL ENCOUNTER (OUTPATIENT)
Dept: INTERVENTIONAL RADIOLOGY/VASCULAR | Age: 27
Discharge: HOME | End: 2022-09-03
Payer: COMMERCIAL

## 2022-09-01 VITALS
RESPIRATION RATE: 14 BRPM | SYSTOLIC BLOOD PRESSURE: 130 MMHG | DIASTOLIC BLOOD PRESSURE: 88 MMHG | HEART RATE: 69 BPM | OXYGEN SATURATION: 100 % | TEMPERATURE: 97.5 F

## 2022-09-01 DIAGNOSIS — R80.9 PROTEINURIA, UNSPECIFIED TYPE: ICD-10-CM

## 2022-09-01 DIAGNOSIS — N18.30 BENIGN HYPERTENSION WITH CHRONIC KIDNEY DISEASE, STAGE III (HCC): ICD-10-CM

## 2022-09-01 DIAGNOSIS — I12.9 BENIGN HYPERTENSION WITH CHRONIC KIDNEY DISEASE, STAGE III (HCC): ICD-10-CM

## 2022-09-01 DIAGNOSIS — N18.32 STAGE 3B CHRONIC KIDNEY DISEASE (HCC): ICD-10-CM

## 2022-09-01 DIAGNOSIS — R31.29 OTHER MICROSCOPIC HEMATURIA: ICD-10-CM

## 2022-09-01 LAB
INR BLD: 1
PARTIAL THROMBOPLASTIN TIME: 33.6 SEC (ref 24–36)
PLATELET # BLD: 213 K/UL (ref 150–450)
PROTHROMBIN TIME: 13.4 SEC (ref 11.8–14.6)

## 2022-09-01 PROCEDURE — 85049 AUTOMATED PLATELET COUNT: CPT

## 2022-09-01 PROCEDURE — 2580000003 HC RX 258: Performed by: RADIOLOGY

## 2022-09-01 PROCEDURE — 88300 SURGICAL PATH GROSS: CPT

## 2022-09-01 PROCEDURE — 6370000000 HC RX 637 (ALT 250 FOR IP): Performed by: RADIOLOGY

## 2022-09-01 PROCEDURE — 6360000002 HC RX W HCPCS: Performed by: RADIOLOGY

## 2022-09-01 PROCEDURE — 85610 PROTHROMBIN TIME: CPT

## 2022-09-01 PROCEDURE — 50200 RENAL BIOPSY PERQ: CPT

## 2022-09-01 PROCEDURE — 7100000010 HC PHASE II RECOVERY - FIRST 15 MIN

## 2022-09-01 PROCEDURE — 7100000011 HC PHASE II RECOVERY - ADDTL 15 MIN

## 2022-09-01 PROCEDURE — 85730 THROMBOPLASTIN TIME PARTIAL: CPT

## 2022-09-01 PROCEDURE — 76942 ECHO GUIDE FOR BIOPSY: CPT

## 2022-09-01 PROCEDURE — 2709999900 IR BIOPSY KIDNEY PERCUTANEOUS

## 2022-09-01 PROCEDURE — 36415 COLL VENOUS BLD VENIPUNCTURE: CPT

## 2022-09-01 RX ORDER — FENTANYL CITRATE 50 UG/ML
INJECTION, SOLUTION INTRAMUSCULAR; INTRAVENOUS
Status: COMPLETED | OUTPATIENT
Start: 2022-09-01 | End: 2022-09-01

## 2022-09-01 RX ORDER — SODIUM CHLORIDE 0.9 % (FLUSH) 0.9 %
5-40 SYRINGE (ML) INJECTION PRN
Status: DISCONTINUED | OUTPATIENT
Start: 2022-09-01 | End: 2022-09-04 | Stop reason: HOSPADM

## 2022-09-01 RX ORDER — SODIUM CHLORIDE 9 MG/ML
INJECTION, SOLUTION INTRAVENOUS PRN
Status: DISCONTINUED | OUTPATIENT
Start: 2022-09-01 | End: 2022-09-04 | Stop reason: HOSPADM

## 2022-09-01 RX ORDER — ACETAMINOPHEN 325 MG/1
650 TABLET ORAL EVERY 4 HOURS PRN
Status: DISCONTINUED | OUTPATIENT
Start: 2022-09-01 | End: 2022-09-04 | Stop reason: HOSPADM

## 2022-09-01 RX ORDER — ONDANSETRON 2 MG/ML
4 INJECTION INTRAMUSCULAR; INTRAVENOUS ONCE
Status: COMPLETED | OUTPATIENT
Start: 2022-09-01 | End: 2022-09-01

## 2022-09-01 RX ORDER — SODIUM CHLORIDE 0.9 % (FLUSH) 0.9 %
5-40 SYRINGE (ML) INJECTION EVERY 12 HOURS SCHEDULED
Status: DISCONTINUED | OUTPATIENT
Start: 2022-09-01 | End: 2022-09-04 | Stop reason: HOSPADM

## 2022-09-01 RX ADMIN — FENTANYL CITRATE 50 MCG: 50 INJECTION, SOLUTION INTRAMUSCULAR; INTRAVENOUS at 13:00

## 2022-09-01 RX ADMIN — ONDANSETRON 4 MG: 2 INJECTION INTRAMUSCULAR; INTRAVENOUS at 13:53

## 2022-09-01 RX ADMIN — ACETAMINOPHEN 650 MG: 325 TABLET ORAL at 15:52

## 2022-09-01 RX ADMIN — MICROFIBRILLAR COLLAGEN HEMOSTAT POWDER 1 G: POWDER at 13:48

## 2022-09-01 RX ADMIN — FENTANYL CITRATE 50 MCG: 50 INJECTION, SOLUTION INTRAMUSCULAR; INTRAVENOUS at 13:18

## 2022-09-01 RX ADMIN — SODIUM CHLORIDE: 9 INJECTION, SOLUTION INTRAVENOUS at 10:48

## 2022-09-01 ASSESSMENT — ENCOUNTER SYMPTOMS
WHEEZING: 0
VOMITING: 0
TROUBLE SWALLOWING: 0
ANAL BLEEDING: 0
DIARRHEA: 0
SORE THROAT: 0
BLOOD IN STOOL: 0
ABDOMINAL PAIN: 0
RHINORRHEA: 0
COUGH: 0
NAUSEA: 0
CONSTIPATION: 0
SHORTNESS OF BREATH: 0

## 2022-09-01 ASSESSMENT — PAIN DESCRIPTION - LOCATION: LOCATION: BACK

## 2022-09-01 ASSESSMENT — PAIN - FUNCTIONAL ASSESSMENT
PAIN_FUNCTIONAL_ASSESSMENT: NONE - DENIES PAIN
PAIN_FUNCTIONAL_ASSESSMENT: NONE - DENIES PAIN

## 2022-09-01 ASSESSMENT — PAIN SCALES - GENERAL
PAINLEVEL_OUTOF10: 4
PAINLEVEL_OUTOF10: 4

## 2022-09-01 ASSESSMENT — PAIN DESCRIPTION - ORIENTATION: ORIENTATION: LEFT

## 2022-09-01 ASSESSMENT — PAIN DESCRIPTION - DESCRIPTORS: DESCRIPTORS: ACHING

## 2022-09-01 NOTE — BRIEF OP NOTE
Brief Postoperative Note    Larry Bates  YOB: 1995  [id number]    Pre-operative Diagnosis: Potentially treatable kidney ds    Post-operative Diagnosis: Same    Procedure: Renal bx for fxn    Medications Given: fentanyl    Anesthesia: Local    Surgeons/Assistants: Devon Saldivar MD    Estimated Blood Loss: minimal    Complications: none    Specimens: were obtained    Findings: 6 20 g core biopsies lower pole left kidney obtained under US. Tract embolized with Avitene. Pt tolerated well. Specimen quality confirmed by pathology onsite.       Electronically signed by Devon Saldivar MD on 9/1/2022 at 2:08 PM

## 2022-09-01 NOTE — DISCHARGE INSTRUCTIONS
RADIOLOGY POST BIOPSY INSTRUCTIONS    If you had IV Sedation:  No alcoholic beverages, no driving, operating machinery, or making legal or important decisions for 24 hours. Diet:    May resume your usual diet. Medications:  Use over the counter pain medications as directed on the bottle for pain control. You may continue your home medications as instructed by the radiologist.    Post-Procedure Activity:    Avoid exercises that use your belly muscles and strenuous activities, such as bicycle riding, jogging, weight lifting, or aerobic exercise, for 1 week or until your doctor says it is okay. Avoid other heavy work or sports for 2 days. Limit activities for 24 hours. Resume normal diet. You will probably be able to shower the same day as the test, if your doctor says it is okay. Pat the puncture site dry. Do not take a bath for at least 2 days after the test, or until your doctor tells you it is okay. Puncture Site:  Keep clean and dry for 24-48 hours. Call Doctor if  Swelling occurs around puncture site. Bleeding occurs at puncture site. Shortness of breath occurs. Extreme pain occurs. Unable to urinate     IF YOU CANNOT REACH THE DOCTOR, GO TO THE NEAREST EMERGENCY FACILITY.      Phone:  Interventional Radiology  [phone] Dr. Cesar Win  After hours Radiology  [phone]

## 2022-09-01 NOTE — H&P
HISTORY and [address] [facility] [id number]       NAME:  Antoinette Mark  MRN: [id number]   YOB: 1995   Date: 9/1/2022   Age: 32 y.o. Gender: male     COMPLAINT AND PRESENT HISTORY:   Antoinette Mark is 32 y.o.,  male, undergoing IR BIOPSY KIDNEY PERCUTANEOUS for Dx: Benign hypertension with chronic kidney disease, stage III ([facility] [address] [I12.9, N18.30 (ICD-10-CM)]; Stage 3b chronic kidney disease ([facility] [address] [C88.65 (ICD-10-CM)]; Proteinuria, unspecified type [R80.9 (ICD-10-CM)]; Other microscopic hematuria [R31.29 (ICD-10-CM)] per Dr. Evelia Estrella. HPI:  SEE PORTION OF NOTE BELOW PER DR. PARK, 8-19-22 (REVIEWED):  Nephrological associated problem list:  Hypertension  CKD stage 3 B  Proteinuria  Microscopic hematuria  chronic headaches     This is a 32 y.o. male employee at [employer] [facility] [employer] with history of hypertension  who is referred for evaluation and management of CKD stage 3 associated with proteinuria and microscopic hematuria. Patient reports being diagnosed with hypertension in 2018 and was started on 2 blood pressure medications at that time[ losartan and amlodipine]. He has a history of chronic migraine headaches and has chronically been on Excedrin for several years. He explains that no other medications relieve his headaches. He has not been evaluated by a neurologist  for chronic headaches. He has a family history of hypertension in his mother and mild hypertension in a brother. Patient has had progressive decline in renal function. He had a creatinine ranging between 1.1 to 1.3 mg in 2018 , His creatinine was 1.83 mg/dl 7/24/2020. Most recently his  creatinine was 2.01 mg/dl with BUN of 25 on 5/25/2022 and recently 2.37 mg/dl with BUN of 28 on 6/30/2022 with a GFR of 33 mls/min. Patient denies leg swelling, passage of foamy urine, gross hematuria or flank pain.   He denies any skin rash hemoptysis chronic joint pain sore throat or recurrent UTIs or sinus problems- his total creatinine 1255. Renal ultrasound Right kidney 10.77 showing right kidney 10.7 cm in length and left kidney 11.9 cm in length. Increased bilateral renal echogenicity consistent with medical renal disease no hydronephrosis or nephrolithiasis. Renal artery duplex showed no evidence of renal artery stenosis. Assessment  Hypertensive kidney disease associated with chronic kidney disease stage IIIb -given proteinuria and hematuria,  chronic glomerular disease is a differential. Chronic interstitial nephritis from chronic PPI use and Excedrin use is a differential -24 hour urine protein showed only 432 mg of protein-current creatinine 2.3 mg/dL. Negative serological studies except equivoal LO with + anti DNA     CKD stage 3B eGFR 32 mls/min     Hypertension with clinically no evidence of edema-improving-2nd hypertension work up unremarkable with no evidence of renal artery stenosis on renal artery duplex     Proteinuria 2 + and microscopic hematuria - rbc on UA- 432 mg of protein     Chronic headaches likely 2nd to tension headaches or migraine headache or also could be a manifestation of uncontrolled blood pressure-improving with better BP control and labetalol     Plan  Increase labetalol to 200 mg bid   continue losartan and amlodipine at current dose. 2 gm sodium diet   home blood pressure  logs  Avoid NSAIDS, Fleet laxatives and IV contrast  Decrease pepcid to 40 mg EOD   May consider decreasing losartan if creatinine remains elevated  A kidney biopsy is required to determine the diagnosis , indicate the cause,and prognosis of the injury and direct treatment. The benefits, risks, potential complications of kidney biopsy have been explained.   RECENT IMAGING R/T HPI     Narrative   EXAMINATION:   RETROPERITONEAL ULTRASOUND OF THE KIDNEYS AND URINARY BLADDER       7/23/2022       COMPARISON:   None       HISTORY:   ORDERING SYSTEM PROVIDED HISTORY: Stage 3b chronic kidney disease ([address] Alcohol History       Alcohol Use Status  Yes Comment  socially- not weekly, states monthly              Drug Use       Drug Use Status  No              Sexual Activity       Sexually Active  Not Asked                  REVIEW OF SYSTEMS    No Known Allergies    Current Outpatient Medications   Medication Sig Dispense Refill    labetalol (NORMODYNE) 200 MG tablet Take 1 tablet by mouth 2 times daily 180 tablet 3    FLOWFLEX COVID-19 AG HOME TEST KIT  (Patient not taking: Reported on 8/19/2022)      tiZANidine (ZANAFLEX) 2 MG tablet Take 1 tablet by mouth nightly as needed (muscle tightness) 30 tablet 2    famotidine (PEPCID) 40 MG tablet Take 1 tablet by mouth every evening 90 tablet 3    amitriptyline (ELAVIL) 50 MG tablet       losartan (COZAAR) 100 MG tablet Take 1 tablet by mouth daily 90 tablet 1    amLODIPine (NORVASC) 10 MG tablet Take 1 tablet by mouth daily 90 tablet 1    Cholecalciferol (VITAMIN D3) 1.25 MG (66121 UT) CAPS take 1 capsule by mouth every week 12 capsule 1    montelukast (SINGULAIR) 10 MG tablet Take 1 tablet by mouth daily 90 tablet 1    levocetirizine (XYZAL) 5 MG tablet Take 1 tablet by mouth every morning 90 tablet 1    atorvastatin (LIPITOR) 20 MG tablet Take 1 tablet by mouth daily 90 tablet 1    sucralfate (CARAFATE) 1 GM tablet Take 1 tablet by mouth 3 times daily 120 tablet 3    pantoprazole (PROTONIX) 40 MG tablet Take 1 tablet by mouth 2 times daily 180 tablet 3    Multiple Vitamins-Minerals (THERAPEUTIC MULTIVITAMIN-MINERALS) tablet Take 1 tablet by mouth daily      sertraline (ZOLOFT) 100 MG tablet Take 200 mg by mouth       Current Facility-Administered Medications   Medication Dose Route Frequency Provider Last Rate Last Admin    sodium chloride flush 0.9 % injection 5-40 mL  5-40 mL IntraVENous 2 times per day Jeanie Miller MD        sodium chloride flush 0.9 % injection 5-40 mL  5-40 mL IntraVENous PRN Jeanie Miller MD        0.9 % sodium chloride infusion IntraVENous PRN Sally Aparicio MD 20 mL/hr at 09/01/22 1048 New Bag at 09/01/22 1048      Review of Systems   Constitutional:  Negative for chills and fever. HENT:  Negative for congestion, ear pain, rhinorrhea, sore throat and trouble swallowing. Respiratory:  Negative for cough, shortness of breath and wheezing. Cardiovascular:  Negative for chest pain, palpitations and leg swelling. Gastrointestinal:  Negative for abdominal pain, anal bleeding, blood in stool, constipation, diarrhea, nausea and vomiting. Genitourinary:  Negative for dysuria and frequency. Skin:  Negative for rash and wound. Neurological:  Negative for dizziness and headaches. Hematological:  Does not bruise/bleed easily. GENERAL PHYSICAL EXAM     Vitals: /83   Pulse 74   Temp 96.9 °F (36.1 °C) (Infrared)   Resp 14   SpO2 98%     GENERAL APPEARANCE:   Radha Mathias is 32 y.o.,  male, not obese, nourished, conscious, alert. Does not appear to be in any distress or pain at this time. Physical Exam  Constitutional:       General: He is not in acute distress. Appearance: He is well-developed. He is not ill-appearing, toxic-appearing or diaphoretic. HENT:      Head: Normocephalic. Right Ear: External ear normal.      Left Ear: External ear normal.      Nose: Nose normal.      Mouth/Throat:      Pharynx: No oropharyngeal exudate or posterior oropharyngeal erythema. Tonsils: No tonsillar abscesses. Eyes:      General:         Right eye: No discharge. Left eye: No discharge. Cardiovascular:      Rate and Rhythm: Normal rate and regular rhythm. Pulses: Intact distal pulses. Heart sounds: Normal heart sounds. Pulmonary:      Effort: Pulmonary effort is normal. No accessory muscle usage or respiratory distress. Breath sounds: Normal breath sounds. No decreased breath sounds, wheezing, rhonchi or rales.    Abdominal:      General: Bowel sounds are normal. There is no distension. Palpations: Abdomen is soft. There is no mass. Tenderness: There is no abdominal tenderness. There is no right CVA tenderness, left CVA tenderness, guarding or rebound. Musculoskeletal:      Right lower leg: No swelling or tenderness. Left lower leg: No swelling or tenderness. Comments: Negative Julia's sign b/l. Skin:     General: Skin is warm and dry. Neurological:      Mental Status: He is alert and oriented to person, place, and time. Psychiatric:         Behavior: Behavior normal.       RECENT LAB WORK     Lab Results   Component Value Date     08/12/2022    K 4.2 08/12/2022    CL 99 08/12/2022    CO2 27 08/12/2022    BUN 22 (H) 08/12/2022    CREATININE 2.32 (H) 08/15/2022    GLUCOSE 87 08/12/2022    CALCIUM 9.6 08/12/2022    PROT 6.9 08/12/2022    LABALBU 4.4 08/12/2022    BILITOT 0.80 08/12/2022    ALKPHOS 92 08/12/2022    AST 21 08/12/2022    ALT 19 08/12/2022    LABGLOM 34 (L) 08/12/2022    GFRAA 42 (L) 08/12/2022     Lab Results   Component Value Date    WBC 6.3 08/18/2022    HGB 14.4 08/18/2022    HCT 40.3 (L) 08/18/2022    MCV 85.3 08/18/2022     09/01/2022     Lab Results   Component Value Date    APTT 33.6 09/01/2022     Lab Results   Component Value Date    INR 1.0 09/01/2022    PROTIME 13.4 09/01/2022     PROVISIONAL DIAGNOSES / PROCEDURE:      Dx: Benign hypertension with chronic kidney disease, stage III ([address] [I12.9, N18.30 (ICD-10-CM)]; Stage 3b chronic kidney disease ([address] [J95.52 (ICD-10-CM)]; Proteinuria, unspecified type [R80.9 (ICD-10-CM)];  Other microscopic hematuria [R31.29 (ICD-10-CM)]     IR BIOPSY KIDNEY PERCUTANEOUS     Patient Active Problem List    Diagnosis Date Noted    Stage 3a chronic kidney disease ([address] 06/15/2022    Generalized anxiety disorder 09/07/2017    Major depressive disorder, recurrent episode, moderate ([facility] 09/07/2017    Other hyperlipidemia 03/22/2018    Essential hypertension 03/08/2018 Electronically signed by OLIVE Moise CNP on 9/1/2022 at 11:03 AM

## 2022-09-01 NOTE — PROGRESS NOTES
HISTORY and [id number]       NAME:  Slava Shepard  MRN: [id number]   YOB: 1995   Date: 9/1/2022   Age: 32 y.o. Gender: male     COMPLAINT AND PRESENT HISTORY:   Slava Shepard is 32 y.o.,  male, undergoing IR BIOPSY KIDNEY PERCUTANEOUS for Dx: Benign hypertension with chronic kidney disease, stage III ([facility] [address] [I12.9, N18.30 (ICD-10-CM)]; Stage 3b chronic kidney disease ([facility] [address] [O67.20 (ICD-10-CM)]; Proteinuria, unspecified type [R80.9 (ICD-10-CM)]; Other microscopic hematuria [R31.29 (ICD-10-CM)] per Dr. Wing Vargas. HPI:  SEE PORTION OF NOTE BELOW PER DR. PARK, 8-19-22 (REVIEWED):  Nephrological associated problem list:  Hypertension  CKD stage 3 B  Proteinuria  Microscopic hematuria  chronic headaches     This is a 32 y.o. male employee at [employer] with history of hypertension  who is referred for evaluation and management of CKD stage 3 associated with proteinuria and microscopic hematuria. Patient reports being diagnosed with hypertension in 2018 and was started on 2 blood pressure medications at that time[ losartan and amlodipine]. He has a history of chronic migraine headaches and has chronically been on Excedrin for several years. He explains that no other medications relieve his headaches. He has not been evaluated by a neurologist  for chronic headaches. He has a family history of hypertension in his mother and mild hypertension in a brother. Patient has had progressive decline in renal function. He had a creatinine ranging between 1.1 to 1.3 mg in 2018 , His creatinine was 1.83 mg/dl 7/24/2020. Most recently his  creatinine was 2.01 mg/dl with BUN of 25 on 5/25/2022 and recently 2.37 mg/dl with BUN of 28 on 6/30/2022 with a GFR of 33 mls/min. Patient denies leg swelling, passage of foamy urine, gross hematuria or flank pain.   He denies any skin rash hemoptysis chronic joint pain sore throat or recurrent UTIs or sinus problems- his blood pressures at home are not optimally controlled and range in the 160/100s. He denies chest pain or shortness of breath. He reports mild palpitations but denies diaphoresis or lightheadness. He has lost about 12 pounds over the last 1 month through diet and exercise. He continues to take Excedrin for chronic headaches. He had used NSAIDs in the past.He is  compliant with blood pressure medications. He does have a history of GERD and had EGD in June 2021 showing small sliding hiatal hernia and distal esophageal changes suggestive of GERD. He has been taking high doses of PPI in the past and currently on famotidine 40 mg daily and sucralfate. He has no history of kidney stones,  recurrent UTIs no history of polycystic kidney disease or connective tissue disease. He denies history of smoking and only drinks alcohol occasionally  Urinalysis with microscopy  on 6/30/2022 showed large blood, 2+ proteinuria, 3-5 WBCs, 51 to 100 RBCs, 3-5 epithelial cells and few bacteria- nitrite and leukocyte esterase negative. Interval history-8/19/2022. His Headaches appear to be improving with improving BP control and effect of labetalol-he has only used Excedrin once over the last few weeks. Patient denies dysuria, gross hematuria, flank pain, nocturia, urgency, passing frothy urine or problems starting or stopping urine. No leg edema. Blood pressure better controlled today 140/80 on amlodipine 10 mg daily, labetalol 100 mg  2 times daily and Losartan 100 mg daily.   Urinalysis today shows 1+ protein and large urine hemoglobin        Labs:  C3-C4 normal Complement total CH 50 33-Mildly depressed  Anti GBM and ANCA are negative  LO equivocal antidouble-stranded DNA 14  Kappa lambda ratio-1.66 electrophoresis no monoclonal chain  Urine catecholamines ,urine metanephrines negative  Aldosterone 4.6   Renin activity 1.8  Plasma metanephrines within normal  24-hour urine protein 432 mg possibly under collected last 24-hour total creatinine 1255. Renal ultrasound Right kidney 10.77 showing right kidney 10.7 cm in length and left kidney 11.9 cm in length. Increased bilateral renal echogenicity consistent with medical renal disease no hydronephrosis or nephrolithiasis. Renal artery duplex showed no evidence of renal artery stenosis. Assessment  Hypertensive kidney disease associated with chronic kidney disease stage IIIb -given proteinuria and hematuria,  chronic glomerular disease is a differential. Chronic interstitial nephritis from chronic PPI use and Excedrin use is a differential -24 hour urine protein showed only 432 mg of protein-current creatinine 2.3 mg/dL. Negative serological studies except equivoal LO with + anti DNA     CKD stage 3B eGFR 32 mls/min     Hypertension with clinically no evidence of edema-improving-2nd hypertension work up unremarkable with no evidence of renal artery stenosis on renal artery duplex     Proteinuria 2 + and microscopic hematuria - rbc on UA- 432 mg of protein     Chronic headaches likely 2nd to tension headaches or migraine headache or also could be a manifestation of uncontrolled blood pressure-improving with better BP control and labetalol     Plan  Increase labetalol to 200 mg bid   continue losartan and amlodipine at current dose. 2 gm sodium diet   home blood pressure  logs  Avoid NSAIDS, Fleet laxatives and IV contrast  Decrease pepcid to 40 mg EOD   May consider decreasing losartan if creatinine remains elevated  A kidney biopsy is required to determine the diagnosis , indicate the cause,and prognosis of the injury and direct treatment. The benefits, risks, potential complications of kidney biopsy have been explained. RECENT IMAGING R/T HPI   No results found. Review of additional significant medical hx (see chart for additional detail, including current medications / see ROS for current s/s): HLD, HTN.   EKG, 3-19-18:  Narrative & Impression    Normal sinus rhythm  Nonspecific T wave abnormality  Abnormal ECG  No previous ECGs available      Specimen Collected: 03/19/18 14:11 EDT        NPO status: Patient states they have been NPO since before midnight. Medications taken TODAY (with sip of water): Norvasc, labetalol, losartan, Zoloft, Protonix, Xyzal, multivitamin. Anticoagulation status: Patient denies taking any anti-coagulants, including aspirin currently. Denies personal hx of blood clots. Denies personal hx of MRSA infection. Denies any personal or family hx of previous complications w/anesthesia. PAST MEDICAL HISTORY     Past Medical History:   Diagnosis Date    Abnormal EKG     Anxiety     History of chicken pox 2002    Hyperlipidemia     Hypertension     OCD (obsessive compulsive disorder)     Stage 3a chronic kidney disease ([facility] [address] 06/15/2022       SURGICAL HISTORY       Past Surgical History:   Procedure Laterality Date    CAPSULE ENDOSCOPY N/A 6/14/2021    ESOPHAGEAL CAPSULE ENDOSCOPY / FOURNIER performed by Bianca Galeana MD at [address] [place] [address] N/A 9/2/2020    EGD BIOPSY performed by Bianca Galeana MD at [place] [address] History     Socioeconomic History    Marital status: Single     Spouse name: None    Number of children: None    Years of education: None    Highest education level: None   Tobacco Use    Smoking status: Some Days     Types: Cigars    Smokeless tobacco: Never    Tobacco comments:     Occasional cigar with relatives (major holidays).    Vaping Use    Vaping Use: Never used   Substance and Sexual Activity    Alcohol use: Yes     Comment: socially- not weekly, states monthly    Drug use: No     Social Determinants of Health     Financial Resource Strain: Low Risk     Difficulty of Paying Living Expenses: Not hard at all   Food Insecurity: No Food Insecurity    Worried About Running Out of Food in the Last Year: Never true    Ran Out of Food in the Last Year: Never true       REVIEW OF SYSTEMS    No Known Allergies    Current Outpatient Medications on File Prior to Encounter   Medication Sig Dispense Refill    labetalol (NORMODYNE) 200 MG tablet Take 1 tablet by mouth 2 times daily 180 tablet 3    FLOWFLEX COVID-19 AG HOME TEST KIT  (Patient not taking: Reported on 8/19/2022)      tiZANidine (ZANAFLEX) 2 MG tablet Take 1 tablet by mouth nightly as needed (muscle tightness) 30 tablet 2    famotidine (PEPCID) 40 MG tablet Take 1 tablet by mouth every evening 90 tablet 3    amitriptyline (ELAVIL) 50 MG tablet       losartan (COZAAR) 100 MG tablet Take 1 tablet by mouth daily 90 tablet 1    amLODIPine (NORVASC) 10 MG tablet Take 1 tablet by mouth daily 90 tablet 1    Cholecalciferol (VITAMIN D3) 1.25 MG (22804 UT) CAPS take 1 capsule by mouth every week 12 capsule 1    montelukast (SINGULAIR) 10 MG tablet Take 1 tablet by mouth daily 90 tablet 1    levocetirizine (XYZAL) 5 MG tablet Take 1 tablet by mouth every morning 90 tablet 1    atorvastatin (LIPITOR) 20 MG tablet Take 1 tablet by mouth daily 90 tablet 1    sucralfate (CARAFATE) 1 GM tablet Take 1 tablet by mouth 3 times daily 120 tablet 3    pantoprazole (PROTONIX) 40 MG tablet Take 1 tablet by mouth 2 times daily 180 tablet 3    Multiple Vitamins-Minerals (THERAPEUTIC MULTIVITAMIN-MINERALS) tablet Take 1 tablet by mouth daily      sertraline (ZOLOFT) 100 MG tablet Take 200 mg by mouth       No current facility-administered medications on file prior to encounter. Review of Systems   Constitutional:  Negative for chills and fever. HENT:  Negative for congestion, ear pain, rhinorrhea, sore throat and trouble swallowing. Respiratory:  Negative for cough, shortness of breath and wheezing. Cardiovascular:  Negative for chest pain, palpitations and leg swelling. Gastrointestinal:  Negative for abdominal pain, anal bleeding, blood in stool, constipation, diarrhea, nausea and vomiting.    Genitourinary:  Negative for dysuria and frequency. Skin:  Negative for rash and wound. Neurological:  Negative for dizziness and headaches. Hematological:  Does not bruise/bleed easily. GENERAL PHYSICAL EXAM     Vitals: /83   Pulse 74   Temp 96.9 °F (36.1 °C) (Infrared)   Resp 14   SpO2 98%      GENERAL APPEARANCE:   Radha Mathias is 32 y.o.,  male, not obese, nourished, conscious, alert. Does not appear to be in any distress or pain at this time. Physical Exam  Constitutional:       General: He is not in acute distress. Appearance: He is well-developed. He is not ill-appearing, toxic-appearing or diaphoretic. HENT:      Head: Normocephalic. Right Ear: External ear normal.      Left Ear: External ear normal.      Nose: Nose normal.      Mouth/Throat:      Pharynx: No oropharyngeal exudate or posterior oropharyngeal erythema. Tonsils: No tonsillar abscesses. Eyes:      General:         Right eye: No discharge. Left eye: No discharge. Cardiovascular:      Rate and Rhythm: Normal rate and regular rhythm. Pulses: Intact distal pulses. Heart sounds: Normal heart sounds. Pulmonary:      Effort: Pulmonary effort is normal. No accessory muscle usage or respiratory distress. Breath sounds: Normal breath sounds. No decreased breath sounds, wheezing, rhonchi or rales. Abdominal:      General: Bowel sounds are normal. There is no distension. Palpations: Abdomen is soft. There is no mass. Tenderness: There is no abdominal tenderness. There is no right CVA tenderness, left CVA tenderness, guarding or rebound. Musculoskeletal:      Right lower leg: No swelling or tenderness. Left lower leg: No swelling or tenderness. Comments: Negative Julia's sign b/l. Skin:     General: Skin is warm and dry. Neurological:      Mental Status: He is alert and oriented to person, place, and time.    Psychiatric:         Behavior: Behavior normal.       RECENT LAB WORK Lab Results   Component Value Date     08/12/2022    K 4.2 08/12/2022    CL 99 08/12/2022    CO2 27 08/12/2022    BUN 22 (H) 08/12/2022    CREATININE 2.32 (H) 08/15/2022    GLUCOSE 87 08/12/2022    CALCIUM 9.6 08/12/2022    PROT 6.9 08/12/2022    LABALBU 4.4 08/12/2022    BILITOT 0.80 08/12/2022    ALKPHOS 92 08/12/2022    AST 21 08/12/2022    ALT 19 08/12/2022    LABGLOM 34 (L) 08/12/2022    GFRAA 42 (L) 08/12/2022     Lab Results   Component Value Date    WBC 6.3 08/18/2022    HGB 14.4 08/18/2022    HCT 40.3 (L) 08/18/2022    MCV 85.3 08/18/2022     08/18/2022     Platelet Count, PT/INR, APTT pending at the signing of this note. PROVISIONAL DIAGNOSES / PROCEDURE:      Dx: Benign hypertension with chronic kidney disease, stage III ([address] [I12.9, N18.30 (ICD-10-CM)]; Stage 3b chronic kidney disease ([address] [N32.72 (ICD-10-CM)]; Proteinuria, unspecified type [R80.9 (ICD-10-CM)];  Other microscopic hematuria [R31.29 (ICD-10-CM)]     IR BIOPSY KIDNEY PERCUTANEOUS     Patient Active Problem List    Diagnosis Date Noted    Stage 3a chronic kidney disease ([address] 06/15/2022    Generalized anxiety disorder 09/07/2017    Major depressive disorder, recurrent episode, moderate ([address] 09/07/2017    Other hyperlipidemia 03/22/2018    Essential hypertension 03/08/2018           OLIVE Velasquez CNP on 9/1/2022 at 10:11 AM

## 2022-09-02 ENCOUNTER — HOSPITAL ENCOUNTER (OUTPATIENT)
Age: 27
Setting detail: OBSERVATION
Discharge: HOME | End: 2022-09-04
Attending: EMERGENCY MEDICINE | Admitting: INTERNAL MEDICINE
Payer: COMMERCIAL

## 2022-09-02 ENCOUNTER — APPOINTMENT (OUTPATIENT)
Dept: CT IMAGING | Age: 27
End: 2022-09-02
Payer: COMMERCIAL

## 2022-09-02 DIAGNOSIS — G89.18 POST-OP PAIN: ICD-10-CM

## 2022-09-02 DIAGNOSIS — R10.9 LEFT FLANK PAIN: Primary | ICD-10-CM

## 2022-09-02 DIAGNOSIS — N13.39 OTHER HYDRONEPHROSIS: ICD-10-CM

## 2022-09-02 LAB
ABSOLUTE EOS #: 0.1 K/UL (ref 0–0.4)
ABSOLUTE LYMPH #: 0.9 K/UL (ref 1–4.8)
ABSOLUTE MONO #: 0.6 K/UL (ref 0.1–1.3)
ALBUMIN SERPL-MCNC: 4.5 G/DL (ref 3.5–5.2)
ALP BLD-CCNC: 86 U/L (ref 40–129)
ALT SERPL-CCNC: 15 U/L (ref 5–41)
ANION GAP SERPL CALCULATED.3IONS-SCNC: 13 MMOL/L (ref 9–17)
AST SERPL-CCNC: 20 U/L
BACTERIA: NORMAL
BASOPHILS # BLD: 0 % (ref 0–2)
BASOPHILS ABSOLUTE: 0 K/UL (ref 0–0.2)
BILIRUB SERPL-MCNC: 0.7 MG/DL (ref 0.3–1.2)
BILIRUBIN URINE: NEGATIVE
BUN BLDV-MCNC: 27 MG/DL (ref 6–20)
CALCIUM SERPL-MCNC: 10.1 MG/DL (ref 8.6–10.4)
CHLORIDE BLD-SCNC: 102 MMOL/L (ref 98–107)
CO2: 23 MMOL/L (ref 20–31)
COLOR: YELLOW
CREAT SERPL-MCNC: 2.61 MG/DL (ref 0.7–1.2)
EOSINOPHILS RELATIVE PERCENT: 1 % (ref 0–4)
EPITHELIAL CELLS UA: NORMAL /HPF
GFR AFRICAN AMERICAN: 36 ML/MIN
GFR NON-AFRICAN AMERICAN: 30 ML/MIN
GFR SERPL CREATININE-BSD FRML MDRD: ABNORMAL ML/MIN/{1.73_M2}
GLUCOSE BLD-MCNC: 106 MG/DL (ref 70–99)
GLUCOSE URINE: NEGATIVE
HCT VFR BLD CALC: 37.6 % (ref 41–53)
HEMOGLOBIN: 13.2 G/DL (ref 13.5–17.5)
INR BLD: 1
KETONES, URINE: NEGATIVE
LEUKOCYTE ESTERASE, URINE: NEGATIVE
LYMPHOCYTES # BLD: 9 % (ref 24–44)
MCH RBC QN AUTO: 29.4 PG (ref 26–34)
MCHC RBC AUTO-ENTMCNC: 35.2 G/DL (ref 31–37)
MCV RBC AUTO: 83.7 FL (ref 80–100)
MONOCYTES # BLD: 6 % (ref 1–7)
NITRITE, URINE: NEGATIVE
PDW BLD-RTO: 12.1 % (ref 11.5–14.9)
PH UA: 6.5 (ref 5–8)
PLATELET # BLD: 231 K/UL (ref 150–450)
PMV BLD AUTO: 6.5 FL (ref 6–12)
POTASSIUM SERPL-SCNC: 3.9 MMOL/L (ref 3.7–5.3)
PROTEIN UA: ABNORMAL
PROTHROMBIN TIME: 12.6 SEC (ref 11.8–14.6)
RBC # BLD: 4.5 M/UL (ref 4.5–5.9)
RBC UA: NORMAL /HPF
SEG NEUTROPHILS: 84 % (ref 36–66)
SEGMENTED NEUTROPHILS ABSOLUTE COUNT: 8.5 K/UL (ref 1.3–9.1)
SODIUM BLD-SCNC: 138 MMOL/L (ref 135–144)
SPECIFIC GRAVITY UA: 1.01 (ref 1–1.03)
TOTAL PROTEIN: 7.1 G/DL (ref 6.4–8.3)
TURBIDITY: CLEAR
URINE HGB: ABNORMAL
UROBILINOGEN, URINE: NORMAL
WBC # BLD: 10.2 K/UL (ref 3.5–11)
WBC UA: NORMAL /HPF

## 2022-09-02 PROCEDURE — 6370000000 HC RX 637 (ALT 250 FOR IP): Performed by: INTERNAL MEDICINE

## 2022-09-02 PROCEDURE — 99285 EMERGENCY DEPT VISIT HI MDM: CPT

## 2022-09-02 PROCEDURE — 51798 US URINE CAPACITY MEASURE: CPT

## 2022-09-02 PROCEDURE — 6370000000 HC RX 637 (ALT 250 FOR IP): Performed by: STUDENT IN AN ORGANIZED HEALTH CARE EDUCATION/TRAINING PROGRAM

## 2022-09-02 PROCEDURE — 6370000000 HC RX 637 (ALT 250 FOR IP)

## 2022-09-02 PROCEDURE — 6360000002 HC RX W HCPCS: Performed by: EMERGENCY MEDICINE

## 2022-09-02 PROCEDURE — 6360000002 HC RX W HCPCS

## 2022-09-02 PROCEDURE — 99225 PR SBSQ OBSERVATION CARE/DAY 25 MINUTES: CPT | Performed by: INTERNAL MEDICINE

## 2022-09-02 PROCEDURE — 6370000000 HC RX 637 (ALT 250 FOR IP): Performed by: UROLOGY

## 2022-09-02 PROCEDURE — 2580000003 HC RX 258: Performed by: EMERGENCY MEDICINE

## 2022-09-02 PROCEDURE — 80053 COMPREHEN METABOLIC PANEL: CPT

## 2022-09-02 PROCEDURE — 96376 TX/PRO/DX INJ SAME DRUG ADON: CPT

## 2022-09-02 PROCEDURE — 85610 PROTHROMBIN TIME: CPT

## 2022-09-02 PROCEDURE — 74176 CT ABD & PELVIS W/O CONTRAST: CPT

## 2022-09-02 PROCEDURE — 36415 COLL VENOUS BLD VENIPUNCTURE: CPT

## 2022-09-02 PROCEDURE — 96361 HYDRATE IV INFUSION ADD-ON: CPT

## 2022-09-02 PROCEDURE — 96374 THER/PROPH/DIAG INJ IV PUSH: CPT

## 2022-09-02 PROCEDURE — 96372 THER/PROPH/DIAG INJ SC/IM: CPT

## 2022-09-02 PROCEDURE — 81001 URINALYSIS AUTO W/SCOPE: CPT

## 2022-09-02 PROCEDURE — 2580000003 HC RX 258: Performed by: INTERNAL MEDICINE

## 2022-09-02 PROCEDURE — 85025 COMPLETE CBC W/AUTO DIFF WBC: CPT

## 2022-09-02 PROCEDURE — 87086 URINE CULTURE/COLONY COUNT: CPT

## 2022-09-02 PROCEDURE — 96375 TX/PRO/DX INJ NEW DRUG ADDON: CPT

## 2022-09-02 PROCEDURE — G0378 HOSPITAL OBSERVATION PER HR: HCPCS

## 2022-09-02 PROCEDURE — 6360000002 HC RX W HCPCS: Performed by: INTERNAL MEDICINE

## 2022-09-02 RX ORDER — PANTOPRAZOLE SODIUM 40 MG/1
40 TABLET, DELAYED RELEASE ORAL 2 TIMES DAILY
Status: DISCONTINUED | OUTPATIENT
Start: 2022-09-02 | End: 2022-09-04 | Stop reason: HOSPADM

## 2022-09-02 RX ORDER — MORPHINE SULFATE 4 MG/ML
4 INJECTION, SOLUTION INTRAMUSCULAR; INTRAVENOUS EVERY 4 HOURS PRN
Status: DISCONTINUED | OUTPATIENT
Start: 2022-09-02 | End: 2022-09-02

## 2022-09-02 RX ORDER — ACETAMINOPHEN 325 MG/1
650 TABLET ORAL EVERY 6 HOURS PRN
Status: DISCONTINUED | OUTPATIENT
Start: 2022-09-02 | End: 2022-09-04 | Stop reason: HOSPADM

## 2022-09-02 RX ORDER — ACETAMINOPHEN 650 MG/1
650 SUPPOSITORY RECTAL EVERY 6 HOURS PRN
Status: DISCONTINUED | OUTPATIENT
Start: 2022-09-02 | End: 2022-09-04 | Stop reason: HOSPADM

## 2022-09-02 RX ORDER — ONDANSETRON 2 MG/ML
4 INJECTION INTRAMUSCULAR; INTRAVENOUS EVERY 6 HOURS PRN
Status: DISCONTINUED | OUTPATIENT
Start: 2022-09-02 | End: 2022-09-02

## 2022-09-02 RX ORDER — MONTELUKAST SODIUM 10 MG/1
10 TABLET ORAL DAILY
Status: DISCONTINUED | OUTPATIENT
Start: 2022-09-02 | End: 2022-09-04 | Stop reason: HOSPADM

## 2022-09-02 RX ORDER — ERGOCALCIFEROL 1.25 MG/1
50000 CAPSULE ORAL WEEKLY
Status: DISCONTINUED | OUTPATIENT
Start: 2022-09-06 | End: 2022-09-04 | Stop reason: HOSPADM

## 2022-09-02 RX ORDER — AMLODIPINE BESYLATE 10 MG/1
10 TABLET ORAL DAILY
Status: DISCONTINUED | OUTPATIENT
Start: 2022-09-03 | End: 2022-09-04 | Stop reason: HOSPADM

## 2022-09-02 RX ORDER — MAGNESIUM SULFATE 1 G/100ML
1000 INJECTION INTRAVENOUS PRN
Status: DISCONTINUED | OUTPATIENT
Start: 2022-09-02 | End: 2022-09-04 | Stop reason: HOSPADM

## 2022-09-02 RX ORDER — TAMSULOSIN HYDROCHLORIDE 0.4 MG/1
0.8 CAPSULE ORAL DAILY
Status: DISCONTINUED | OUTPATIENT
Start: 2022-09-02 | End: 2022-09-04 | Stop reason: HOSPADM

## 2022-09-02 RX ORDER — POLYETHYLENE GLYCOL 3350 17 G/17G
17 POWDER, FOR SOLUTION ORAL DAILY PRN
Status: DISCONTINUED | OUTPATIENT
Start: 2022-09-02 | End: 2022-09-04 | Stop reason: HOSPADM

## 2022-09-02 RX ORDER — SODIUM CHLORIDE 0.9 % (FLUSH) 0.9 %
5-40 SYRINGE (ML) INJECTION EVERY 12 HOURS SCHEDULED
Status: DISCONTINUED | OUTPATIENT
Start: 2022-09-02 | End: 2022-09-04 | Stop reason: HOSPADM

## 2022-09-02 RX ORDER — 0.9 % SODIUM CHLORIDE 0.9 %
1000 INTRAVENOUS SOLUTION INTRAVENOUS ONCE
Status: COMPLETED | OUTPATIENT
Start: 2022-09-02 | End: 2022-09-02

## 2022-09-02 RX ORDER — CETIRIZINE HYDROCHLORIDE 10 MG/1
5 TABLET ORAL DAILY
Refills: 1 | Status: DISCONTINUED | OUTPATIENT
Start: 2022-09-02 | End: 2022-09-04 | Stop reason: HOSPADM

## 2022-09-02 RX ORDER — LABETALOL 100 MG/1
200 TABLET, FILM COATED ORAL 2 TIMES DAILY
Status: DISCONTINUED | OUTPATIENT
Start: 2022-09-02 | End: 2022-09-04 | Stop reason: HOSPADM

## 2022-09-02 RX ORDER — SERTRALINE HYDROCHLORIDE 100 MG/1
200 TABLET, FILM COATED ORAL DAILY
Status: DISCONTINUED | OUTPATIENT
Start: 2022-09-02 | End: 2022-09-04 | Stop reason: HOSPADM

## 2022-09-02 RX ORDER — POTASSIUM CHLORIDE 20 MEQ/1
40 TABLET, EXTENDED RELEASE ORAL PRN
Status: DISCONTINUED | OUTPATIENT
Start: 2022-09-02 | End: 2022-09-04 | Stop reason: HOSPADM

## 2022-09-02 RX ORDER — ENOXAPARIN SODIUM 100 MG/ML
40 INJECTION SUBCUTANEOUS DAILY
Status: DISCONTINUED | OUTPATIENT
Start: 2022-09-02 | End: 2022-09-03

## 2022-09-02 RX ORDER — INSULIN LISPRO 100 [IU]/ML
0-4 INJECTION, SOLUTION INTRAVENOUS; SUBCUTANEOUS NIGHTLY
Status: DISCONTINUED | OUTPATIENT
Start: 2022-09-02 | End: 2022-09-02

## 2022-09-02 RX ORDER — SERTRALINE HYDROCHLORIDE 100 MG/1
200 TABLET, FILM COATED ORAL DAILY
Status: DISCONTINUED | OUTPATIENT
Start: 2022-09-03 | End: 2022-09-02

## 2022-09-02 RX ORDER — SODIUM CHLORIDE 9 MG/ML
25 INJECTION, SOLUTION INTRAVENOUS PRN
Status: DISCONTINUED | OUTPATIENT
Start: 2022-09-02 | End: 2022-09-04 | Stop reason: HOSPADM

## 2022-09-02 RX ORDER — LANOLIN ALCOHOL/MO/W.PET/CERES
10 CREAM (GRAM) TOPICAL NIGHTLY
Status: DISCONTINUED | OUTPATIENT
Start: 2022-09-02 | End: 2022-09-04 | Stop reason: HOSPADM

## 2022-09-02 RX ORDER — INSULIN LISPRO 100 [IU]/ML
0-16 INJECTION, SOLUTION INTRAVENOUS; SUBCUTANEOUS
Status: DISCONTINUED | OUTPATIENT
Start: 2022-09-02 | End: 2022-09-02

## 2022-09-02 RX ORDER — ONDANSETRON 4 MG/1
4 TABLET, ORALLY DISINTEGRATING ORAL EVERY 8 HOURS PRN
Status: DISCONTINUED | OUTPATIENT
Start: 2022-09-02 | End: 2022-09-02

## 2022-09-02 RX ORDER — LOSARTAN POTASSIUM 50 MG/1
100 TABLET ORAL DAILY
Status: DISCONTINUED | OUTPATIENT
Start: 2022-09-03 | End: 2022-09-04 | Stop reason: HOSPADM

## 2022-09-02 RX ORDER — ONDANSETRON 4 MG/1
4 TABLET, ORALLY DISINTEGRATING ORAL EVERY 8 HOURS PRN
Status: DISCONTINUED | OUTPATIENT
Start: 2022-09-02 | End: 2022-09-04 | Stop reason: HOSPADM

## 2022-09-02 RX ORDER — DEXTROSE MONOHYDRATE 100 MG/ML
INJECTION, SOLUTION INTRAVENOUS CONTINUOUS PRN
Status: DISCONTINUED | OUTPATIENT
Start: 2022-09-02 | End: 2022-09-02

## 2022-09-02 RX ORDER — SODIUM CHLORIDE 0.9 % (FLUSH) 0.9 %
5-40 SYRINGE (ML) INJECTION PRN
Status: DISCONTINUED | OUTPATIENT
Start: 2022-09-02 | End: 2022-09-04 | Stop reason: HOSPADM

## 2022-09-02 RX ORDER — TAMSULOSIN HYDROCHLORIDE 0.4 MG/1
0.4 CAPSULE ORAL DAILY
Status: DISCONTINUED | OUTPATIENT
Start: 2022-09-02 | End: 2022-09-02

## 2022-09-02 RX ORDER — TAMSULOSIN HYDROCHLORIDE 0.4 MG/1
0.8 CAPSULE ORAL DAILY
Status: DISCONTINUED | OUTPATIENT
Start: 2022-09-03 | End: 2022-09-02

## 2022-09-02 RX ORDER — SODIUM CHLORIDE 9 MG/ML
INJECTION, SOLUTION INTRAVENOUS CONTINUOUS
Status: DISCONTINUED | OUTPATIENT
Start: 2022-09-02 | End: 2022-09-04 | Stop reason: HOSPADM

## 2022-09-02 RX ORDER — ONDANSETRON 2 MG/ML
8 INJECTION INTRAMUSCULAR; INTRAVENOUS ONCE
Status: COMPLETED | OUTPATIENT
Start: 2022-09-02 | End: 2022-09-02

## 2022-09-02 RX ORDER — ONDANSETRON 2 MG/ML
4 INJECTION INTRAMUSCULAR; INTRAVENOUS EVERY 6 HOURS PRN
Status: DISCONTINUED | OUTPATIENT
Start: 2022-09-02 | End: 2022-09-04 | Stop reason: HOSPADM

## 2022-09-02 RX ORDER — AMITRIPTYLINE HYDROCHLORIDE 50 MG/1
50 TABLET, FILM COATED ORAL NIGHTLY
Status: DISCONTINUED | OUTPATIENT
Start: 2022-09-03 | End: 2022-09-04 | Stop reason: HOSPADM

## 2022-09-02 RX ORDER — ATORVASTATIN CALCIUM 20 MG/1
20 TABLET, FILM COATED ORAL NIGHTLY
Status: DISCONTINUED | OUTPATIENT
Start: 2022-09-02 | End: 2022-09-04 | Stop reason: HOSPADM

## 2022-09-02 RX ORDER — POTASSIUM CHLORIDE 7.45 MG/ML
10 INJECTION INTRAVENOUS PRN
Status: DISCONTINUED | OUTPATIENT
Start: 2022-09-02 | End: 2022-09-04 | Stop reason: HOSPADM

## 2022-09-02 RX ORDER — INSULIN GLARGINE 100 [IU]/ML
25 INJECTION, SOLUTION SUBCUTANEOUS NIGHTLY
Status: DISCONTINUED | OUTPATIENT
Start: 2022-09-02 | End: 2022-09-02

## 2022-09-02 RX ADMIN — HYDROMORPHONE HYDROCHLORIDE 1 MG: 1 INJECTION, SOLUTION INTRAMUSCULAR; INTRAVENOUS; SUBCUTANEOUS at 06:45

## 2022-09-02 RX ADMIN — Medication 10.5 MG: at 20:07

## 2022-09-02 RX ADMIN — SODIUM CHLORIDE, PRESERVATIVE FREE 10 ML: 5 INJECTION INTRAVENOUS at 09:48

## 2022-09-02 RX ADMIN — ONDANSETRON 8 MG: 2 INJECTION, SOLUTION INTRAMUSCULAR; INTRAVENOUS at 06:52

## 2022-09-02 RX ADMIN — ONDANSETRON 4 MG: 2 INJECTION INTRAMUSCULAR; INTRAVENOUS at 20:07

## 2022-09-02 RX ADMIN — SODIUM CHLORIDE: 9 INJECTION, SOLUTION INTRAVENOUS at 22:22

## 2022-09-02 RX ADMIN — LABETALOL HYDROCHLORIDE 200 MG: 100 TABLET, FILM COATED ORAL at 20:08

## 2022-09-02 RX ADMIN — CETIRIZINE HYDROCHLORIDE 5 MG: 10 TABLET, FILM COATED ORAL at 16:00

## 2022-09-02 RX ADMIN — ATORVASTATIN CALCIUM 20 MG: 20 TABLET, FILM COATED ORAL at 20:08

## 2022-09-02 RX ADMIN — HYDROMORPHONE HYDROCHLORIDE 1 MG: 1 INJECTION, SOLUTION INTRAMUSCULAR; INTRAVENOUS; SUBCUTANEOUS at 08:00

## 2022-09-02 RX ADMIN — SODIUM CHLORIDE: 9 INJECTION, SOLUTION INTRAVENOUS at 13:11

## 2022-09-02 RX ADMIN — SODIUM CHLORIDE, PRESERVATIVE FREE 10 ML: 5 INJECTION INTRAVENOUS at 20:08

## 2022-09-02 RX ADMIN — HYDROMORPHONE HYDROCHLORIDE 1 MG: 1 INJECTION, SOLUTION INTRAMUSCULAR; INTRAVENOUS; SUBCUTANEOUS at 16:59

## 2022-09-02 RX ADMIN — TAMSULOSIN HYDROCHLORIDE 0.8 MG: 0.4 CAPSULE ORAL at 10:44

## 2022-09-02 RX ADMIN — MORPHINE SULFATE 4 MG: 4 INJECTION, SOLUTION INTRAMUSCULAR; INTRAVENOUS at 12:41

## 2022-09-02 RX ADMIN — PANTOPRAZOLE SODIUM 40 MG: 40 TABLET, DELAYED RELEASE ORAL at 20:07

## 2022-09-02 RX ADMIN — SERTRALINE HYDROCHLORIDE 200 MG: 100 TABLET ORAL at 16:00

## 2022-09-02 RX ADMIN — MONTELUKAST 10 MG: 10 TABLET, FILM COATED ORAL at 16:00

## 2022-09-02 RX ADMIN — HYDROMORPHONE HYDROCHLORIDE 1 MG: 1 INJECTION, SOLUTION INTRAMUSCULAR; INTRAVENOUS; SUBCUTANEOUS at 21:22

## 2022-09-02 RX ADMIN — ONDANSETRON 4 MG: 4 TABLET, ORALLY DISINTEGRATING ORAL at 12:41

## 2022-09-02 RX ADMIN — ENOXAPARIN SODIUM 40 MG: 100 INJECTION SUBCUTANEOUS at 10:44

## 2022-09-02 RX ADMIN — SODIUM CHLORIDE 1000 ML: 9 INJECTION, SOLUTION INTRAVENOUS at 10:45

## 2022-09-02 RX ADMIN — SODIUM CHLORIDE 1000 ML: 9 INJECTION, SOLUTION INTRAVENOUS at 06:51

## 2022-09-02 ASSESSMENT — ENCOUNTER SYMPTOMS
DIARRHEA: 0
ABDOMINAL PAIN: 1
ABDOMINAL PAIN: 0
VOMITING: 0
NAUSEA: 1
CONSTIPATION: 0
EYES NEGATIVE: 1
RESPIRATORY NEGATIVE: 1
BACK PAIN: 0

## 2022-09-02 ASSESSMENT — PAIN DESCRIPTION - LOCATION
LOCATION: FLANK

## 2022-09-02 ASSESSMENT — PAIN DESCRIPTION - ORIENTATION
ORIENTATION: LEFT
ORIENTATION: LEFT

## 2022-09-02 ASSESSMENT — PAIN SCALES - GENERAL
PAINLEVEL_OUTOF10: 7
PAINLEVEL_OUTOF10: 9
PAINLEVEL_OUTOF10: 6
PAINLEVEL_OUTOF10: 10
PAINLEVEL_OUTOF10: 6
PAINLEVEL_OUTOF10: 8
PAINLEVEL_OUTOF10: 8

## 2022-09-02 ASSESSMENT — PAIN DESCRIPTION - DESCRIPTORS
DESCRIPTORS: SORE;DISCOMFORT;TENDER
DESCRIPTORS: SHOOTING

## 2022-09-02 ASSESSMENT — LIFESTYLE VARIABLES: HOW OFTEN DO YOU HAVE A DRINK CONTAINING ALCOHOL: NEVER

## 2022-09-02 ASSESSMENT — PAIN DESCRIPTION - FREQUENCY: FREQUENCY: CONTINUOUS

## 2022-09-02 ASSESSMENT — PAIN - FUNCTIONAL ASSESSMENT: PAIN_FUNCTIONAL_ASSESSMENT: 0-10

## 2022-09-02 ASSESSMENT — PAIN DESCRIPTION - PAIN TYPE: TYPE: ACUTE PAIN

## 2022-09-02 NOTE — PROGRESS NOTES
Physical Therapy        Physical Therapy Cancel Note      DATE: 2022    NAME: Eveline Penny  MRN: [id number]   : 1995      Patient not seen this date for Physical Therapy due to:    Patient at baseline functional level with no acute PT needs. Will defer PT evaluation at this time. Please reorder PT if future needs arise.        Electronically signed by Elke Coornel PT on 2022 at 10:57 AM

## 2022-09-02 NOTE — PLAN OF CARE
Problem: Discharge Planning  Goal: Discharge to home or other facility with appropriate resources  Outcome: Progressing  plan for discharge gome  Problem: Pain  Goal: Verbalizes/displays adequate comfort level or baseline comfort level  Outcome: Progressing   Pt's pain is staying under control with medication  Problem: Safety - Adult  Goal: Free from fall injury  Outcome: Progressing   Pt remains free from fall  Problem: ABCDS Injury Assessment  Goal: Absence of physical injury  Outcome: Progressing   No injury this shift

## 2022-09-02 NOTE — PROGRESS NOTES
09/02/22 1709   Encounter Summary   Encounter Overview/Reason  Rituals, Rites and Sacraments   Service Provided For: Patient   Referral/Consult From: Rounding   Last Encounter  09/02/22   Complexity of Encounter Low   Spiritual/Emotional needs   Type Spiritual Support   Rituals, Rites and Sacraments   Type Anointing  (Carmel Jones 9/2/22)

## 2022-09-02 NOTE — PROGRESS NOTES
Dr Darling Puentes updated regarding pt urination no longer having clots for last few times. Pt pain has slightly decreased to 7/10. Plan to continue to watch and hope for no surgical intervention.

## 2022-09-02 NOTE — LETTER
[address] [facility] [address]  Phone: [phone]          September 4, 2022     Patient: Slava Shepard   YOB: 1995   Date of Visit: 9/2/2022       To Whom It May Concern: It is my medical opinion that Elena Daly may return to work on 09/11/2022. If you have any questions or concerns, please don't hesitate to call. Sincerely,        Maynor Aguilar.  Donnajean Spatz, MD

## 2022-09-02 NOTE — ED PROVIDER NOTES
ADDENDUM:        Care of this patient was assumed from Dr. Stuart Nolasco    at 0700     . The patient was seen for Flank Pain  . The patient's initial evaluation and plan have been discussed with the prior provider who initially evaluated the patient. Nursing Notes, Past Medical Hx, Past Surgical Hx, Social Hx, Allergies, and Family Hx were all reviewed. I performed a repeat evaluation of the patient and reviewed tests completed so far. ED Course   33 y/o Male with history of CKD presenting for left flank pain status post renal biopsy, patient was signed out to me pending completion of labs and CT scan    Labs reviewed creatinine is at 2.6 not significantly changed from his baseline, remaining other labs were unremarkable, CT was showing mild left hydro and hydroureter with hyperdensity in the distal left ureter likely hematoma versus clot but no other significant acute findings    Patient was reevaluated he is still reporting pain in the left flank, will give repeat dose of meds, will discuss with patient's nephrologist    Discussed with Dr. Heraclio Cain, from nephrology who is recommending admission as well as urology consult    Discussed with Dr. Ag Khan, from urology who is also recommending admission, IV fluids and a dose of Flomax and he will evaluate    Results were discussed with patient, discussed findings of concern for clot in the ureter, discussed plan for admission, patient agreeable    Spoke with Dr. Alex Kc who accepts admission with nephrology/urology consult. Patient demonstrates understanding and agreement with the plan, was given the opportunity to ask questions, and these questions were answered to the best of the provided information at this time. VS stable for transfer. This dictation was prepared using [employer] voice recognition software.        The patient was given the following medications:  Orders Placed This Encounter   Medications    0.9 % sodium chloride bolus    HYDROmorphone (DILAUDID) injection 1 mg    ondansetron (ZOFRAN) injection 8 mg    HYDROmorphone (DILAUDID) injection 1 mg    0.9 % sodium chloride bolus    0.9 % sodium chloride infusion    DISCONTD: tamsulosin (FLOMAX) capsule 0.4 mg    sodium chloride flush 0.9 % injection 5-40 mL    sodium chloride flush 0.9 % injection 5-40 mL    0.9 % sodium chloride infusion    enoxaparin (LOVENOX) injection 40 mg     Order Specific Question:   Indication of Use     Answer:   Prophylaxis-DVT/PE    OR Linked Order Group     ondansetron (ZOFRAN-ODT) disintegrating tablet 4 mg     ondansetron (ZOFRAN) injection 4 mg    polyethylene glycol (GLYCOLAX) packet 17 g    OR Linked Order Group     acetaminophen (TYLENOL) tablet 650 mg     acetaminophen (TYLENOL) suppository 650 mg    DISCONTD: tamsulosin (FLOMAX) capsule 0.8 mg    tamsulosin (FLOMAX) capsule 0.8 mg    DISCONTD: morphine sulfate (PF) injection 4 mg    magnesium sulfate 1000 mg in dextrose 5% 100 mL IVPB    OR Linked Order Group     potassium chloride (KLOR-CON M) extended release tablet 40 mEq     potassium bicarb-citric acid (EFFER-K) effervescent tablet 40 mEq     potassium chloride 10 mEq/100 mL IVPB (Peripheral Line)    DISCONTD: HYDROmorphone (DILAUDID) injection 0.5 mg    DISCONTD: HYDROmorphone (DILAUDID) injection 1 mg    amitriptyline (ELAVIL) tablet 50 mg    amLODIPine (NORVASC) tablet 10 mg    atorvastatin (LIPITOR) tablet 20 mg    labetalol (NORMODYNE) tablet 200 mg    losartan (COZAAR) tablet 100 mg    montelukast (SINGULAIR) tablet 10 mg    pantoprazole (PROTONIX) tablet 40 mg    sertraline (ZOLOFT) tablet 200 mg    OR Linked Order Group     HYDROmorphone (DILAUDID) injection 0.5 mg     HYDROmorphone (DILAUDID) injection 1 mg    melatonin tablet 10.5 mg    cetirizine (ZYRTEC) tablet 5 mg     Refill:  1    vitamin D (ERGOCALCIFEROL) capsule 50,000 Units       RECENT VITALS:  BP: (!) 140/82, Temp: 97.5 °F (36.4 °C), Heart Rate: 83, Resp: 15     RADIOLOGY:All plain film, CT, MRI, and formal ultrasound images (except ED bedside ultrasound) are read by the radiologist and the images and interpretations are directly viewed by the emergency physician. CT ABDOMEN PELVIS WO CONTRAST Additional Contrast? None   Final Result   1. Interval development of mild left hydronephrosis and hydroureter. Mild   hyperdensity in the distal left ureter just proximal to the UVJ likely   hematoma secondary to biopsy which may be coursing the mild obstructive signs. 2. Very minimal left perinephric fat stranding, some gas pockets within the   left renal collecting system and a small gas pocket posterior to the left   kidney compatible with post biopsy changes. 3. Asymmetric enlargement of left quadratus lumborum muscle with some   overlying fat stranding and minimal hyperdense traces of fluid compatible   with post biopsy hematoma. No significant amount of fluid or drainable fluid   collection. LABS: All lab results were reviewed by myself, and all abnormals are listed below. Labs Reviewed   CBC WITH AUTO DIFFERENTIAL - Abnormal; Notable for the following components:       Result Value    Hemoglobin 13.2 (*)     Hematocrit 37.6 (*)     Seg Neutrophils 84 (*)     Lymphocytes 9 (*)     Absolute Lymph # 0.90 (*)     All other components within normal limits   COMPREHENSIVE METABOLIC PANEL - Abnormal; Notable for the following components:    Glucose 106 (*)     BUN 27 (*)     Creatinine 2.61 (*)     GFR Non- 30 (*)     GFR  36 (*)     All other components within normal limits   URINALYSIS WITH REFLEX TO CULTURE - Abnormal; Notable for the following components:    Urine Hgb LARGE (*)     Protein, UA 2+ (*)     All other components within normal limits   CULTURE, URINE   PROTIME-INR   MICROSCOPIC URINALYSIS           Disposition   DISPOSITION:    DISPOSITION Admitted 09/02/2022 09:06:37 AM      CLINICAL IMPRESSION:  1. Left flank pain    2. Post-op pain    3.

## 2022-09-02 NOTE — CONSULTS
NEPHROLOGY CONSULT     Patient :  Flavio Jacobson; 32 y.o. MRN# [id number]  Location:  2051/2051-01  Attending:  Haile Blackwood MD  Admit Date:  9/2/2022   Hospital Day: 0      Reason for Consult:  CKD, left hydronephrosis, hematuria      Chief Complaint: Left-sided flank pain, hematuria status post kidney biopsy  History Obtained From:  patient    History of Present Illness: This is a 32 y.o. male with history of hypertension  who is referred for evaluation and management of CKD stage 3 associated with proteinuria and microscopic hematuria, history of hypertension, chronic kidney disease stage IIIb with baseline creatinine of 2.0 to 2.3 mg/dL patient has been evaluated and followed by Dr. Do Mack for hematuria and proteinuria. Patient had kidney biopsy yesterday, postprocedure patient started to have pain last night on the left flank and also had hematuria. Patient came to the ER had a CT abdomen pelvis which showed left hydronephrosis and suspecting a clot at the UVJ junction patient has had been admitted for further evaluation and management.   Labs showed serum creatinine slightly elevated 2.6 mg/dL patient denies any nausea vomiting diarrhea chest pain or shortness of breath          Past Medical History:        Diagnosis Date    Abnormal EKG     Anxiety     History of chicken pox 2002    Hyperlipidemia     Hypertension     OCD (obsessive compulsive disorder)     [facility]15/[facility]       Past Surgical History:        Procedure Laterality Date    CAPSULE ENDOSCOPY N/A 6/14/2021    ESOPHAGEAL CAPSULE ENDOSCOPY / FOURNIER performed by Nay Dudley MD at [address] [facility]  9/1/2022    IR BIOPSY KIDNEY PERCUTANEOUS 9/1/2022 [facility] SPECIAL PROCEDURES    UPPER GASTROINTESTINAL ENDOSCOPY N/A 9/2/2020    EGD BIOPSY performed by Nay Dudley MD at [address] [place] [address]       Current Medications:    0.9 % sodium chloride bolus, Once  0.9 % sodium chloride infusion, Continuous  sodium chloride flush 0.9 % injection 5-40 mL, 2 times per day  sodium chloride flush 0.9 % injection 5-40 mL, PRN  0.9 % sodium chloride infusion, PRN  enoxaparin (LOVENOX) injection 40 mg, Daily  ondansetron (ZOFRAN-ODT) disintegrating tablet 4 mg, Q8H PRN   Or  ondansetron (ZOFRAN) injection 4 mg, Q6H PRN  polyethylene glycol (GLYCOLAX) packet 17 g, Daily PRN  acetaminophen (TYLENOL) tablet 650 mg, Q6H PRN   Or  acetaminophen (TYLENOL) suppository 650 mg, Q6H PRN  tamsulosin (FLOMAX) capsule 0.8 mg, Daily    sodium chloride flush 0.9 % injection 5-40 mL, 2 times per day  sodium chloride flush 0.9 % injection 5-40 mL, PRN  0.9 % sodium chloride infusion, PRN  acetaminophen (TYLENOL) tablet 650 mg, Q4H PRN        Allergies:  Patient has no known allergies. Social History:   Social History     Socioeconomic History    Marital status: Single     Spouse name: Not on file    Number of children: Not on file    Years of education: Not on file    Highest education level: Not on file   Occupational History    Not on file   Tobacco Use    Smoking status: Some Days     Types: Cigars    Smokeless tobacco: Never    Tobacco comments:     Occasional cigar with relatives (major holidays).    Vaping Use    Vaping Use: Never used   Substance and Sexual Activity    Alcohol use: Yes     Comment: socially- not weekly, states monthly    Drug use: No    Sexual activity: Not on file   Other Topics Concern    Not on file   Social History Narrative    Not on file     Social Determinants of Health     Financial Resource Strain: Low Risk     Difficulty of Paying Living Expenses: Not hard at all   Food Insecurity: No Food Insecurity    Worried About Running Out of Food in the Last Year: Never true    Ran Out of Food in the Last Year: Never true   Transportation Needs: Not on file   Physical Activity: Not on file   Stress: Not on file   Social Connections: Not on file   Intimate Partner Violence: Not on file   Housing Stability: Not on file       Family History:   Family History   Problem Relation Age of Onset    Heart Disease Mother     Sleep Apnea Mother     Sleep Apnea Father        Review of Systems:    Constitutional: No fever, no chills, no night sweats, fatigue, generalized weakness, loss of appetite  HEENT:  No headache, otalgia, itchy eyes, epistaxis, nasal discharge or sore throat. Cardiac:  No chest pain, dyspnea, orthopnea or PND, palpitations  Chest:  No cough, hemoptysis, pleuritic chest pain, wheezing,SOB  Abdomen:  No abdominal pain, nausea, vomiting, diarrhea, melena, dysphagia hematemesis,constipation, abdominal bloating, flank pain  Neuro:  No CVA, TIA or seizure like activity. Skin:   No rashes, no itching. :   Complains of hematuria and left flank pain flank pain. Extremities:  No swelling or joint pains. Objective:  CURRENT TEMPERATURE:  Temp: 97.5 °F (36.4 °C)  MAXIMUM TEMPERATURE OVER 24HRS:  Temp (24hrs), Av.5 °F (36.4 °C), Min:97.1 °F (36.2 °C), Max:97.9 °F (36.6 °C)    CURRENT RESPIRATORY RATE:  Resp: 16  CURRENT PULSE:  Heart Rate: 83  CURRENT BLOOD PRESSURE:  BP: (!) 140/82  24HR BLOOD PRESSURE RANGE:  Systolic (79OND), BTY:782 , Min:114 , BWS:287   ; Diastolic (26BCN), SHIRA:23, Min:69, Max:99    24HR INTAKE/OUTPUT:  No intake or output data in the 24 hours ending 22 1144  Patient Vitals for the past 96 hrs (Last 3 readings):   Weight   22 0937 202 lb 13.2 oz (92 kg)   22 0602 205 lb (93 kg)       Physical Exam:  GENERAL APPEARANCE: Alert and cooperative, and appears to be in no acute distress. HEAD: normocephalic  EYES:  EOMI. Not pale, anicteric   NOSE:  No nasal discharge. THROAT:  Oral cavity and pharynx normal. Moist  CARDIAC: Normal S1 and S2. No S3, S4 or murmurs. Rhythm is regular.   LUNGS: Normal respiratory effort no accessory muscle use  NECK: Neck supple, non-tender   BACK: Examination of the spine reveals normal gait and posture, no spinal deformity, symmetry of spinal muscles, without tenderness, decreased range of motion or muscular spasm  MUSKULOSKELETAL: Adequately aligned spine. No joint erythema or tenderness. EXTREMITIES: No edema. Peripheral pulses intact. NEURO: Nonfocal      Labs:   CBC:  Recent Labs     09/01/22  1040 09/02/22  0653   WBC  --  10.2   RBC  --  4.50   HGB  --  13.2*   HCT  --  37.6*   MCV  --  83.7   MCH  --  29.4   MCHC  --  35.2   RDW  --  12.1    231   MPV  --  6.5      BMP:   Recent Labs     09/02/22  0653      K 3.9      CO2 23   BUN 27*   CREATININE 2.61*   GLUCOSE 106*   CALCIUM 10.1      Phosphorus:  No results for input(s): PHOS in the last 72 hours. Magnesium: No results for input(s): MG in the last 72 hours. Albumin:   Recent Labs     09/02/22  0653   LABALBU 4.5       IRON:  No results for input(s): IRON in the last 72 hours. Iron Saturation:  Invalid input(s): PERCENTFE  TIBC:  No results for input(s): TIBC in the last 72 hours. FERRITIN:  No results for input(s): FERRITIN in the last 72 hours. SPEP: No results for input(s): SPEP in the last 72 hours. Recent Labs     09/02/22  0653   PROT 7.1     UPEP: No results for input(s): TPU in the last 72 hours. Urine Sodium:  No results for input(s): KATIA in the last 72 hours. Urine Potassium: No results for input(s): KUR in the last 72 hours. Urine Chloride:  No results for input(s): CLU in the last 72 hours. Urine Ph:  Invalid input(s): PO4U  Urine Osmolarity: No results for input(s): OSMOU in the last 72 hours. Urine Creatinine:  No results for input(s): LABCREA in the last 72 hours. Urine Eosinophils: Invalid input(s): EOSU  Urine Protein:  No results for input(s): TPU in the last 72 hours.   Urinalysis:    Recent Labs     09/02/22  0727   NITRU NEGATIVE   COLORU Yellow   PHUR 6.5   WBCUA 0 TO 2   RBCUA 51    BACTERIA None   SPECGRAV 1.013   LEUKOCYTESUR NEGATIVE   UROBILINOGEN Normal   BILIRUBINUR NEGATIVE   GLUCOSEU NEGATIVE   KETUA NEGATIVE Radiology:  Reviewed as available. Assessment:  CKD stage III with baseline creatinine of 2.0 to 2.3 mg/dL, diagnosis unknown status post kidney biopsy yesterday for hematuria and proteinuria, some nephrotic proteinuria with 24-hour urine protein 400 mg, LO equivocal C3-C4 normal antidouble-stranded DNA slightly positive kappa lambda ratio slightly elevated 1.6 electrophoresis immunofixation, normal electrophoretic patterns, other differential diagnosis includes interstitial nephritis secondary to PPIs, biopsy results are pending  Essential hypertension  Left flank pain left hydronephrosis most likely secondary to clots secondary to kidney biopsy causing obstruction at the left UVJ junction          Plan:  Continue IV fluids normal saline at 100 mils per hour. Urology consultation requested for left hydronephrosis and clots post kidney biopsy. Continue blood pressure medications  Follow-up kidney biopsy      Thank you for the consultation.       Electronically signed by Holly Almeida MD on 9/2/2022 at 11:44 AM

## 2022-09-02 NOTE — ED TRIAGE NOTES
Mode of arrival (squad #, walk in, police, etc) : walk in        Chief complaint(s): lt flank pain        Arrival Note (brief scenario, treatment PTA, etc). : Pt states he had a lt kidney biopsy yesterday. He states he has been up all night in pain. Pt rates it a 10/10. Pt states he is urinating without any issues. C= \"Have you ever felt that you should Cut down on your drinking? \"  no  A= \"Have people Annoyed you by criticizing your drinking? \"  no  G= \"Have you ever felt bad or Guilty about your drinking? \"  no  E= \"Have you ever had a drink as an Eye-opener first thing in the morning to steady your nerves or to help a hangover? \"  no

## 2022-09-02 NOTE — PROGRESS NOTES
Dr Gurpreet Moreno notified of 9/10 ongoing left flank pain. New order placed for pain medication.      Residents notified he will be there patient per dr Gurpreet Moreno

## 2022-09-02 NOTE — PROGRESS NOTES
Dr Pacheco Ards notified new admission to floor. Reviewed pt will need pain medication on for his flank pain. Was relieved in ER following one time dilaudid dose but nothing ordered at this time for after.

## 2022-09-02 NOTE — PROGRESS NOTES
[address]   OCCUPATIONAL THERAPY MISSED TREATMENT NOTE   INPATIENT     Date: 22  Patient Name: Ruba Shen       Room: 8674/9896-92  MRN: [id number]   Account #: [de-identified]    : 1995  (32 y.o.)  Gender: male                 REASON FOR MISSED TREATMENT:    Patient positioned in bed at start of session. Pt performing all self care and functional mobility tasks IND at this time. Pt reports no further concerns at this time. Will defer OT evaluation at this time. Please reorder OT if future needs arise.      -    10:23-10:24        Xiomara Quijano S/OT

## 2022-09-02 NOTE — ED PROVIDER NOTES
EMERGENCY DEPARTMENT ENCOUNTER    Pt Name: Anat Calero  MRN: [id number]  [place] 1995  Date of evaluation: 22  CHIEF COMPLAINT       Chief Complaint   Patient presents with    Flank Pain     HISTORY OF PRESENT ILLNESS     Abdominal Pain  Pain location:  L flank  Pain quality: aching    Pain radiates to:  Does not radiate  Pain severity:  Severe  Onset quality:  Gradual  Duration:  6 hours  Timing:  Constant  Progression:  Unchanged  Chronicity:  New  Context comment:  Had left kidney biopsy done yesterday, doesn't have pain or nausea meds, felt pain all night  Relieved by:  Nothing  Worsened by:  Nothing  Ineffective treatments: a single norco, he had once left over and it was . Associated symptoms comment:  Had hematuria immediately post procedure that resolved, no fevers        REVIEW OF SYSTEMS     Review of Systems   Gastrointestinal:  Positive for abdominal pain. All other systems reviewed and are negative.   PASTMEDICAL HISTORY     Past Medical History:   Diagnosis Date    Abnormal EKG     Anxiety     History of chicken pox     Hyperlipidemia     Hypertension     OCD (obsessive compulsive disorder)     Stage 3a chronic kidney disease ([facility] [address] 06/15/2022     Past Problem List  Patient Active Problem List   Diagnosis Code    Essential hypertension I10    Other hyperlipidemia E78.49    Generalized anxiety disorder F41.1    Major depressive disorder, recurrent episode, moderate ([address] F33.1    Stage 3a chronic kidney disease ([address] N18.31     SURGICAL HISTORY       Past Surgical History:   Procedure Laterality Date    CAPSULE ENDOSCOPY N/A 2021    ESOPHAGEAL CAPSULE ENDOSCOPY / FOURNIER performed by Jacinda Marti MD at [facility]  2022    IR BIOPSY KIDNEY PERCUTANEOUS 2022 [facility]    UPPER GASTROINTESTINAL ENDOSCOPY N/A 2020    EGD BIOPSY performed by Jacinda Marti MD at [facility] Medications    AMITRIPTYLINE (ELAVIL) 50 MG TABLET        AMLODIPINE (NORVASC) 10 MG TABLET    Take 1 tablet by mouth daily    ATORVASTATIN (LIPITOR) 20 MG TABLET    Take 1 tablet by mouth daily    CHOLECALCIFEROL (VITAMIN D3) 1.25 MG (09156 UT) CAPS    take 1 capsule by mouth every week    FAMOTIDINE (PEPCID) 40 MG TABLET    Take 1 tablet by mouth every evening    FLOWFLEX COVID-19 AG HOME TEST KIT        LABETALOL (NORMODYNE) 200 MG TABLET    Take 1 tablet by mouth 2 times daily    LEVOCETIRIZINE (XYZAL) 5 MG TABLET    Take 1 tablet by mouth every morning    LOSARTAN (COZAAR) 100 MG TABLET    Take 1 tablet by mouth daily    MONTELUKAST (SINGULAIR) 10 MG TABLET    Take 1 tablet by mouth daily    MULTIPLE VITAMINS-MINERALS (THERAPEUTIC MULTIVITAMIN-MINERALS) TABLET    Take 1 tablet by mouth daily    PANTOPRAZOLE (PROTONIX) 40 MG TABLET    Take 1 tablet by mouth 2 times daily    SERTRALINE (ZOLOFT) 100 MG TABLET    Take 200 mg by mouth    SUCRALFATE (CARAFATE) 1 GM TABLET    Take 1 tablet by mouth 3 times daily    TIZANIDINE (ZANAFLEX) 2 MG TABLET    Take 1 tablet by mouth nightly as needed (muscle tightness)     ALLERGIES     has No Known Allergies. FAMILY HISTORY     He indicated that his mother is alive. He indicated that his father is alive. He indicated that his sister is alive. He indicated that his brother is alive. SOCIAL HISTORY       Social History     Tobacco Use    Smoking status: Some Days     Types: Cigars    Smokeless tobacco: Never    Tobacco comments:     Occasional cigar with relatives (major holidays).    Vaping Use    Vaping Use: Never used   Substance Use Topics    Alcohol use: Yes     Comment: socially- not weekly, states monthly    Drug use: No     PHYSICAL EXAM     INITIAL VITALS: BP (!) 143/99   Pulse 68   Temp 97.9 °F (36.6 °C) (Tympanic)   Resp 15   Ht 5' 11\" (1.803 m)   Wt 205 lb (93 kg)   SpO2 98%   BMI 28.59 kg/m²    Physical Exam  Constitutional:       General: He is not in acute distress. Appearance: Normal appearance. He is well-developed. He is not diaphoretic. HENT:      Head: Normocephalic and atraumatic. Right Ear: External ear normal.      Left Ear: External ear normal.      Nose: Nose normal. No congestion. Mouth/Throat:      Mouth: Mucous membranes are moist.      Pharynx: Oropharynx is clear. Eyes:      General:         Right eye: No discharge. Left eye: No discharge. Conjunctiva/sclera: Conjunctivae normal.      Pupils: Pupils are equal, round, and reactive to light. Neck:      Trachea: No tracheal deviation. Cardiovascular:      Rate and Rhythm: Normal rate and regular rhythm. Pulses: Normal pulses. Heart sounds: Normal heart sounds. Pulmonary:      Effort: Pulmonary effort is normal. No respiratory distress. Breath sounds: Normal breath sounds. No stridor. No wheezing or rales. Abdominal:      Palpations: Abdomen is soft. Tenderness: There is no abdominal tenderness. There is no guarding or rebound. Musculoskeletal:         General: No tenderness or deformity. Normal range of motion. Cervical back: Normal range of motion and neck supple. Comments: Left cva surgical wound dressing in place, some bruising present, no bleeding, no purulence, mild tenderness   Skin:     General: Skin is warm and dry. Capillary Refill: Capillary refill takes less than 2 seconds. Findings: No erythema or rash. Neurological:      General: No focal deficit present. Mental Status: He is alert and oriented to person, place, and time. Coordination: Coordination normal.   Psychiatric:         Mood and Affect: Mood normal.         Behavior: Behavior normal.         Thought Content:  Thought content normal.         Judgment: Judgment normal.       MEDICAL DECISION MAKIN:17 AM EDT  Ct and labs pending  Patient signed out to Dr Brooklyn Price     RADIOLOGY:All plain film, CT, MRI, and formal ultrasound images (except ED bedside ultrasound) are read by the radiologist, see reports below, unless otherwisenoted in MDM or here. CT ABDOMEN PELVIS WO CONTRAST Additional Contrast? None    (Results Pending)     LABS: All lab results were reviewed by myself, and all abnormals are listed below. Labs Reviewed   CBC WITH AUTO DIFFERENTIAL - Abnormal; Notable for the following components:       Result Value    Hemoglobin 13.2 (*)     Hematocrit 37.6 (*)     Seg Neutrophils 84 (*)     Lymphocytes 9 (*)     Absolute Lymph # 0.90 (*)     All other components within normal limits   COMPREHENSIVE METABOLIC PANEL - Abnormal; Notable for the following components:    Glucose 106 (*)     BUN 27 (*)     Creatinine 2.61 (*)     GFR Non- 30 (*)     GFR  36 (*)     All other components within normal limits   CULTURE, URINE   PROTIME-INR   URINALYSIS WITH REFLEX TO CULTURE       EMERGENCY DEPARTMENTCOURSE:         Vitals:    Vitals:    09/02/22 0602   BP: (!) 143/99   Pulse: 68   Resp: 15   Temp: 97.9 °F (36.6 °C)   TempSrc: Tympanic   SpO2: 98%   Weight: 205 lb (93 kg)   Height: 5' 11\" (1.803 m)       The patient was given the following medications while in the emergency department:  Orders Placed This Encounter   Medications    0.9 % sodium chloride bolus    HYDROmorphone (DILAUDID) injection 1 mg    ondansetron (ZOFRAN) injection 8 mg     CONSULTS:  None    FINAL IMPRESSION      1. Left flank pain          DISPOSITION/PLAN   DISPOSITION        PATIENT REFERRED TO:  No follow-up provider specified. DISCHARGE MEDICATIONS:  New Prescriptions    No medications on file     The care is provided during an unprecedented national emergency due to the novel coronavirus, COVID 19.   MD Marlon Cramer MD  09/02/22 9900

## 2022-09-02 NOTE — CARE COORDINATION
CASE MANAGEMENT NOTE:    Admission Date:  9/2/2022 Avani Cohen is a 32 y.o.  male    Admitted for : Post-op pain [G89.18]  Left flank pain [R10.9]  Other hydronephrosis [N13.39]    Met with:  Patient    PCP:  Dr Sindy Emanuel:  Francia Gutierrez      Is patient alert and oriented at time of discussion:  Yes    Current Residence/ Living Arrangements:  independently at home             Current Services PTA:  No    Does patient go to outpatient dialysis: No  If yes, location and chair time:   Who is their nephrologist?     Is patient agreeable to VNS: No    Freedom of choice provided:  No    List of [address] provided: NA    VNS chosen:  No    DME:  none    Home Oxygen: No    Nebulizer: No    CPAP/BIPAP: No    Supplier: N/A    Potential Assistance Needed: No    SNF needed: No    Freedom of choice and list provided: No    Pharm[facility]:  [facility] [place]       Is patient currently receiving oral anticoagulation therapy? No    Is the Patient an [facility] with Readmission Risk Score greater than 14%? No  If yes, pt needs a follow up appointment made within 7 days. Family Members/Caregivers that pt would like involved in their care:    Yes    If yes, list name here:  parents Willow Webb and [id number]    Transportation Provider:  Family             Discharge Plan:  9/2/22 - [employer] - Patient is from home  alone, DME: none, VNS - denies need. Had a kidney bx yesterday as outpatient and is having flank pain, possible discharge Saturday. Plan is home with no needs. Will follow . //pf             Electronically signed by: Dat Lombardi RN on 9/2/2022 at 5:10 PM

## 2022-09-02 NOTE — H&P
[address] [facility]     HISTORY AND PHYSICAL EXAMINATION            Date:   9/2/2022  Patient name:  Slava Shepard  Date of admission:  9/2/2022  6:07 AM  MRN:   [id number]  Account:  [de-identified]  YOB: 1995  PCP:    Kindra Sims MD  Room:   2051/2051-01  Code Status:    Full Code    Chief Complaint:     Chief Complaint   Patient presents with    Flank Pain   Hematuria with clot passing     History Obtained From:     patient    History of Present Illness: The patient is a 32 y.o. Non- / non  male with history of hypertension and chronic kidney disease grade III who presents withFlank Pain post left kidney biopsy that was done on 09/01/2022 and is admitted to the hospital for the management of flank pain and ureteral hematoma. Mr. Chapo Bui, has history of chronic kidney disease grade 3, and hypertension on Labetalol, Losartan and amlodipine. He underwent left kidney biopsy at  for further investigations for CKD with proteinuria and hematuria. Since last night patient started having excruciating pain in his left flank along with hematuria and clot passage. He said that his pain was colicky in nature and extremely severe 10/10. Pain was associated with nausea but no vomiting. Patient also says that the passage of clots was not painful but actually improved his left sided flank pain. He underwent CT on arrival for abdomen and pelvis without contrast that showed obstruction of the left distal ureter along with some hydronephrosis, possibly secondary to clot formation/hematoma post the aforementioned procedure. Patient was then given Dilaudid at the ED which made his pain improve significantly. He has then received Tamsulosin 0.8 mg.  While I was in the patient's room examining him, he showed me a clot that was just passed, perhaps measuring around 1-1.5 cm. Past Medical History:     Past Medical History:   Diagnosis Date    Abnormal EKG     Anxiety     History of chicken pox 2002    Hyperlipidemia     Hypertension     OCD (obsessive compulsive disorder)     Stage 3a chronic kidney disease ([facility] [address] 06/15/2022        Past SurgicalHistory:     Past Surgical History:   Procedure Laterality Date    CAPSULE ENDOSCOPY N/A 6/14/2021    ESOPHAGEAL CAPSULE ENDOSCOPY / FOURNIER performed by Lisa James MD at [address] [place] [address]  9/1/2022    IR BIOPSY KIDNEY PERCUTANEOUS 9/1/2022 [facility] PROCEDURES    UPPER GASTROINTESTINAL ENDOSCOPY N/A 9/2/2020    EGD BIOPSY performed by Lisa James MD at [facility]        Medications Prior to Admission:        Prior to Admission medications    Medication Sig Start Date End Date Taking?  Authorizing Provider   Melatonin-Pyridoxine (MELATIN PO) Take 10 mg by mouth every evening   Yes Historical Provider, MD   labetalol (NORMODYNE) 200 MG tablet Take 1 tablet by mouth 2 times daily 8/22/22   Oscar Tamayo MD   [address] TEST KIT  7/13/22   Historical Provider, MD   tiZANidine (ZANAFLEX) 2 MG tablet Take 1 tablet by mouth nightly as needed (muscle tightness) 8/12/22   Mariel Bazzi MD   famotidine (PEPCID) 40 MG tablet Take 1 tablet by mouth every evening 7/5/22   Lisa James MD   amitriptyline (ELAVIL) 50 MG tablet  4/21/22   Historical Provider, MD   losartan (COZAAR) 100 MG tablet Take 1 tablet by mouth daily 5/26/22 9/2/22  Mariel Bazzi MD   amLODIPine (NORVASC) 10 MG tablet Take 1 tablet by mouth daily 5/26/22 9/2/22  Mariel Bazzi MD   Cholecalciferol (VITAMIN D3) 1.25 MG (90724 UT) CAPS take 1 capsule by mouth every week 5/26/22   Mariel Bazzi MD   montelukast (SINGULAIR) 10 MG tablet Take 1 tablet by mouth daily 5/26/22   Mariel Bazzi MD   levocetirizine (XYZAL) 5 MG tablet Take 1 tablet by mouth every morning 22   Yesy Garzon MD   atorvastatin (LIPITOR) 20 MG tablet Take 1 tablet by mouth daily 22  Yesy Garzon MD   sucralfate (CARAFATE) 1 GM tablet Take 1 tablet by mouth 3 times daily 22   Christie Mayo MD   pantoprazole (PROTONIX) 40 MG tablet Take 1 tablet by mouth 2 times daily 22   Christie Mayo MD   Multiple Vitamins-Minerals (THERAPEUTIC MULTIVITAMIN-MINERALS) tablet Take 1 tablet by mouth daily    Historical Provider, MD   sertraline (ZOLOFT) 100 MG tablet Take 200 mg by mouth 17   Historical Provider, MD        Allergies:     Patient has no known allergies. Social History:     Tobacco:    reports that he has been smoking cigars. He has never used smokeless tobacco.  Alcohol:      reports current alcohol use. Drug Use:  reports no history of drug use. Family History:     Family History   Problem Relation Age of Onset    Heart Disease Mother     Sleep Apnea Mother     Sleep Apnea Father        Review of Systems:     Positive and Negative as described in HPI. Review of Systems   Constitutional: Negative. Eyes: Negative. Respiratory: Negative. Cardiovascular: Negative. Gastrointestinal:  Positive for nausea. Negative for abdominal pain, constipation, diarrhea and vomiting. Genitourinary:  Positive for dysuria, flank pain and hematuria. Negative for difficulty urinating. Musculoskeletal:  Negative for arthralgias, back pain and gait problem. Neurological:  Negative for dizziness, tremors, seizures, weakness, light-headedness, numbness and headaches. Psychiatric/Behavioral:  Negative for agitation, behavioral problems, confusion, self-injury and suicidal ideas. The patient is not nervous/anxious and is not hyperactive.       Physical Exam:   BP (!) 140/82   Pulse 83   Temp 97.5 °F (36.4 °C)   Resp 15   Ht 5' 11\" (1.803 m)   Wt 202 lb 13.2 oz (92 kg)   SpO2 98%   BMI 28.29 kg/m²   Temp (24hrs), Av.5 °F (36.4 °C), Min:97.1 °F (36.2 °C), Max:97.9 °F (36.6 °C)    No results for input(s): POCGLU in the last 72 hours. No intake or output data in the 24 hours ending 09/02/22 1337    Physical Exam  Vitals and nursing note reviewed. Constitutional:       Appearance: Normal appearance. HENT:      Head: Normocephalic. Nose: Nose normal.      Mouth/Throat:      Mouth: Mucous membranes are moist.   Eyes:      Extraocular Movements: Extraocular movements intact. Conjunctiva/sclera: Conjunctivae normal.      Pupils: Pupils are equal, round, and reactive to light. Cardiovascular:      Rate and Rhythm: Normal rate and regular rhythm. Pulses: Normal pulses. Heart sounds: Normal heart sounds. Pulmonary:      Effort: Pulmonary effort is normal. No respiratory distress. Breath sounds: Normal breath sounds. No stridor. No wheezing, rhonchi or rales. Chest:      Chest wall: No tenderness. Abdominal:      General: Abdomen is flat. Bowel sounds are normal. There is no distension. Palpations: Abdomen is soft. There is no mass. Tenderness: There is no abdominal tenderness. There is left CVA tenderness. There is no right CVA tenderness, guarding or rebound. Hernia: No hernia is present. Musculoskeletal:         General: Normal range of motion. Cervical back: Normal range of motion. Skin:     General: Skin is warm. Neurological:      General: No focal deficit present. Mental Status: He is alert and oriented to person, place, and time. Psychiatric:         Behavior: Behavior normal.         Thought Content:  Thought content normal.         Judgment: Judgment normal.       Investigations:     Laboratory Testing:  Recent Results (from the past 24 hour(s))   CBC with Auto Differential    Collection Time: 09/02/22  6:53 AM   Result Value Ref Range    WBC 10.2 3.5 - 11.0 k/uL    RBC 4.50 4.5 - 5.9 m/uL    Hemoglobin 13.2 (L) 13.5 - 17.5 g/dL    Hematocrit 37.6 (L) 41 - 53 % MCV 83.7 80 - 100 fL    MCH 29.4 26 - 34 pg    MCHC 35.2 31 - 37 g/dL    RDW 12.1 11.5 - 14.9 %    Platelets 553 883 - 748 k/uL    MPV 6.5 6.0 - 12.0 fL    Seg Neutrophils 84 (H) 36 - 66 %    Lymphocytes 9 (L) 24 - 44 %    Monocytes 6 1 - 7 %    Eosinophils % 1 0 - 4 %    Basophils 0 0 - 2 %    Segs Absolute 8.50 1.3 - 9.1 k/uL    Absolute Lymph # 0.90 (L) 1.0 - 4.8 k/uL    Absolute Mono # 0.60 0.1 - 1.3 k/uL    Absolute Eos # 0.10 0.0 - 0.4 k/uL    Basophils Absolute 0.00 0.0 - 0.2 k/uL   Comprehensive Metabolic Panel    Collection Time: 09/02/22  6:53 AM   Result Value Ref Range    Glucose 106 (H) 70 - 99 mg/dL    BUN 27 (H) 6 - 20 mg/dL    Creatinine 2.61 (H) 0.70 - 1.20 mg/dL    Calcium 10.1 8.6 - 10.4 mg/dL    Sodium 138 135 - 144 mmol/L    Potassium 3.9 3.7 - 5.3 mmol/L    Chloride 102 98 - 107 mmol/L    CO2 23 20 - 31 mmol/L    Anion Gap 13 9 - 17 mmol/L    Alkaline Phosphatase 86 40 - 129 U/L    ALT 15 5 - 41 U/L    AST 20 <40 U/L    Total Bilirubin 0.7 0.3 - 1.2 mg/dL    Total Protein 7.1 6.4 - 8.3 g/dL    Albumin 4.5 3.5 - 5.2 g/dL    GFR Non-African American 30 (L) >60 mL/min    GFR  36 (L) >60 mL/min    GFR Comment         Protime-INR    Collection Time: 09/02/22  6:53 AM   Result Value Ref Range    Protime 12.6 11.8 - 14.6 sec    INR 1.0    Urinalysis with Reflex to Culture    Collection Time: 09/02/22  7:27 AM    Specimen: Urine, clean catch   Result Value Ref Range    Color, UA Yellow Yellow    Turbidity UA Clear Clear    Glucose, Ur NEGATIVE NEGATIVE    Bilirubin Urine NEGATIVE NEGATIVE    Ketones, Urine NEGATIVE NEGATIVE    Specific Lebeau, UA 1.013 1.000 - 1.030    Urine Hgb LARGE (A) NEGATIVE    pH, UA 6.5 5.0 - 8.0    Protein, UA 2+ (A) NEGATIVE    Urobilinogen, Urine Normal Normal    Nitrite, Urine NEGATIVE NEGATIVE    Leukocyte Esterase, Urine NEGATIVE NEGATIVE   Microscopic Urinalysis    Collection Time: 09/02/22  7:27 AM   Result Value Ref Range    WBC, UA 0 TO 2 /HPF    RBC, UA 51  /HPF    Epithelial Cells UA 0 TO 2 /HPF    Bacteria, UA None None       Imaging/Diagnostics:  CT ABDOMEN PELVIS WO CONTRAST Additional Contrast? None    Result Date: 9/2/2022  EXAMINATION: CT OF THE ABDOMEN AND PELVIS WITHOUT CONTRAST 9/2/2022 6:41 am TECHNIQUE: CT of the abdomen and pelvis was performed without the administration of intravenous contrast. Multiplanar reformatted images are provided for review. Automated exposure control, iterative reconstruction, and/or weight based adjustment of the mA/kV was utilized to reduce the radiation dose to as low as reasonably achievable. COMPARISON: Renal ultrasound 07/23/2022 HISTORY: ORDERING SYSTEM PROVIDED HISTORY: left flank pain, left kidney biopsy yesterday, hx of CKD TECHNOLOGIST PROVIDED HISTORY: left flank pain, left kidney biopsy yesterday, hx of CKD Decision Support Exception - unselect if not a suspected or confirmed emergency medical condition->Emergency Medical Condition (MA) Reason for Exam: pt. c/o severe left flank pain post left kidney biopsy 9-1-2022 @2pm Relevant Medical/Surgical History: hx. of kidney issues per pt FINDINGS: Lower Chest: The visualized heart and lungs show no acute abnormalities. Organs: There is no nephrolithiasis. There is mild left hydronephrosis and hydroureter without evidence for radiopaque obstructing calculus. Mild increased density in the distal 2 cm also of the left ureter may be due to hematoma which may be causing a mild degree of obstruction and consequently the hydronephrosis/hydroureter. There are some pockets of gas in the renal calices attributed to the biopsy. . Liver, spleen, pancreas, adrenal glands and gallbladder show no acute process. GI/Bowel: There is limited evaluation due to absence of oral contrast.  Small hiatal hernia which is fluid-filled. Air/fluid failed moderately distended stomach. No bowel obstruction.   No acute process evident in the GI tract Pelvis: Urinary bladder show small gas pocket attributed to the previous the procedure. No wall thickening or perivesical stranding. Peritoneum/Retroperitoneum: No free fluid no significant lymphadenopathy. Asymmetric enlargement of the left quadratus lumborum muscle at level of left kidney and extending inferiorly along with overlying mild hyperdense stranding fluid and minimal traces compatible with hematoma which may be attributed to the biopsy. There is no drainable fluid collection. Bones/Soft Tissues: No acute abnormality of the bones. The superficial soft tissues show no significant abnormalities. 1. Interval development of mild left hydronephrosis and hydroureter. Mild hyperdensity in the distal left ureter just proximal to the UVJ likely hematoma secondary to biopsy which may be coursing the mild obstructive signs. 2. Very minimal left perinephric fat stranding, some gas pockets within the left renal collecting system and a small gas pocket posterior to the left kidney compatible with post biopsy changes. 3. Asymmetric enlargement of left quadratus lumborum muscle with some overlying fat stranding and minimal hyperdense traces of fluid compatible with post biopsy hematoma. No significant amount of fluid or drainable fluid collection. IR BIOPSY KIDNEY PERCUTANEOUS    Result Date: 9/1/2022  PROCEDURE: IR BIOPSY KIDNEY PERCUTANEOUS 9/1/2022 HISTORY: ORDERING SYSTEM PROVIDED HISTORY: Benign hypertension with chronic kidney disease, stage III ([facility] [address] TECHNOLOGIST PROVIDED HISTORY: Which side should the procedure be performed?->Radiologist Recommendation renal insufficiency, proteinuria, microscopic hematuria CONTRAST: None used SEDATION: Fentanyl 100 mcg IV was administered for pain. Medication was provided and recorded by Radiology nurses.  FLUOROSCOPY DOSE AND TYPE OR TIME AND EXPOSURES: None used DESCRIPTION OF PROCEDURE: Informed consent was obtained after a detailed explanation of the procedure including risks, benefits, and alternatives. Universal protocol was observed. Sterile gowns, masks, hats and gloves utilized for maximal sterile barrier. Ultrasound left kidney was performed and a site chosen for biopsy. Skin over the area was prepared in the usual fashion, as above, using all elements of maximal sterile barrier technique. Local anesthesia was administered with 1% lidocaine. Subsequently, using 20 gauge times 15 and 20 cm CorSitemashert biopsy guns/needles with coaxial technique, 6 passes were made into the lower pole cortex, 3 of which had adequate renal cortical tissue as determined by pathology on site. Avitene was subsequently used to embolize the tract upon removal of the coaxial needle. The patient tolerated procedure well. No immediate complication. FINDINGS: Satisfactory needle position in the lower pole the left kidney demonstrated on multiple sonographic views. Linear echogenic material seen along the tract after embolization. No hematoma detected. Successful core biopsy lower pole left kidney, with results as above. Assessment :      Primary Problem  Left flank pain    Active Hospital Problems    Diagnosis Date Noted    Left flank pain [R10.9] 09/02/2022     Priority: Medium     This is a 31 YO  male with hx of chronic kidney disease stage III, hypertension on multiple dugs as well as hx of depression who presents with left flank pain, hematuria, and passage of clots after he underwent left kidney biopsy. CT abdomen/pelvis WO showed a mildly dilated hydronephrosis and hydroureter with a distal hematoma in the left ureter in keeping with obstructive hydronephrosis.  Patient is admitted for the management of pain and obstructive hydronephrosis  Plan:     Patient status Admit as inpatient in the  Med/Surge    Left flank pain, secondary to obstructive Hydroureter and hydronephrosis  -Urology on board  -CT Abdomen/pelvis WO showed left hydroureter and mild hydronephrosis secondary to obstructive

## 2022-09-03 ENCOUNTER — APPOINTMENT (OUTPATIENT)
Dept: CT IMAGING | Age: 27
End: 2022-09-03
Payer: COMMERCIAL

## 2022-09-03 LAB
ANION GAP SERPL CALCULATED.3IONS-SCNC: 6 MMOL/L (ref 9–17)
BUN BLDV-MCNC: 19 MG/DL (ref 6–20)
CALCIUM SERPL-MCNC: 9 MG/DL (ref 8.6–10.4)
CHLORIDE BLD-SCNC: 106 MMOL/L (ref 98–107)
CO2: 26 MMOL/L (ref 20–31)
CREAT SERPL-MCNC: 1.89 MG/DL (ref 0.7–1.2)
CULTURE: NO GROWTH
GFR AFRICAN AMERICAN: 52 ML/MIN
GFR NON-AFRICAN AMERICAN: 43 ML/MIN
GFR SERPL CREATININE-BSD FRML MDRD: ABNORMAL ML/MIN/{1.73_M2}
GLUCOSE BLD-MCNC: 85 MG/DL (ref 70–99)
HCT VFR BLD CALC: 31.1 % (ref 41–53)
HEMOGLOBIN: 10.7 G/DL (ref 13.5–17.5)
MCH RBC QN AUTO: 29.6 PG (ref 26–34)
MCHC RBC AUTO-ENTMCNC: 34.3 G/DL (ref 31–37)
MCV RBC AUTO: 86.3 FL (ref 80–100)
PDW BLD-RTO: 12.1 % (ref 11.5–14.9)
PLATELET # BLD: 169 K/UL (ref 150–450)
PMV BLD AUTO: 6.5 FL (ref 6–12)
POTASSIUM SERPL-SCNC: 4.3 MMOL/L (ref 3.7–5.3)
RBC # BLD: 3.61 M/UL (ref 4.5–5.9)
SODIUM BLD-SCNC: 138 MMOL/L (ref 135–144)
SPECIMEN DESCRIPTION: NORMAL
WBC # BLD: 4.8 K/UL (ref 3.5–11)

## 2022-09-03 PROCEDURE — 74176 CT ABD & PELVIS W/O CONTRAST: CPT

## 2022-09-03 PROCEDURE — 96376 TX/PRO/DX INJ SAME DRUG ADON: CPT

## 2022-09-03 PROCEDURE — G0378 HOSPITAL OBSERVATION PER HR: HCPCS

## 2022-09-03 PROCEDURE — 6370000000 HC RX 637 (ALT 250 FOR IP): Performed by: STUDENT IN AN ORGANIZED HEALTH CARE EDUCATION/TRAINING PROGRAM

## 2022-09-03 PROCEDURE — 6370000000 HC RX 637 (ALT 250 FOR IP): Performed by: UROLOGY

## 2022-09-03 PROCEDURE — 36415 COLL VENOUS BLD VENIPUNCTURE: CPT

## 2022-09-03 PROCEDURE — 6360000002 HC RX W HCPCS

## 2022-09-03 PROCEDURE — 85027 COMPLETE CBC AUTOMATED: CPT

## 2022-09-03 PROCEDURE — 6370000000 HC RX 637 (ALT 250 FOR IP)

## 2022-09-03 PROCEDURE — 96361 HYDRATE IV INFUSION ADD-ON: CPT

## 2022-09-03 PROCEDURE — 80048 BASIC METABOLIC PNL TOTAL CA: CPT

## 2022-09-03 RX ORDER — OXYCODONE HYDROCHLORIDE AND ACETAMINOPHEN 5; 325 MG/1; MG/1
1 TABLET ORAL EVERY 4 HOURS PRN
Status: DISCONTINUED | OUTPATIENT
Start: 2022-09-03 | End: 2022-09-04 | Stop reason: HOSPADM

## 2022-09-03 RX ORDER — OXYCODONE HYDROCHLORIDE AND ACETAMINOPHEN 5; 325 MG/1; MG/1
2 TABLET ORAL EVERY 4 HOURS PRN
Status: DISCONTINUED | OUTPATIENT
Start: 2022-09-03 | End: 2022-09-03

## 2022-09-03 RX ORDER — POLYETHYLENE GLYCOL 3350 17 G/17G
17 POWDER, FOR SOLUTION ORAL DAILY
Status: DISCONTINUED | OUTPATIENT
Start: 2022-09-03 | End: 2022-09-04 | Stop reason: HOSPADM

## 2022-09-03 RX ADMIN — LOSARTAN POTASSIUM 100 MG: 50 TABLET, FILM COATED ORAL at 10:18

## 2022-09-03 RX ADMIN — ATORVASTATIN CALCIUM 20 MG: 20 TABLET, FILM COATED ORAL at 20:28

## 2022-09-03 RX ADMIN — POLYETHYLENE GLYCOL 3350 17 G: 17 POWDER, FOR SOLUTION ORAL at 17:25

## 2022-09-03 RX ADMIN — AMITRIPTYLINE HYDROCHLORIDE 50 MG: 50 TABLET, FILM COATED ORAL at 20:28

## 2022-09-03 RX ADMIN — TAMSULOSIN HYDROCHLORIDE 0.8 MG: 0.4 CAPSULE ORAL at 10:25

## 2022-09-03 RX ADMIN — PANTOPRAZOLE SODIUM 40 MG: 40 TABLET, DELAYED RELEASE ORAL at 10:18

## 2022-09-03 RX ADMIN — SERTRALINE HYDROCHLORIDE 200 MG: 100 TABLET ORAL at 10:18

## 2022-09-03 RX ADMIN — LABETALOL HYDROCHLORIDE 200 MG: 100 TABLET, FILM COATED ORAL at 10:18

## 2022-09-03 RX ADMIN — Medication 10.5 MG: at 20:28

## 2022-09-03 RX ADMIN — HYDROMORPHONE HYDROCHLORIDE 1 MG: 1 INJECTION, SOLUTION INTRAMUSCULAR; INTRAVENOUS; SUBCUTANEOUS at 06:41

## 2022-09-03 RX ADMIN — OXYCODONE AND ACETAMINOPHEN 1 TABLET: 5; 325 TABLET ORAL at 21:26

## 2022-09-03 RX ADMIN — HYDROMORPHONE HYDROCHLORIDE 1 MG: 1 INJECTION, SOLUTION INTRAMUSCULAR; INTRAVENOUS; SUBCUTANEOUS at 02:07

## 2022-09-03 RX ADMIN — PANTOPRAZOLE SODIUM 40 MG: 40 TABLET, DELAYED RELEASE ORAL at 20:28

## 2022-09-03 RX ADMIN — OXYCODONE AND ACETAMINOPHEN 1 TABLET: 5; 325 TABLET ORAL at 17:27

## 2022-09-03 RX ADMIN — MONTELUKAST 10 MG: 10 TABLET, FILM COATED ORAL at 10:18

## 2022-09-03 RX ADMIN — CETIRIZINE HYDROCHLORIDE 5 MG: 10 TABLET, FILM COATED ORAL at 10:18

## 2022-09-03 RX ADMIN — AMLODIPINE BESYLATE 10 MG: 10 TABLET ORAL at 10:18

## 2022-09-03 RX ADMIN — LABETALOL HYDROCHLORIDE 200 MG: 100 TABLET, FILM COATED ORAL at 20:33

## 2022-09-03 RX ADMIN — OXYCODONE AND ACETAMINOPHEN 1 TABLET: 5; 325 TABLET ORAL at 10:25

## 2022-09-03 ASSESSMENT — PAIN DESCRIPTION - DESCRIPTORS: DESCRIPTORS: SORE

## 2022-09-03 ASSESSMENT — PAIN DESCRIPTION - PAIN TYPE: TYPE: ACUTE PAIN

## 2022-09-03 ASSESSMENT — PAIN DESCRIPTION - ORIENTATION: ORIENTATION: LEFT

## 2022-09-03 ASSESSMENT — PAIN SCALES - GENERAL
PAINLEVEL_OUTOF10: 7
PAINLEVEL_OUTOF10: 5
PAINLEVEL_OUTOF10: 6
PAINLEVEL_OUTOF10: 3
PAINLEVEL_OUTOF10: 4
PAINLEVEL_OUTOF10: 7

## 2022-09-03 ASSESSMENT — PAIN DESCRIPTION - FREQUENCY: FREQUENCY: CONTINUOUS

## 2022-09-03 ASSESSMENT — PAIN DESCRIPTION - LOCATION: LOCATION: FLANK

## 2022-09-03 NOTE — PROGRESS NOTES
NEPHROLOGY progress note    Patient :  Maria Del Rosario Griffin; 32 y.o. MRN# [id number]  Location:  2051/2051-01  Attending:  Kavitha Patiño MD  Admit Date:  9/2/2022   Hospital Day: 0      Reason for Consult:  CKD, left hydronephrosis, hematuria      Chief Complaint: Left-sided flank pain, hematuria status post kidney biopsy    Interval history/Subjective. Patient seen and examined patient is feeling better, flank pain is improved no hematuria today. Serum creatinine is improved CT abdomen pelvis without contrast repeat showed improvement in hydronephrosis     History of Present Illness: This is a 32 y.o. male with history of hypertension  who is referred for evaluation and management of CKD stage 3 associated with proteinuria and microscopic hematuria, history of hypertension, chronic kidney disease stage IIIb with baseline creatinine of 2.0 to 2.3 mg/dL patient has been evaluated and followed by Dr. Jose Alberto Briones for hematuria and proteinuria. Patient had kidney biopsy yesterday, postprocedure patient started to have pain last night on the left flank and also had hematuria. Patient came to the ER had a CT abdomen pelvis which showed left hydronephrosis and suspecting a clot at the UVJ junction patient has had been admitted for further evaluation and management.   Labs showed serum creatinine slightly elevated 2.6 mg/dL patient denies any nausea vomiting diarrhea chest pain or shortness of breath          Past Medical History:        Diagnosis Date    Abnormal EKG     Anxiety     History of chicken pox 2002    Hyperlipidemia     Hypertension     OCD (obsessive compulsive disorder)     Stage [facility] 06/15/2022       Past Surgical History:        Procedure Laterality Date    CAPSULE ENDOSCOPY N/A 6/14/2021    ESOPHAGEAL CAPSULE ENDOSCOPY / FOURNIER performed by Suha De La Garza MD at [address] [place] [address]  9/1/2022    IR BIOPSY KIDNEY PERCUTANEOUS 9/1/2022 [address] [place] [address] UPPER GASTROINTESTINAL ENDOSCOPY N/A 9/2/2020    EGD BIOPSY performed by Galilea Nixon MD at [address] [place] [address]       Current Medications:    oxyCODONE-acetaminophen (PERCOCET) 5-325 MG per tablet 1 tablet, Q4H PRN  0.9 % sodium chloride infusion, Continuous  sodium chloride flush 0.9 % injection 5-40 mL, 2 times per day  sodium chloride flush 0.9 % injection 5-40 mL, PRN  0.9 % sodium chloride infusion, PRN  polyethylene glycol (GLYCOLAX) packet 17 g, Daily PRN  acetaminophen (TYLENOL) tablet 650 mg, Q6H PRN   Or  acetaminophen (TYLENOL) suppository 650 mg, Q6H PRN  tamsulosin (FLOMAX) capsule 0.8 mg, Daily  magnesium sulfate 1000 mg in dextrose 5% 100 mL IVPB, PRN  potassium chloride (KLOR-CON M) extended release tablet 40 mEq, PRN   Or  potassium bicarb-citric acid (EFFER-K) effervescent tablet 40 mEq, PRN   Or  potassium chloride 10 mEq/100 mL IVPB (Peripheral Line), PRN  amitriptyline (ELAVIL) tablet 50 mg, Nightly  amLODIPine (NORVASC) tablet 10 mg, Daily  atorvastatin (LIPITOR) tablet 20 mg, Nightly  labetalol (NORMODYNE) tablet 200 mg, BID  losartan (COZAAR) tablet 100 mg, Daily  montelukast (SINGULAIR) tablet 10 mg, Daily  pantoprazole (PROTONIX) tablet 40 mg, BID  melatonin tablet 10.5 mg, Nightly  cetirizine (ZYRTEC) tablet 5 mg, Daily  [START ON 9/6/2022] vitamin D (ERGOCALCIFEROL) capsule 50,000 Units, Weekly  ondansetron (ZOFRAN-ODT) disintegrating tablet 4 mg, Q8H PRN   Or  ondansetron (ZOFRAN) injection 4 mg, Q6H PRN  sertraline (ZOLOFT) tablet 200 mg, Daily    sodium chloride flush 0.9 % injection 5-40 mL, 2 times per day  sodium chloride flush 0.9 % injection 5-40 mL, PRN  0.9 % sodium chloride infusion, PRN  acetaminophen (TYLENOL) tablet 650 mg, Q4H PRN      Allergies:  Patient has no known allergies.     Social History:   Social History     Socioeconomic History    Marital status: Single     Spouse name: Not on file    Number of children: Not on file    Years of education: Not on file    Highest education level: Not on file   Occupational History    Not on file   Tobacco Use    Smoking status: Some Days     Types: Cigars    Smokeless tobacco: Never    Tobacco comments:     Occasional cigar with relatives (major holidays). Vaping Use    Vaping Use: Never used   Substance and Sexual Activity    Alcohol use: Yes     Comment: socially- not weekly, states monthly    Drug use: No    Sexual activity: Not on file   Other Topics Concern    Not on file   Social History Narrative    Not on file     Social Determinants of Health     Financial Resource Strain: Low Risk     Difficulty of Paying Living Expenses: Not hard at all   Food Insecurity: No Food Insecurity    Worried About Running Out of Food in the Last Year: Never true    Ran Out of Food in the Last Year: Never true   Transportation Needs: Not on file   Physical Activity: Not on file   Stress: Not on file   Social Connections: Not on file   Intimate Partner Violence: Not on file   Housing Stability: Not on file           Objective:  CURRENT TEMPERATURE:  Temp: 97.7 °F (36.5 °C)  MAXIMUM TEMPERATURE OVER 24HRS:  Temp (24hrs), Av.1 °F (36.7 °C), Min:97.7 °F (36.5 °C), Max:98.4 °F (36.9 °C)    CURRENT RESPIRATORY RATE:  Resp: 18  CURRENT PULSE:  Heart Rate: 75  CURRENT BLOOD PRESSURE:  BP: 117/80  24HR BLOOD PRESSURE RANGE:  Systolic (55SFT), QNT:959 , Min:117 , MDX:620   ; Diastolic (46PLW), TZA:80, Min:80, Max:80    24HR INTAKE/OUTPUT:    Intake/Output Summary (Last 24 hours) at 9/3/2022 1516  Last data filed at 9/3/2022 0610  Gross per 24 hour   Intake 2400 ml   Output --   Net 2400 ml     Patient Vitals for the past 96 hrs (Last 3 readings):   Weight   22 0937 202 lb 13.2 oz (92 kg)   22 0602 205 lb (93 kg)       Physical Exam:  GENERAL APPEARANCE: Alert and cooperative, and appears to be in no acute distress. HEAD: normocephalic  EYES:  EOMI. Not pale, anicteric   NOSE:  No nasal discharge.     THROAT:  Oral cavity and pharynx normal. Moist  CARDIAC: Normal S1 and S2. No S3, S4 or murmurs. Rhythm is regular. LUNGS: Normal respiratory effort no accessory muscle use  NECK: Neck supple, non-tender   BACK: Examination of the spine reveals normal gait and posture, no spinal deformity, symmetry of spinal muscles, without tenderness, decreased range of motion or muscular spasm  MUSKULOSKELETAL: Adequately aligned spine. No joint erythema or tenderness. EXTREMITIES: No edema. Peripheral pulses intact. NEURO: Nonfocal      Labs:   CBC:  Recent Labs     09/01/22  1040 09/02/22  0653 09/03/22  0527   WBC  --  10.2 4.8   RBC  --  4.50 3.61*   HGB  --  13.2* 10.7*   HCT  --  37.6* 31.1*   MCV  --  83.7 86.3   MCH  --  29.4 29.6   MCHC  --  35.2 34.3   RDW  --  12.1 12.1    231 169   MPV  --  6.5 6.5      BMP:   Recent Labs     09/02/22  0653 09/03/22  0527    138   K 3.9 4.3    106   CO2 23 26   BUN 27* 19   CREATININE 2.61* 1.89*   GLUCOSE 106* 85   CALCIUM 10.1 9.0      Phosphorus:  No results for input(s): PHOS in the last 72 hours. Magnesium: No results for input(s): MG in the last 72 hours. Albumin:   Recent Labs     09/02/22  0653   LABALBU 4.5       IRON:  No results for input(s): IRON in the last 72 hours. Iron Saturation:  Invalid input(s): PERCENTFE  TIBC:  No results for input(s): TIBC in the last 72 hours. FERRITIN:  No results for input(s): FERRITIN in the last 72 hours. SPEP: No results for input(s): SPEP in the last 72 hours.    Recent Labs     09/02/22  0653   PROT 7.1       Urinalysis:    Recent Labs     09/02/22  0727   NITRU NEGATIVE   COLORU Yellow   PHUR 6.5   WBCUA 0 TO 2   RBCUA 51    BACTERIA None   SPECGRAV 1.013   LEUKOCYTESUR NEGATIVE   UROBILINOGEN Normal   BILIRUBINUR NEGATIVE   GLUCOSEU NEGATIVE   KETUA NEGATIVE             Assessment:  CKD stage III with baseline creatinine of 2.0 to 2.3 mg/dL, diagnosis unknown status post kidney biopsy yesterday for hematuria and proteinuria, some nephrotic proteinuria with 24-hour urine protein 400 mg, LO equivocal C3-C4 normal antidouble-stranded DNA slightly positive kappa lambda ratio slightly elevated 1.6 electrophoresis immunofixation, normal electrophoretic patterns, other differential diagnosis includes interstitial nephritis secondary to PPIs, biopsy results are pending. Serum creatinine improved 1.8 mg/dL nonoliguric  Essential hypertension  Left flank pain left hydronephrosis most likely secondary to clots secondary to kidney biopsy causing obstruction at the left UVJ junction          Plan:  Decrease IV fluids  Repeat CT abdomen and pelvis results reviewed hydronephrosis improved  Continue blood pressure medications  Follow-up kidney biopsy  Follow H&H    Discharge planning in progress    Thank you for the consultation.       Electronically signed by Santino López MD on 9/3/2022 at 3:16 PM

## 2022-09-03 NOTE — CONSULTS
Sandrine Porter  Urology Consultation    Patient:  Cal Siddiqi  MRN: [id number]  YOB: 1995    CHIEF COMPLAINT: Renal colic, acute kidney injury    HISTORY OF PRESENT ILLNESS:   The patient is a 32 y.o. male who presents with severe flank pain, discussed status with emergency room team Dr. Linnea Wells, the patient had renal biopsy by Dr. Villasenor Angry yesterday    From discussing with the patient it was a difficult biopsy, thin cortex,    Later on the patient developed hematuria and flank pain. He presented to the emergency room with acute kidney injury, he has a known history of chronic kidney disease    The patient follows up with Dr. Melvina Almonte  With a history of hypertension and chronic kidney disease stage III as well as chronic migraine for which the patient has taken Excedrin    Creatinine 3 years ago was around 1.3 and gradually increased to the level of 2.37 in May and then the most recent levels as reported  Creatinine noted to be at 2.6    CT imaging demonstrating mild hydronephrosis with concern about small clots    Patient's old records, notes and chart reviewed and summarized above.     Past Medical History:    Past Medical History:   Diagnosis Date    Abnormal EKG     Anxiety     History of chicken pox 2002    Hyperlipidemia     Hypertension     OCD (obsessive compulsive disorder)     Stage 3a chronic kidney disease ([facility] [address] 06/15/2022       Past Surgical History:    Past Surgical History:   Procedure Laterality Date    CAPSULE ENDOSCOPY N/A 6/14/2021    ESOPHAGEAL CAPSULE ENDOSCOPY / FOURNIER performed by Christie Mayo MD at [address] [place] [address]  9/1/2022    IR BIOPSY KIDNEY PERCUTANEOUS 9/1/2022 [facility]    UPPER GASTROINTESTINAL ENDOSCOPY N/A 9/2/2020    EGD BIOPSY performed by Christie [facility] MD at [facility]     Previous  surgery:  None      Medications:    Scheduled Meds:   sodium chloride flush  5-40 mL IntraVENous 2 times per day    tamsulosin  0.8 mg Oral Daily    amitriptyline  50 mg Oral Nightly    amLODIPine  10 mg Oral Daily    atorvastatin  20 mg Oral Nightly    labetalol  200 mg Oral BID    losartan  100 mg Oral Daily    montelukast  10 mg Oral Daily    pantoprazole  40 mg Oral BID    melatonin  10.5 mg Oral Nightly    cetirizine  5 mg Oral Daily    [START ON 9/6/2022] vitamin D  50,000 Units Oral Weekly    sertraline  200 mg Oral Daily     Continuous Infusions:   sodium chloride 100 mL/hr at 09/02/22 2222    sodium chloride       PRN Meds:.oxyCODONE-acetaminophen, sodium chloride flush, sodium chloride, polyethylene glycol, acetaminophen **OR** acetaminophen, magnesium sulfate, potassium chloride **OR** potassium alternative oral replacement **OR** potassium chloride, ondansetron **OR** ondansetron    Allergies:  Patient has no known allergies. Social History:    Social History     Socioeconomic History    Marital status: Single     Spouse name: Not on file    Number of children: Not on file    Years of education: Not on file    Highest education level: Not on file   Occupational History    Not on file   Tobacco Use    Smoking status: Some Days     Types: Cigars    Smokeless tobacco: Never    Tobacco comments:     Occasional cigar with relatives (major holidays).    Vaping Use    Vaping Use: Never used   Substance and Sexual Activity    Alcohol use: Yes     Comment: socially- not weekly, states monthly    Drug use: No    Sexual activity: Not on file   Other Topics Concern    Not on file   Social History Narrative    Not on file     Social Determinants of Health     Financial Resource Strain: Low Risk     Difficulty of Paying Living Expenses: Not hard at all   Food Insecurity: No Food Insecurity    Worried About Running Out of Food in the Last Year: Never true    Ran Out of Food in the Last Year: Never true   Transportation Needs: Not on file   Physical Activity: Not on file   Stress: Not on file   Social Connections: Not on file   Intimate Partner -----------------------------------------------------------------  Imaging Results:  Renal ultrasound finding from July prior to the biopsy showed increased echogenicity suggesting medical renal disease    The right kidney measuring 10.7 cm and the left kidney 11.9 cm with no hydronephrosis or renal calculi  CT imaging findings:    Organs: There is no nephrolithiasis. There is mild left hydronephrosis and   hydroureter without evidence for radiopaque obstructing calculus. Mild   increased density in the distal 2 cm also of the left ureter may be due to   hematoma which may be causing a mild degree of obstruction and consequently   the hydronephrosis/hydroureter. There are some pockets of gas in the renal   calices attributed to the biopsy. .       Liver, spleen, pancreas, adrenal glands and gallbladder show no acute process. GI/Bowel: There is limited evaluation due to absence of oral contrast.  Small   hiatal hernia which is fluid-filled. Air/fluid failed moderately distended   stomach. No bowel obstruction. No acute process evident in the GI tract       Pelvis: Urinary bladder show small gas pocket attributed to the previous the   procedure. No wall thickening or perivesical stranding. Peritoneum/Retroperitoneum: No free fluid no significant lymphadenopathy. Asymmetric enlargement of the left quadratus lumborum muscle at level of left   kidney and extending inferiorly along with overlying mild hyperdense   stranding fluid and minimal traces compatible with hematoma which may be   attributed to the biopsy. There is no drainable fluid collection. Bones/Soft Tissues: No acute abnormality of the bones. The superficial soft   tissues show no significant abnormalities. Impression   1. Interval development of mild left hydronephrosis and hydroureter.   Mild   hyperdensity in the distal left ureter just proximal to the UVJ likely   hematoma secondary to biopsy which may be coursing

## 2022-09-03 NOTE — PROGRESS NOTES
[address]    PROGRESS NOTE             9/3/2022    7:32 AM    Name:   Yanni Castro  MRN:     [id number]     Acct:      [de-identified]   Room:   2051/2051-01  IP Day:  0  Admit Date:  9/2/2022  6:07 AM    PCP:  Gera Kay MD  Code Status:  Full Code    Subjective:     C/C:   Chief Complaint   Patient presents with    Flank Pain     Interval History Status: improved. Patient seen and examined at the bed side, no new acute events overnight. Patient remain alert and oriented*3. Patient reports no passage of clots overnight. He also reports improvement in pain. Today patient's creatinine (1.89) is significantly better even compared to his baseline(2.0-2.3). Notes from nursing staff and Consults had been reviewed, and the overnight progress had been checked with the nursing staff as well. Brief History:     The patient is a 32 y.o. Non- / non  male with history of hypertension and chronic kidney disease grade III who presents withFlank Pain post left kidney biopsy that was done on 09/01/2022 and is admitted to the hospital for the management of flank pain and ureteral hematoma. Mr. Elisabeth Kramer, has history of chronic kidney disease grade 3, and hypertension on Labetalol, Losartan and amlodipine. He underwent left kidney biopsy at  for further investigations for CKD with proteinuria and hematuria. Since last night patient started having excruciating pain in his left flank along with hematuria and clot passage. He said that his pain was colicky in nature and extremely severe 10/10. Pain was associated with nausea but no vomiting. Patient also says that the passage of clots was not painful but actually improved his left sided flank pain.    He underwent CT on arrival for abdomen and pelvis without contrast that showed obstruction of the left distal ureter along with some hydronephrosis, possibly secondary to clot formation/hematoma post the aforementioned procedure. Patient was then given Dilaudid at the ED which made his pain improve significantly. He has then received Tamsulosin 0.8 mg. While I was in the patient's room examining him, he showed me a clot that was just passed, perhaps measuring around 1-1.5 cm. Review of Systems:     CONSTITUTIONAL:  no fevers  Psych: Normal mood and affect, no depression, no suicidal ideation. EYES: negative for blury vision  HEENT: No headaches, no nasal congestion, no difficulty swallowing  RESPIRATORY:negative for dyspnea, no wheezing, no Cough  CARDIOVASCULAR: negative for chest pain, no palpitations  GASTROINTESTINAL: +nausea, no vomiting, no change in bowel habits, no abdominal pain   GENITOURINARY: +left Flank pain, Hematuria with passage of clots. MUSCULOSKELETAL: no joint pains, no muscle aches, no swelling of joints or extremities  NEUROLOGICAL: No weakness or numbness      Medications:      Allergies:    No Known Allergies    Current Meds:   Scheduled Meds:    sodium chloride flush  5-40 mL IntraVENous 2 times per day    enoxaparin  40 mg SubCUTAneous Daily    tamsulosin  0.8 mg Oral Daily    amitriptyline  50 mg Oral Nightly    amLODIPine  10 mg Oral Daily    atorvastatin  20 mg Oral Nightly    labetalol  200 mg Oral BID    losartan  100 mg Oral Daily    montelukast  10 mg Oral Daily    pantoprazole  40 mg Oral BID    melatonin  10.5 mg Oral Nightly    cetirizine  5 mg Oral Daily    [START ON 9/6/2022] vitamin D  50,000 Units Oral Weekly    sertraline  200 mg Oral Daily     Continuous Infusions:    sodium chloride 100 mL/hr at 09/02/22 2222    sodium chloride       PRN Meds: oxyCODONE-acetaminophen, oxyCODONE-acetaminophen, sodium chloride flush, sodium chloride, polyethylene glycol, acetaminophen **OR** acetaminophen, magnesium sulfate, potassium chloride **OR** potassium alternative oral replacement **OR** potassium chloride, ondansetron **OR** ondansetron    Data: Past Medical History:   has a past medical history of Abnormal EKG, Anxiety, History of chicken pox, Hyperlipidemia, Hypertension, OCD (obsessive compulsive disorder), and Stage 3a chronic kidney disease ([address] Social History:   reports that he has been smoking cigars. He has never used smokeless tobacco. He reports current alcohol use. He reports that he does not use drugs. Family History:   Family History   Problem Relation Age of Onset    Heart Disease Mother     Sleep Apnea Mother     Sleep Apnea Father        Vitals:  /80   Pulse 75   Temp 97.7 °F (36.5 °C)   Resp 18   Ht 5' 11\" (1.803 m)   Wt 202 lb 13.2 oz (92 kg)   SpO2 98%   BMI 28.29 kg/m²   Temp (24hrs), Av.9 °F (36.6 °C), Min:97.5 °F (36.4 °C), Max:98.4 °F (36.9 °C)      No results for input(s): POCGLU in the last 72 hours. I/O(24Hr): Intake/Output Summary (Last 24 hours) at 9/3/2022 0732  Last data filed at 9/3/2022 3805  Gross per 24 hour   Intake 3020 ml   Output --   Net 3020 ml       Labs:        Lab Results   Component Value Date/Time    SPECIAL NOT REPORTED 2020 02:59 PM     No results found for: CULTURE      Radiology:    CT ABDOMEN PELVIS WO CONTRAST Additional Contrast? None    Result Date: 2022  EXAMINATION: CT OF THE ABDOMEN AND PELVIS WITHOUT CONTRAST 2022 6:41 am TECHNIQUE: CT of the abdomen and pelvis was performed without the administration of intravenous contrast. Multiplanar reformatted images are provided for review. Automated exposure control, iterative reconstruction, and/or weight based adjustment of the mA/kV was utilized to reduce the radiation dose to as low as reasonably achievable.  COMPARISON: Renal ultrasound 2022 HISTORY: ORDERING SYSTEM PROVIDED HISTORY: left flank pain, left kidney biopsy yesterday, hx of CKD TECHNOLOGIST PROVIDED HISTORY: left flank pain, left kidney biopsy yesterday, hx of CKD Decision Support Exception - unselect if not a suspected or confirmed collecting system and a small gas pocket posterior to the left kidney compatible with post biopsy changes. 3. Asymmetric enlargement of left quadratus lumborum muscle with some overlying fat stranding and minimal hyperdense traces of fluid compatible with post biopsy hematoma. No significant amount of fluid or drainable fluid collection. IR BIOPSY KIDNEY PERCUTANEOUS    Result Date: 9/1/2022  PROCEDURE: IR BIOPSY KIDNEY PERCUTANEOUS 9/1/2022 HISTORY: ORDERING SYSTEM PROVIDED HISTORY: Benign hypertension with chronic kidney disease, stage III ([address] TECHNOLOGIST PROVIDED HISTORY: Which side should the procedure be performed?->Radiologist Recommendation renal insufficiency, proteinuria, microscopic hematuria CONTRAST: None used SEDATION: Fentanyl 100 mcg IV was administered for pain. Medication was provided and recorded by Radiology nurses. FLUOROSCOPY DOSE AND TYPE OR TIME AND EXPOSURES: None used DESCRIPTION OF PROCEDURE: Informed consent was obtained after a detailed explanation of the procedure including risks, benefits, and alternatives. Universal protocol was observed. Sterile gowns, masks, hats and gloves utilized for maximal sterile barrier. Ultrasound left kidney was performed and a site chosen for biopsy. Skin over the area was prepared in the usual fashion, as above, using all elements of maximal sterile barrier technique. Local anesthesia was administered with 1% lidocaine. Subsequently, using 20 gauge times 15 and 20 cm Corvocet biopsy guns/needles with coaxial technique, 6 passes were made into the lower pole cortex, 3 of which had adequate renal cortical tissue as determined by pathology on site. Avitene was subsequently used to embolize the tract upon removal of the coaxial needle. The patient tolerated procedure well. No immediate complication. FINDINGS: Satisfactory needle position in the lower pole the left kidney demonstrated on multiple sonographic views.   Linear echogenic material seen along the tract after embolization. No hematoma detected. Successful core biopsy lower pole left kidney, with results as above. Physical Examination:        PHYSICAL EXAM:  General Appearance  Alert , awake, not in acute distress  Psych: Normal mood and affect, normal behavior, not agitated, maintaining good eye contact   HEENT - Head is normocephalic, atraumatic. Neck: supple, no rigidity, normal ROM, no neck swellings, normal thyroid gland  Lungs - Bilateral equal air entry, no wheezes, rales or rhonchi, aeration good  Cardiovascular - Heart sounds are normal.  Regular rhythm, normal rate without murmur, gallop or rub. Abdomen - Soft, nontender, nondistended, no masses or organomegaly. There is left CVA tenderness  Neurologic - There are no new focal motor or sensory deficits  Skin - No bruising or bleeding on exposed skin area  Extremities - No cyanosis, clubbing or edema    Assessment:        Primary Problem  Left flank pain    Active Hospital Problems    Diagnosis Date Noted    Left flank pain [R10.9] 09/02/2022     Priority: Medium     This a 33 YO male patient with history of hypertension on multidrug, and worsening CKD stage III, who presents with post left kidney biopsy with significant costovertebral angle pain as well as hematuria with passage of clots. Patient received a non enhanced CT in the ED that showed hydroureter and hydronephrosis of the left kidney and ureter, respectively. This was most likely secondary to a hematoma in his distal left ureter post kidney biopsy. Patient was started on Flomax 0.8 mg and was treated with analgesics such as Morphine and hydromorphone as appropriate.      Plan:        Patient status Admit as inpatient in the  Med/Surge     Left flank pain, secondary to obstructive Hydroureter and hydronephrosis  -Urology on board  -CT Abdomen/pelvis WO showed left hydroureter and mild hydronephrosis secondary to obstructive hematoma/clot in the distal ureter  -Patient on 100 ml/hr NS  -Flomax 0.8 mg was initiated by Urology.  -Diluded 1 mg Q4H for severe pain, and 0.5 for moderate pain        2. Chronic kidney disease  -Nephrology on board  -Creatinine on presenation was 2.61, aug 2022 cr was 2.3  -Patient on 100ml/hr NS     3. Hypertension  -Continue Losartan 100 mg Q24H  -Continue Labetalol 200 mg BID  -Continue Amlodipine 10 mg Q24H     4. Depression  -Continue Sertraline 200mg Daily  -Continue Amitriptyline 50mg      5. Continue patient on Lipitor home dose.         Sebas Guerrero MD  9/3/2022  7:32 AM

## 2022-09-03 NOTE — PROGRESS NOTES
Zahraa Shields   Urology Progress Note            Subjective: Follow-up hydronephrosis status post renal biopsy    Patient Vitals for the past 24 hrs:   BP Temp Pulse Resp SpO2   09/03/22 0615 117/80 97.7 °F (36.5 °C) 75 18 98 %   09/02/22 1825 130/80 98.4 °F (36.9 °C) 86 16 98 %   09/02/22 1659 -- -- -- 15 --   09/02/22 1311 -- -- -- 15 --       Intake/Output Summary (Last 24 hours) at 9/3/2022 1020  Last data filed at 9/3/2022 0610  Gross per 24 hour   Intake 3020 ml   Output --   Net 3020 ml       Recent Labs     09/01/22  1040 09/02/22  0653 09/03/22  0527   WBC  --  10.2 4.8   HGB  --  13.2* 10.7*   HCT  --  37.6* 31.1*   MCV  --  83.7 86.3    231 169     Recent Labs     09/02/22  0653 09/03/22  0527    138   K 3.9 4.3    106   CO2 23 26   BUN 27* 19   CREATININE 2.61* 1.89*       Recent Labs     09/02/22  0727   COLORU Yellow   PHUR 6.5   WBCUA 0 TO 2   RBCUA 51    BACTERIA None   SPECGRAV 1.013   LEUKOCYTESUR NEGATIVE   UROBILINOGEN Normal   BILIRUBINUR NEGATIVE       Additional Lab/culture results:    Physical Exam: Discussed status with the patient he is feeling better we stopped Dilaudid and morphine and we will continue with Percocet at this time patient on Flomax, renal function significantly improved    Interval Imaging Findings:  Organs: The the liver, gallbladder, pancreas and spleen appear normal.  There   is no significant residual left hydronephrosis. There is no perinephric   hematoma. Some left perinephric stranding is noted, in keeping with the   recent biopsy. The quadratus lumborum muscle on the left is unremarkable. The adrenal glands appear normal.       GI/Bowel: There is a small hiatal hernia. Small bowel loops appear   unremarkable. There is some diverticular disease of the sigmoid colon. There is some minimal free fluid noted in the pelvis. Pelvis:  The bladder, prostate and rectum appear normal.  There is no pelvic lymphadenopathy.        Peritoneum/Retroperitoneum: Unremarkable       Bones/Soft Tissues: Unremarkable       Impression:  Status post renal biopsy, patient significantly improved  Patient Active Problem List   Diagnosis    Essential hypertension    Other hyperlipidemia    Generalized anxiety disorder    Major depressive disorder, recurrent episode, moderate (HCC)    Stage 3a chronic kidney disease ([facility] [address]    Left flank pain       Plan: Consideration for discharge tomorrow if Stable    Armani Palomino MD  10:20 AM 9/3/2022

## 2022-09-04 VITALS
OXYGEN SATURATION: 99 % | HEART RATE: 86 BPM | BODY MASS INDEX: 28.4 KG/M2 | TEMPERATURE: 97.9 F | DIASTOLIC BLOOD PRESSURE: 78 MMHG | RESPIRATION RATE: 16 BRPM | WEIGHT: 202.82 LBS | HEIGHT: 71 IN | SYSTOLIC BLOOD PRESSURE: 126 MMHG

## 2022-09-04 LAB
ANION GAP SERPL CALCULATED.3IONS-SCNC: 8 MMOL/L (ref 9–17)
BUN BLDV-MCNC: 18 MG/DL (ref 6–20)
CALCIUM SERPL-MCNC: 10 MG/DL (ref 8.6–10.4)
CHLORIDE BLD-SCNC: 101 MMOL/L (ref 98–107)
CO2: 28 MMOL/L (ref 20–31)
CREAT SERPL-MCNC: 1.76 MG/DL (ref 0.7–1.2)
GFR AFRICAN AMERICAN: 57 ML/MIN
GFR NON-AFRICAN AMERICAN: 47 ML/MIN
GFR SERPL CREATININE-BSD FRML MDRD: ABNORMAL ML/MIN/{1.73_M2}
GLUCOSE BLD-MCNC: 81 MG/DL (ref 70–99)
HCT VFR BLD CALC: 33.1 % (ref 41–53)
HEMOGLOBIN: 11.8 G/DL (ref 13.5–17.5)
MCH RBC QN AUTO: 29.7 PG (ref 26–34)
MCHC RBC AUTO-ENTMCNC: 35.6 G/DL (ref 31–37)
MCV RBC AUTO: 83.6 FL (ref 80–100)
PDW BLD-RTO: 12.2 % (ref 11.5–14.9)
PLATELET # BLD: 187 K/UL (ref 150–450)
PMV BLD AUTO: 6.1 FL (ref 6–12)
POTASSIUM SERPL-SCNC: 4.2 MMOL/L (ref 3.7–5.3)
RBC # BLD: 3.97 M/UL (ref 4.5–5.9)
SODIUM BLD-SCNC: 137 MMOL/L (ref 135–144)
WBC # BLD: 4.9 K/UL (ref 3.5–11)

## 2022-09-04 PROCEDURE — 6370000000 HC RX 637 (ALT 250 FOR IP): Performed by: STUDENT IN AN ORGANIZED HEALTH CARE EDUCATION/TRAINING PROGRAM

## 2022-09-04 PROCEDURE — 96361 HYDRATE IV INFUSION ADD-ON: CPT

## 2022-09-04 PROCEDURE — 6370000000 HC RX 637 (ALT 250 FOR IP)

## 2022-09-04 PROCEDURE — G0378 HOSPITAL OBSERVATION PER HR: HCPCS

## 2022-09-04 PROCEDURE — 36415 COLL VENOUS BLD VENIPUNCTURE: CPT

## 2022-09-04 PROCEDURE — 85027 COMPLETE CBC AUTOMATED: CPT

## 2022-09-04 PROCEDURE — 6370000000 HC RX 637 (ALT 250 FOR IP): Performed by: UROLOGY

## 2022-09-04 PROCEDURE — 99217 PR OBSERVATION CARE DISCHARGE MANAGEMENT: CPT | Performed by: INTERNAL MEDICINE

## 2022-09-04 PROCEDURE — 80048 BASIC METABOLIC PNL TOTAL CA: CPT

## 2022-09-04 RX ORDER — TAMSULOSIN HYDROCHLORIDE 0.4 MG/1
0.8 CAPSULE ORAL DAILY
Qty: 30 CAPSULE | Refills: 3 | Status: SHIPPED | OUTPATIENT
Start: 2022-09-05

## 2022-09-04 RX ADMIN — CETIRIZINE HYDROCHLORIDE 5 MG: 10 TABLET, FILM COATED ORAL at 09:05

## 2022-09-04 RX ADMIN — LOSARTAN POTASSIUM 100 MG: 50 TABLET, FILM COATED ORAL at 09:06

## 2022-09-04 RX ADMIN — LABETALOL HYDROCHLORIDE 200 MG: 100 TABLET, FILM COATED ORAL at 09:06

## 2022-09-04 RX ADMIN — SERTRALINE HYDROCHLORIDE 200 MG: 100 TABLET ORAL at 09:06

## 2022-09-04 RX ADMIN — MONTELUKAST 10 MG: 10 TABLET, FILM COATED ORAL at 09:06

## 2022-09-04 RX ADMIN — TAMSULOSIN HYDROCHLORIDE 0.8 MG: 0.4 CAPSULE ORAL at 09:06

## 2022-09-04 RX ADMIN — PANTOPRAZOLE SODIUM 40 MG: 40 TABLET, DELAYED RELEASE ORAL at 09:06

## 2022-09-04 RX ADMIN — AMLODIPINE BESYLATE 10 MG: 10 TABLET ORAL at 09:06

## 2022-09-04 RX ADMIN — POLYETHYLENE GLYCOL 3350 17 G: 17 POWDER, FOR SOLUTION ORAL at 09:08

## 2022-09-04 ASSESSMENT — PAIN DESCRIPTION - PAIN TYPE
TYPE: ACUTE PAIN
TYPE: ACUTE PAIN

## 2022-09-04 ASSESSMENT — PAIN SCALES - GENERAL
PAINLEVEL_OUTOF10: 2
PAINLEVEL_OUTOF10: 2

## 2022-09-04 ASSESSMENT — PAIN DESCRIPTION - LOCATION
LOCATION: FLANK
LOCATION: FLANK

## 2022-09-04 NOTE — FLOWSHEET NOTE
Discharge instructions reviewed with the patient and all questions answered. Patient ambulated independently with his mom to the discharge area.

## 2022-09-04 NOTE — PLAN OF CARE
Problem: Discharge Planning  Goal: Discharge to home or other facility with appropriate resources  Outcome: Progressing  Flowsheets (Taken 9/3/2022 2000)  Discharge to home or other facility with appropriate resources:   Identify barriers to discharge with patient and caregiver   Arrange for needed discharge resources and transportation as appropriate     Problem: Pain  Goal: Verbalizes/displays adequate comfort level or baseline comfort level  Outcome: Progressing     Problem: Safety - Adult  Goal: Free from fall injury  Outcome: Progressing     Problem: ABCDS Injury Assessment  Goal: Absence of physical injury  Outcome: Progressing  Flowsheets (Taken 9/3/2022 2154)  Absence of Physical Injury: Implement safety measures based on patient assessment

## 2022-09-04 NOTE — PLAN OF CARE
Problem: Discharge Planning  Goal: Discharge to home or other facility with appropriate resources  9/4/2022 1307 by Yoly Crespo RN  Outcome: Completed  9/4/2022 0624 by Ki Grace RN  Outcome: Progressing  Flowsheets (Taken 9/3/2022 2000)  Discharge to home or other facility with appropriate resources:   Identify barriers to discharge with patient and caregiver   Arrange for needed discharge resources and transportation as appropriate     Problem: Pain  Goal: Verbalizes/displays adequate comfort level or baseline comfort level  9/4/2022 1307 by Yoly Crespo RN  Outcome: Completed  9/4/2022 0624 by Ki Grace RN  Outcome: Progressing     Problem: Safety - Adult  Goal: Free from fall injury  9/4/2022 1307 by Yoly Crespo RN  Outcome: Completed  9/4/2022 0624 by Ki Grace RN  Outcome: Progressing     Problem: ABCDS Injury Assessment  Goal: Absence of physical injury  9/4/2022 1307 by Yoly Crespo RN  Outcome: Completed  9/4/2022 0624 by Ki Grace RN  Outcome: Progressing  Flowsheets (Taken 9/3/2022 2154)  Absence of Physical Injury: Implement safety measures based on patient assessment

## 2022-09-04 NOTE — DISCHARGE SUMMARY
[address]    Discharge Summary     Patient ID: Pérez Krishna  :  1995   MRN: [id number]     ACCOUNT:  [de-identified]   Patient's PCP: Arabella Sanderson MD  Admit Date: 2022   Discharge Date: 2022   Length of Stay: 2  Code Status:  Full Code  Admitting Physician: Flory Dyer MD  Discharge Physician: Bhupendra Perez MD     Active Discharge Diagnoses:       Primary Problem  Left flank pain      Hospital Problems  Active Hospital Problems    Diagnosis Date Noted    Left flank pain [R10.9] 2022     Priority: Medium       Admission Condition:  fair     Discharged Condition: good    Hospital Stay:       Hospital Course: This a 31 YO male patient with history of hypertension on multidrug, and worsening CKD stage III, who presents with post left kidney biopsy with significant costovertebral angle pain as well as hematuria with passage of clots. Patient received a non enhanced CT in the ED that showed hydroureter and hydronephrosis of the left kidney and ureter, respectively. This was most likely secondary to a hematoma in his distal left ureter post kidney biopsy. Patient was started on Flomax 0.8 mg and was treated with analgesics such as Morphine and hydromorphone as appropriate. Significant therapeutic interventions:   Tamsulosin 0.8mg    Significant Diagnostic Studies: CT Abdomen/Pelvis WO  Labs / Micro:    Radiology:    CT ABDOMEN PELVIS WO CONTRAST Additional Contrast? None    Result Date: 9/3/2022  EXAMINATION: CT OF THE ABDOMEN AND PELVIS WITHOUT CONTRAST 9/3/2022 10:47 am TECHNIQUE: CT of the abdomen and pelvis was performed without the administration of intravenous contrast. Multiplanar reformatted images are provided for review.  Automated exposure control, iterative reconstruction, and/or weight based adjustment of the mA/kV was utilized to reduce the radiation dose to as low as reasonably achievable. COMPARISON: 09/02/2022 HISTORY: ORDERING SYSTEM PROVIDED HISTORY: Evaluate for perinephric hematoma hydronephrosis TECHNOLOGIST PROVIDED HISTORY: Evaluate for perinephric hematoma hydronephrosis Reason for Exam: hematuria, lt flank pain Additional signs and symptoms: Evaluate for perinephric hematoma hydronephrosis FINDINGS: Lower Chest: Unremarkable Organs: The the liver, gallbladder, pancreas and spleen appear normal.  There is no significant residual left hydronephrosis. There is no perinephric hematoma. Some left perinephric stranding is noted, in keeping with the recent biopsy. The quadratus lumborum muscle on the left is unremarkable. The adrenal glands appear normal. GI/Bowel: There is a small hiatal hernia. Small bowel loops appear unremarkable. There is some diverticular disease of the sigmoid colon. There is some minimal free fluid noted in the pelvis. Pelvis: The bladder, prostate and rectum appear normal.  There is no pelvic lymphadenopathy. Peritoneum/Retroperitoneum: Unremarkable Bones/Soft Tissues: Unremarkable     Minimal residual left perinephric stranding without any evidence of hematoma. Left paraspinal musculature is unremarkable. No significant residual hydronephrosis. No residual gas noted in the left renal collecting system. Incidental note is made of a small hiatal hernia, and some mild diverticular disease of the sigmoid colon. CT ABDOMEN PELVIS WO CONTRAST Additional Contrast? None    Result Date: 9/2/2022  EXAMINATION: CT OF THE ABDOMEN AND PELVIS WITHOUT CONTRAST 9/2/2022 6:41 am TECHNIQUE: CT of the abdomen and pelvis was performed without the administration of intravenous contrast. Multiplanar reformatted images are provided for review. Automated exposure control, iterative reconstruction, and/or weight based adjustment of the mA/kV was utilized to reduce the radiation dose to as low as reasonably achievable.  COMPARISON: Renal ultrasound 07/23/2022 HISTORY: ORDERING SYSTEM PROVIDED HISTORY: left flank pain, left kidney biopsy yesterday, hx of CKD TECHNOLOGIST PROVIDED HISTORY: left flank pain, left kidney biopsy yesterday, hx of CKD Decision Support Exception - unselect if not a suspected or confirmed emergency medical condition->Emergency Medical Condition (MA) Reason for Exam: pt. c/o severe left flank pain post left kidney biopsy 9-1-2022 @2pm Relevant Medical/Surgical History: hx. of kidney issues per pt FINDINGS: Lower Chest: The visualized heart and lungs show no acute abnormalities. Organs: There is no nephrolithiasis. There is mild left hydronephrosis and hydroureter without evidence for radiopaque obstructing calculus. Mild increased density in the distal 2 cm also of the left ureter may be due to hematoma which may be causing a mild degree of obstruction and consequently the hydronephrosis/hydroureter. There are some pockets of gas in the renal calices attributed to the biopsy. . Liver, spleen, pancreas, adrenal glands and gallbladder show no acute process. GI/Bowel: There is limited evaluation due to absence of oral contrast.  Small hiatal hernia which is fluid-filled. Air/fluid failed moderately distended stomach. No bowel obstruction. No acute process evident in the GI tract Pelvis: Urinary bladder show small gas pocket attributed to the previous the procedure. No wall thickening or perivesical stranding. Peritoneum/Retroperitoneum: No free fluid no significant lymphadenopathy. Asymmetric enlargement of the left quadratus lumborum muscle at level of left kidney and extending inferiorly along with overlying mild hyperdense stranding fluid and minimal traces compatible with hematoma which may be attributed to the biopsy. There is no drainable fluid collection. Bones/Soft Tissues: No acute abnormality of the bones. The superficial soft tissues show no significant abnormalities. 1. Interval development of mild left hydronephrosis and hydroureter. Mild hyperdensity in the distal left ureter just proximal to the UVJ likely hematoma secondary to biopsy which may be coursing the mild obstructive signs. 2. Very minimal left perinephric fat stranding, some gas pockets within the left renal collecting system and a small gas pocket posterior to the left kidney compatible with post biopsy changes. 3. Asymmetric enlargement of left quadratus lumborum muscle with some overlying fat stranding and minimal hyperdense traces of fluid compatible with post biopsy hematoma. No significant amount of fluid or drainable fluid collection. IR BIOPSY KIDNEY PERCUTANEOUS    Result Date: 9/1/2022  PROCEDURE: IR BIOPSY KIDNEY PERCUTANEOUS 9/1/2022 HISTORY: ORDERING SYSTEM PROVIDED HISTORY: Benign hypertension with chronic kidney disease, stage III ([facility] [address] TECHNOLOGIST PROVIDED HISTORY: Which side should the procedure be performed?->Radiologist Recommendation renal insufficiency, proteinuria, microscopic hematuria CONTRAST: None used SEDATION: Fentanyl 100 mcg IV was administered for pain. Medication was provided and recorded by Radiology nurses. FLUOROSCOPY DOSE AND TYPE OR TIME AND EXPOSURES: None used DESCRIPTION OF PROCEDURE: Informed consent was obtained after a detailed explanation of the procedure including risks, benefits, and alternatives. Universal protocol was observed. Sterile gowns, masks, hats and gloves utilized for maximal sterile barrier. Ultrasound left kidney was performed and a site chosen for biopsy. Skin over the area was prepared in the usual fashion, as above, using all elements of maximal sterile barrier technique. Local anesthesia was administered with 1% lidocaine. Subsequently, using 20 gauge times 15 and 20 cm Corvocet biopsy guns/needles with coaxial technique, 6 passes were made into the lower pole cortex, 3 of which had adequate renal cortical tissue as determined by pathology on site.  Avitene was subsequently used to embolize the tract upon levocetirizine 5 MG tablet  Commonly known as: XYZAL  Take 1 tablet by mouth every morning     losartan 100 MG tablet  Commonly known as: Cozaar  Take 1 tablet by mouth daily     MELATIN PO     montelukast 10 MG tablet  Commonly known as: SINGULAIR  Take 1 tablet by mouth daily     pantoprazole 40 MG tablet  Commonly known as: PROTONIX  Take 1 tablet by mouth 2 times daily     sertraline 100 MG tablet  Commonly known as: ZOLOFT     sucralfate 1 GM tablet  Commonly known as: Carafate  Take 1 tablet by mouth 3 times daily     therapeutic multivitamin-minerals tablet     tiZANidine 2 MG tablet  Commonly known as: ZANAFLEX  Take 1 tablet by mouth nightly as needed (muscle tightness)     Vitamin D3 1.25 MG (66879 UT) Caps  take 1 capsule by mouth every week            STOP taking these medications      Flowflex COVID-19 Ag Home Test Kit  Generic drug: COVID-19 At Home Antigen Test               Where to Get Your Medications        These medications were sent to [address] [place] [id number]      Phone: [phone]   tamsulosin 0.4 MG capsule         Electronically signed by   Savannah Conway MD  9/4/2022  2:11 PM      Thank you Dr. Tamela Ibarra MD for the opportunity to be involved in this patient's care. Attending Physician Statement  I have discussed the care of Payton Walsh and I have examined the patient myselft and taken ros and hpi , including pertinent history and exam findings,  with the resident. I have reviewed the key elements of all parts of the encounter with the resident. I agree with the assessment, plan and orders as documented by the resident. I spent approx 35 mins in direct patient care as above and discussing discharge with patient, reviewing medications and counseling for discharge .     Electronically signed by Hugo Albarado MD

## 2022-09-04 NOTE — PROGRESS NOTES
[address]    PROGRESS NOTE             9/4/2022    7:35 AM    Name:   Kika Galeana  MRN:     [id number]     Acct:      [de-identified]   Room:   2051/2051-01  IP Day:  0  Admit Date:  9/2/2022  6:07 AM    PCP:  Houston Moore MD  Code Status:  Full Code    Subjective:     C/C:   Chief Complaint   Patient presents with    Flank Pain     Interval History Status: significantly improved. Patient seen and examined at the bed side, no new acute events overnight, creatinine at 1.76 significantly improving. Patient had no episodes of acute pain. However, biopsy site tenderness remains at 2/10  On repeated CT scan yesterday patient showed resolution of hydronephrosis and hydroureter. Patient plans discharge today      Notes from nursing staff and Consults had been reviewed, and the overnight progress had been checked with the nursing staff as well. Brief History:     The patient is a 32 y.o. Non- / non  male with history of hypertension and chronic kidney disease grade III who presents withFlank Pain post left kidney biopsy that was done on 09/01/2022 and is admitted to the hospital for the management of flank pain and ureteral hematoma. Mr. Khushi Masterson, has history of chronic kidney disease grade 3, and hypertension on Labetalol, Losartan and amlodipine. He underwent left kidney biopsy at  for further investigations for CKD with proteinuria and hematuria. Since last night patient started having excruciating pain in his left flank along with hematuria and clot passage. He said that his pain was colicky in nature and extremely severe 10/10. Pain was associated with nausea but no vomiting. Patient also says that the passage of clots was not painful but actually improved his left sided flank pain.    He underwent CT on arrival for abdomen and pelvis without contrast that showed obstruction of the left distal ureter along with some hydronephrosis, possibly secondary to clot formation/hematoma post the aforementioned procedure. Patient was then given Dilaudid at the ED which made his pain improve significantly. He has then received Tamsulosin 0.8 mg. While I was in the patient's room examining him, he showed me a clot that was just passed, perhaps measuring around 1-1.5 cm    Review of Systems:     CONSTITUTIONAL:  no fevers  Psych: Normal mood and affect, no depression, no suicidal ideation. EYES: negative for blury vision  HEENT: No headaches, no nasal congestion, no difficulty swallowing  RESPIRATORY:negative for dyspnea, no wheezing, no Cough  CARDIOVASCULAR: negative for chest pain, no palpitations  GASTROINTESTINAL: no nausea, no vomiting, no change in bowel habits, no abdominal pain   GENITOURINARY: negative for dysuria, no hematuria   MUSCULOSKELETAL: no joint pains, no muscle aches, no swelling of joints or extremities  NEUROLOGICAL: No weakness or numbness      Medications:      Allergies:    No Known Allergies    Current Meds:   Scheduled Meds:    polyethylene glycol  17 g Oral Daily    sodium chloride flush  5-40 mL IntraVENous 2 times per day    tamsulosin  0.8 mg Oral Daily    amitriptyline  50 mg Oral Nightly    amLODIPine  10 mg Oral Daily    atorvastatin  20 mg Oral Nightly    labetalol  200 mg Oral BID    losartan  100 mg Oral Daily    montelukast  10 mg Oral Daily    pantoprazole  40 mg Oral BID    melatonin  10.5 mg Oral Nightly    cetirizine  5 mg Oral Daily    [START ON 9/6/2022] vitamin D  50,000 Units Oral Weekly    sertraline  200 mg Oral Daily     Continuous Infusions:    sodium chloride 100 mL/hr at 09/02/22 2222    sodium chloride       PRN Meds: oxyCODONE-acetaminophen, sodium chloride flush, sodium chloride, polyethylene glycol, acetaminophen **OR** acetaminophen, magnesium sulfate, potassium chloride **OR** potassium alternative oral replacement **OR** potassium chloride, ondansetron **OR** ondansetron    Data:     Past Medical History:   has a past medical history of Abnormal EKG, Anxiety, History of chicken pox, Hyperlipidemia, Hypertension, OCD (obsessive compulsive disorder), and Stage 3a chronic kidney disease ([address] Social History:   reports that he has been smoking cigars. He has never used smokeless tobacco. He reports current alcohol use. He reports that he does not use drugs. Family History:   Family History   Problem Relation Age of Onset    Heart Disease Mother     Sleep Apnea Mother     Sleep Apnea Father        Vitals:  BP (!) 140/97   Pulse 70   Temp 97.9 °F (36.6 °C) (Oral)   Resp 18   Ht 5' 11\" (1.803 m)   Wt 202 lb 13.2 oz (92 kg)   SpO2 99%   BMI 28.29 kg/m²   Temp (24hrs), Av °F (36.7 °C), Min:97.9 °F (36.6 °C), Max:98.1 °F (36.7 °C)      No results for input(s): POCGLU in the last 72 hours. I/O(24Hr): Intake/Output Summary (Last 24 hours) at 2022 0735  Last data filed at 2022 0700  Gross per 24 hour   Intake 2380 ml   Output --   Net 2380 ml       Labs:        Lab Results   Component Value Date/Time    SPECIAL NOT REPORTED 2020 02:59 PM     Lab Results   Component Value Date/Time    CULTURE NO GROWTH 2022 07:27 AM         Radiology:    CT ABDOMEN PELVIS WO CONTRAST Additional Contrast? None    Result Date: 9/3/2022  EXAMINATION: CT OF THE ABDOMEN AND PELVIS WITHOUT CONTRAST 9/3/2022 10:47 am TECHNIQUE: CT of the abdomen and pelvis was performed without the administration of intravenous contrast. Multiplanar reformatted images are provided for review. Automated exposure control, iterative reconstruction, and/or weight based adjustment of the mA/kV was utilized to reduce the radiation dose to as low as reasonably achievable.  COMPARISON: 2022 HISTORY: ORDERING SYSTEM PROVIDED HISTORY: Evaluate for perinephric hematoma hydronephrosis TECHNOLOGIST PROVIDED HISTORY: Evaluate for perinephric hematoma hydronephrosis Reason for Exam: hematuria, lt flank pain Additional signs and symptoms: Evaluate for perinephric hematoma hydronephrosis FINDINGS: Lower Chest: Unremarkable Organs: The the liver, gallbladder, pancreas and spleen appear normal.  There is no significant residual left hydronephrosis. There is no perinephric hematoma. Some left perinephric stranding is noted, in keeping with the recent biopsy. The quadratus lumborum muscle on the left is unremarkable. The adrenal glands appear normal. GI/Bowel: There is a small hiatal hernia. Small bowel loops appear unremarkable. There is some diverticular disease of the sigmoid colon. There is some minimal free fluid noted in the pelvis. Pelvis: The bladder, prostate and rectum appear normal.  There is no pelvic lymphadenopathy. Peritoneum/Retroperitoneum: Unremarkable Bones/Soft Tissues: Unremarkable     Minimal residual left perinephric stranding without any evidence of hematoma. Left paraspinal musculature is unremarkable. No significant residual hydronephrosis. No residual gas noted in the left renal collecting system. Incidental note is made of a small hiatal hernia, and some mild diverticular disease of the sigmoid colon. CT ABDOMEN PELVIS WO CONTRAST Additional Contrast? None    Result Date: 9/2/2022  EXAMINATION: CT OF THE ABDOMEN AND PELVIS WITHOUT CONTRAST 9/2/2022 6:41 am TECHNIQUE: CT of the abdomen and pelvis was performed without the administration of intravenous contrast. Multiplanar reformatted images are provided for review. Automated exposure control, iterative reconstruction, and/or weight based adjustment of the mA/kV was utilized to reduce the radiation dose to as low as reasonably achievable.  COMPARISON: Renal ultrasound 07/23/2022 HISTORY: ORDERING SYSTEM PROVIDED HISTORY: left flank pain, left kidney biopsy yesterday, hx of CKD TECHNOLOGIST PROVIDED HISTORY: left flank pain, left kidney biopsy yesterday, hx of CKD Decision Support Exception - unselect if not a suspected or confirmed emergency medical condition->Emergency Medical Condition (MA) Reason for Exam: pt. c/o severe left flank pain post left kidney biopsy 9-1-2022 @2pm Relevant Medical/Surgical History: hx. of kidney issues per pt FINDINGS: Lower Chest: The visualized heart and lungs show no acute abnormalities. Organs: There is no nephrolithiasis. There is mild left hydronephrosis and hydroureter without evidence for radiopaque obstructing calculus. Mild increased density in the distal 2 cm also of the left ureter may be due to hematoma which may be causing a mild degree of obstruction and consequently the hydronephrosis/hydroureter. There are some pockets of gas in the renal calices attributed to the biopsy. . Liver, spleen, pancreas, adrenal glands and gallbladder show no acute process. GI/Bowel: There is limited evaluation due to absence of oral contrast.  Small hiatal hernia which is fluid-filled. Air/fluid failed moderately distended stomach. No bowel obstruction. No acute process evident in the GI tract Pelvis: Urinary bladder show small gas pocket attributed to the previous the procedure. No wall thickening or perivesical stranding. Peritoneum/Retroperitoneum: No free fluid no significant lymphadenopathy. Asymmetric enlargement of the left quadratus lumborum muscle at level of left kidney and extending inferiorly along with overlying mild hyperdense stranding fluid and minimal traces compatible with hematoma which may be attributed to the biopsy. There is no drainable fluid collection. Bones/Soft Tissues: No acute abnormality of the bones. The superficial soft tissues show no significant abnormalities. 1. Interval development of mild left hydronephrosis and hydroureter. Mild hyperdensity in the distal left ureter just proximal to the UVJ likely hematoma secondary to biopsy which may be coursing the mild obstructive signs.  2. Very minimal left perinephric fat stranding, some gas pockets within the left renal collecting system and a small gas pocket posterior to the left kidney compatible with post biopsy changes. 3. Asymmetric enlargement of left quadratus lumborum muscle with some overlying fat stranding and minimal hyperdense traces of fluid compatible with post biopsy hematoma. No significant amount of fluid or drainable fluid collection. IR BIOPSY KIDNEY PERCUTANEOUS    Result Date: 9/1/2022  PROCEDURE: IR BIOPSY KIDNEY PERCUTANEOUS 9/1/2022 HISTORY: ORDERING SYSTEM PROVIDED HISTORY: Benign hypertension with chronic kidney disease, stage III ([address] TECHNOLOGIST PROVIDED HISTORY: Which side should the procedure be performed?->Radiologist Recommendation renal insufficiency, proteinuria, microscopic hematuria CONTRAST: None used SEDATION: Fentanyl 100 mcg IV was administered for pain. Medication was provided and recorded by Radiology nurses. FLUOROSCOPY DOSE AND TYPE OR TIME AND EXPOSURES: None used DESCRIPTION OF PROCEDURE: Informed consent was obtained after a detailed explanation of the procedure including risks, benefits, and alternatives. Universal protocol was observed. Sterile gowns, masks, hats and gloves utilized for maximal sterile barrier. Ultrasound left kidney was performed and a site chosen for biopsy. Skin over the area was prepared in the usual fashion, as above, using all elements of maximal sterile barrier technique. Local anesthesia was administered with 1% lidocaine. Subsequently, using 20 gauge times 15 and 20 cm CorvoDigitalChalkt biopsy guns/needles with coaxial technique, 6 passes were made into the lower pole cortex, 3 of which had adequate renal cortical tissue as determined by pathology on site. Avitene was subsequently used to embolize the tract upon removal of the coaxial needle. The patient tolerated procedure well. No immediate complication. FINDINGS: Satisfactory needle position in the lower pole the left kidney demonstrated on multiple sonographic views.   Linear echogenic material seen along the tract after embolization. No hematoma detected. Successful core biopsy lower pole left kidney, with results as above. EKG:        Physical Examination:        PHYSICAL EXAM:  General Appearance  Alert , awake, not in acute distress  Psych: Normal mood and affect, normal behavior, not agitated, maintaining good eye contact   HEENT - Head is normocephalic, atraumatic. Neck: supple, no rigidity, normal ROM, no neck swellings, normal thyroid gland  Lungs - Bilateral equal air entry, no wheezes, rales or rhonchi, aeration good  Cardiovascular - Heart sounds are normal.  Regular rhythm, normal rate without murmur, gallop or rub. Abdomen -  costovertebral angle tenderness is mild compared to presentation  Neurologic - There are no new focal motor or sensory deficits  Skin - No bruising or bleeding on exposed skin area  Extremities - No cyanosis, clubbing or edema      Assessment:        Primary Problem  Left flank pain    Active Hospital Problems    Diagnosis Date Noted    Left flank pain [R10.9] 09/02/2022     Priority: Medium     This a 33 YO male patient with history of hypertension on multidrug, and worsening CKD stage III, who presents with post left kidney biopsy with significant costovertebral angle pain as well as hematuria with passage of clots. Patient received a non enhanced CT in the ED that showed hydroureter and hydronephrosis of the left kidney and ureter, respectively. This was most likely secondary to a hematoma in his distal left ureter post kidney biopsy. Patient was started on Flomax 0.8 mg and was treated with analgesics such as Morphine and hydromorphone as appropriate.      Plan:        Patient status Admit as inpatient in the  Med/Surge     Left flank pain, secondary to obstructive Hydroureter and hydronephrosis  -Urology on board  -On admission CT Abdomen/pelvis WO showed left hydroureter and mild hydronephrosis secondary to obstructive hematoma/clot in the distal ureter  -Repeat CT scan showed resolution of her ureteral hydronephrosis  -Patient on 100 ml/hr NS  -Flomax 0.8 mg was initiated by Urology.  -Percocet 1 tablet every 4 hours as needed for pain        2. Chronic kidney disease  -Nephrology on board  -Creatinine on presenation was 2.61, aug 2022 cr was 2.3, currently creatinine at 1.76       3. Hypertension  -Continue Losartan 100 mg Q24H  -Continue Labetalol 200 mg BID  -Continue Amlodipine 10 mg Q24H     4. Depression  -Continue Sertraline 200mg Daily  -Continue Amitriptyline 50mg      5.  Continue patient on Lipitor home dose    DVT prophylaxis: Lovenox 40 mg SC  GI prophylaxis: Protonix 40 mg daily    Niru Lawrence MD  9/4/2022  7:35 AM

## 2022-09-04 NOTE — PROGRESS NOTES
SPECIAL PROCEDURES    UPPER GASTROINTESTINAL ENDOSCOPY N/A 9/2/2020    EGD BIOPSY performed by Demario Hale MD at [facility]       Current Medications:    oxyCODONE-acetaminophen (PERCOCET) 5-325 MG per tablet 1 tablet, Q4H PRN  polyethylene glycol (GLYCOLAX) packet 17 g, Daily  0.9 % sodium chloride infusion, Continuous  sodium chloride flush 0.9 % injection 5-40 mL, 2 times per day  sodium chloride flush 0.9 % injection 5-40 mL, PRN  0.9 % sodium chloride infusion, PRN  polyethylene glycol (GLYCOLAX) packet 17 g, Daily PRN  acetaminophen (TYLENOL) tablet 650 mg, Q6H PRN   Or  acetaminophen (TYLENOL) suppository 650 mg, Q6H PRN  tamsulosin (FLOMAX) capsule 0.8 mg, Daily  magnesium sulfate 1000 mg in dextrose 5% 100 mL IVPB, PRN  potassium chloride (KLOR-CON M) extended release tablet 40 mEq, PRN   Or  potassium bicarb-citric acid (EFFER-K) effervescent tablet 40 mEq, PRN   Or  potassium chloride 10 mEq/100 mL IVPB (Peripheral Line), PRN  amitriptyline (ELAVIL) tablet 50 mg, Nightly  amLODIPine (NORVASC) tablet 10 mg, Daily  atorvastatin (LIPITOR) tablet 20 mg, Nightly  labetalol (NORMODYNE) tablet 200 mg, BID  losartan (COZAAR) tablet 100 mg, Daily  montelukast (SINGULAIR) tablet 10 mg, Daily  pantoprazole (PROTONIX) tablet 40 mg, BID  melatonin tablet 10.5 mg, Nightly  cetirizine (ZYRTEC) tablet 5 mg, Daily  [START ON 9/6/2022] vitamin D (ERGOCALCIFEROL) capsule 50,000 Units, Weekly  ondansetron (ZOFRAN-ODT) disintegrating tablet 4 mg, Q8H PRN   Or  ondansetron (ZOFRAN) injection 4 mg, Q6H PRN  sertraline (ZOLOFT) tablet 200 mg, Daily      Allergies:  Patient has no known allergies.     Social History:   Social History     Socioeconomic History    Marital status: Single     Spouse name: Not on file    Number of children: Not on file    Years of education: Not on file    Highest education level: Not on file   Occupational History    Not on file   Tobacco Use    Smoking status: Some Days     Types: Cigars Smokeless tobacco: Never    Tobacco comments:     Occasional cigar with relatives (major holidays). Vaping Use    Vaping Use: Never used   Substance and Sexual Activity    Alcohol use: Yes     Comment: socially- not weekly, states monthly    Drug use: No    Sexual activity: Not on file   Other Topics Concern    Not on file   Social History Narrative    Not on file     Social Determinants of Health     Financial Resource Strain: Low Risk     Difficulty of Paying Living Expenses: Not hard at all   Food Insecurity: No Food Insecurity    Worried About Running Out of Food in the Last Year: Never true    Ran Out of Food in the Last Year: Never true   Transportation Needs: Not on file   Physical Activity: Not on file   Stress: Not on file   Social Connections: Not on file   Intimate Partner Violence: Not on file   Housing Stability: Not on file           Objective:  CURRENT TEMPERATURE:  Temp: 97.9 °F (36.6 °C)  MAXIMUM TEMPERATURE OVER 24HRS:  Temp (24hrs), Av °F (36.7 °C), Min:97.9 °F (36.6 °C), Max:98.1 °F (36.7 °C)    CURRENT RESPIRATORY RATE:  Resp: 16  CURRENT PULSE:  Heart Rate: 86  CURRENT BLOOD PRESSURE:  BP: 126/78  24HR BLOOD PRESSURE RANGE:  Systolic (34TFG), XLN:674 , Min:125 , EVD:186   ; Diastolic (23FVQ), BGN:71, Min:74, Max:97    24HR INTAKE/OUTPUT:    Intake/Output Summary (Last 24 hours) at 2022 1206  Last data filed at 2022 1157  Gross per 24 hour   Intake 2380 ml   Output 1225 ml   Net 1155 ml     Patient Vitals for the past 96 hrs (Last 3 readings):   Weight   22 0937 202 lb 13.2 oz (92 kg)   22 0602 205 lb (93 kg)       Physical Exam:  GENERAL APPEARANCE: Alert and cooperative, and appears to be in no acute distress. HEAD: normocephalic  EYES:  EOMI. Not pale, anicteric   NOSE:  No nasal discharge. THROAT:  Oral cavity and pharynx normal. Moist  CARDIAC: Normal S1 and S2. No S3, S4 or murmurs. Rhythm is regular.   LUNGS: Normal respiratory effort no accessory muscle use  NECK: Neck supple, non-tender   BACK: Examination of the spine reveals normal gait and posture, no spinal deformity, symmetry of spinal muscles, without tenderness, decreased range of motion or muscular spasm  MUSKULOSKELETAL: Adequately aligned spine. No joint erythema or tenderness. EXTREMITIES: No edema. Peripheral pulses intact. NEURO: Nonfocal      Labs:   CBC:  Recent Labs     09/02/22  0653 09/03/22  0527 09/04/22  0614   WBC 10.2 4.8 4.9   RBC 4.50 3.61* 3.97*   HGB 13.2* 10.7* 11.8*   HCT 37.6* 31.1* 33.1*   MCV 83.7 86.3 83.6   MCH 29.4 29.6 29.7   MCHC 35.2 34.3 35.6   RDW 12.1 12.1 12.2    169 187   MPV 6.5 6.5 6.1      BMP:   Recent Labs     09/02/22  0653 09/03/22  0527 09/04/22  0614    138 137   K 3.9 4.3 4.2    106 101   CO2 23 26 28   BUN 27* 19 18   CREATININE 2.61* 1.89* 1.76*   GLUCOSE 106* 85 81   CALCIUM 10.1 9.0 10.0      Phosphorus:  No results for input(s): PHOS in the last 72 hours. Magnesium: No results for input(s): MG in the last 72 hours. Albumin:   Recent Labs     09/02/22  0653   LABALBU 4.5       IRON:  No results for input(s): IRON in the last 72 hours. Iron Saturation:  Invalid input(s): PERCENTFE  TIBC:  No results for input(s): TIBC in the last 72 hours. FERRITIN:  No results for input(s): FERRITIN in the last 72 hours. SPEP: No results for input(s): SPEP in the last 72 hours.    Recent Labs     09/02/22  0653   PROT 7.1       Urinalysis:    Recent Labs     09/02/22  0727   NITRU NEGATIVE   COLORU Yellow   PHUR 6.5   WBCUA 0 TO 2   RBCUA 51    BACTERIA None   SPECGRAV 1.013   LEUKOCYTESUR NEGATIVE   UROBILINOGEN Normal   BILIRUBINUR NEGATIVE   GLUCOSEU NEGATIVE   KETUA NEGATIVE             Assessment:  CKD stage III with baseline creatinine of 2.0 to 2.3 mg/dL, diagnosis unknown status post kidney biopsy yesterday for hematuria and proteinuria, some nephrotic proteinuria with 24-hour urine protein 400 mg, LO equivocal C3-C4 normal antidouble-stranded DNA slightly positive kappa lambda ratio slightly elevated 1.6 electrophoresis immunofixation, normal electrophoretic patterns, other differential diagnosis includes interstitial nephritis secondary to PPIs, biopsy results are pending. Serum creatinine improved 1.7 mg/dL nonoliguric  Essential hypertension  Left flank pain left hydronephrosis most likely secondary to clots secondary to kidney biopsy causing obstruction at the left UVJ junction          Plan:    Continue blood pressure medications  Follow-up kidney biopsy OUT PATIENT    no OBJECTIONS ON DISCHARGE      Discharge planning in progress    Thank you for the consultation.       Electronically signed by Ricardo Garrison MD on 9/4/2022 at 12:06 PM

## 2022-09-04 NOTE — PROGRESS NOTES
[facility]   Urology Progress Note            Subjective:  Follow-up ureteral colic hydronephrosis    Patient Vitals for the past 24 hrs:   BP Temp Temp src Pulse Resp SpO2   09/04/22 1145 126/78 97.9 °F (36.6 °C) Oral 86 16 99 %   09/04/22 0906 -- -- -- 70 -- --   09/04/22 0705 (!) 143/97 -- -- -- -- --   09/04/22 0700 (!) 140/97 97.9 °F (36.6 °C) Oral 70 16 99 %   09/03/22 2349 132/86 98.1 °F (36.7 °C) Oral 70 18 99 %   09/03/22 2156 -- -- -- -- 16 --   09/03/22 1806 125/74 98 °F (36.7 °C) Oral 81 18 99 %       Intake/Output Summary (Last 24 hours) at 9/4/2022 1331  Last data filed at 9/4/2022 1240  Gross per 24 hour   Intake 2380 ml   Output 1475 ml   Net 905 ml       Recent Labs     09/02/22  0653 09/03/22  0527 09/04/22  0614   WBC 10.2 4.8 4.9   HGB 13.2* 10.7* 11.8*   HCT 37.6* 31.1* 33.1*   MCV 83.7 86.3 83.6    169 187     Recent Labs     09/02/22  0653 09/03/22  0527 09/04/22  0614    138 137   K 3.9 4.3 4.2    106 101   CO2 23 26 28   BUN 27* 19 18   CREATININE 2.61* 1.89* 1.76*       Recent Labs     09/02/22  0727   COLORU Yellow   PHUR 6.5   WBCUA 0 TO 2   RBCUA 51    BACTERIA None   SPECGRAV 1.013   LEUKOCYTESUR NEGATIVE   UROBILINOGEN Normal   BILIRUBINUR NEGATIVE       Additional Lab/culture results:    Physical Exam: Patient seen in conjunction with nursing staff patient is alert oriented not in acute distress renal function continues to improve resolution of hydronephrosis based on CT imaging    Interval Imaging Findings:    Impression:    Patient Active Problem List   Diagnosis    Essential hypertension    Other hyperlipidemia    Generalized anxiety disorder    Major depressive disorder, recurrent episode, moderate (HCC)    Stage 3a chronic kidney disease ([address]    Left flank pain       Plan: Discharge planning when cleared by nephrology I will see the patient for follow-up at the office in 3 weeks discussed with patient and nursing staff    Janie Rogers MD  1:31 PM 9/4/2022

## 2022-09-04 NOTE — DISCHARGE INSTR - DIET

## 2022-09-06 LAB — SURGICAL PATHOLOGY REPORT: NORMAL

## 2022-10-06 ENCOUNTER — HOSPITAL ENCOUNTER (OUTPATIENT)
Age: 27
Discharge: HOME | End: 2022-10-06
Payer: COMMERCIAL

## 2022-10-06 DIAGNOSIS — N18.31 STAGE 3A CHRONIC KIDNEY DISEASE (HCC): ICD-10-CM

## 2022-10-06 DIAGNOSIS — N18.31 ANEMIA IN STAGE 3A CHRONIC KIDNEY DISEASE (HCC): ICD-10-CM

## 2022-10-06 DIAGNOSIS — I12.9 BENIGN HYPERTENSION WITH CHRONIC KIDNEY DISEASE, STAGE III (HCC): ICD-10-CM

## 2022-10-06 DIAGNOSIS — D63.1 ANEMIA IN STAGE 3A CHRONIC KIDNEY DISEASE (HCC): ICD-10-CM

## 2022-10-06 DIAGNOSIS — N18.30 BENIGN HYPERTENSION WITH CHRONIC KIDNEY DISEASE, STAGE III (HCC): ICD-10-CM

## 2022-10-06 LAB
ANION GAP SERPL CALCULATED.3IONS-SCNC: 11 MMOL/L (ref 9–17)
BUN BLDV-MCNC: 19 MG/DL (ref 6–20)
CALCIUM SERPL-MCNC: 10 MG/DL (ref 8.6–10.4)
CHLORIDE BLD-SCNC: 97 MMOL/L (ref 98–107)
CO2: 28 MMOL/L (ref 20–31)
COMPLEMENT C3: 113 MG/DL (ref 90–180)
COMPLEMENT C4: 34 MG/DL (ref 10–40)
CREAT SERPL-MCNC: 2.37 MG/DL (ref 0.7–1.2)
CREATININE URINE: 100.4 MG/DL (ref 39–259)
GFR SERPL CREATININE-BSD FRML MDRD: 38 ML/MIN/1.73M2
GLUCOSE BLD-MCNC: 79 MG/DL (ref 70–99)
POTASSIUM SERPL-SCNC: 3.9 MMOL/L (ref 3.7–5.3)
SODIUM BLD-SCNC: 136 MMOL/L (ref 135–144)
TOTAL PROTEIN, URINE: 41 MG/DL
URINE TOTAL PROTEIN CREATININE RATIO: 0.41 (ref 0–0.2)

## 2022-10-06 PROCEDURE — 86038 ANTINUCLEAR ANTIBODIES: CPT

## 2022-10-06 PROCEDURE — 82570 ASSAY OF URINE CREATININE: CPT

## 2022-10-06 PROCEDURE — 86162 COMPLEMENT TOTAL (CH50): CPT

## 2022-10-06 PROCEDURE — 86235 NUCLEAR ANTIGEN ANTIBODY: CPT

## 2022-10-06 PROCEDURE — 86225 DNA ANTIBODY NATIVE: CPT

## 2022-10-06 PROCEDURE — 80048 BASIC METABOLIC PNL TOTAL CA: CPT

## 2022-10-06 PROCEDURE — 36415 COLL VENOUS BLD VENIPUNCTURE: CPT

## 2022-10-06 PROCEDURE — 86160 COMPLEMENT ANTIGEN: CPT

## 2022-10-06 PROCEDURE — 84156 ASSAY OF PROTEIN URINE: CPT

## 2022-10-11 LAB
ANTI DNA DOUBLE STRANDED: 21 IU/ML
ANTI-NUCLEAR ANTIBODY (ANA): POSITIVE
ANTI-SMITH: 2 U/ML
COMPLEMENT TOTAL (CH50): 63.2 U/ML (ref 38.7–89.9)
ENA ANTIBODIES SCREEN: 0.3 U/ML

## 2022-10-13 LAB
ANTI JO-1 IGG: <0.4 U/ML
ANTI SSA: <0.3 U/ML
ANTI SSB: <0.3 U/ML
ANTI-CENTROMERE: 0.9 U/ML
ANTI-SCLERODERMA: 0.8 U/ML
ANTI-U1RNP: 1.4 U/ML

## 2022-10-31 RX ORDER — TAMSULOSIN HYDROCHLORIDE 0.4 MG/1
CAPSULE ORAL
Qty: 30 CAPSULE | Refills: 3 | OUTPATIENT
Start: 2022-10-31

## 2022-12-01 ENCOUNTER — E-VISIT (OUTPATIENT)
Dept: PRIMARY CARE CLINIC | Age: 27
End: 2022-12-01
Payer: COMMERCIAL

## 2022-12-01 DIAGNOSIS — J06.9 VIRAL UPPER RESPIRATORY TRACT INFECTION: Primary | ICD-10-CM

## 2022-12-01 PROCEDURE — 99422 OL DIG E/M SVC 11-20 MIN: CPT | Performed by: NURSE PRACTITIONER

## 2022-12-01 ASSESSMENT — LIFESTYLE VARIABLES: SMOKING_STATUS: NO, I'VE NEVER SMOKED

## 2022-12-01 NOTE — PROGRESS NOTES
Abbott Hacker (1995) initiated an asynchronous digital communication through [address] [place] [address]. HPI: per patient questionnaire     Exam: not applicable    Diagnoses and all orders for this visit:  Diagnoses and all orders for this visit:    Viral upper respiratory tract infection  Sx started 2 days ago. Likely viral vs allergy. Recommend supportive care measures for at least 7-10 days. Supportive care examples sent to patient. Recommend fu with pcp       Time: EV2 - 11-20 minutes were spent on the digital evaluation and management of this patient.  16 min     OLIVE Lomas - CNP

## 2022-12-02 ENCOUNTER — E-VISIT (OUTPATIENT)
Dept: PRIMARY CARE CLINIC | Age: 27
End: 2022-12-02
Payer: COMMERCIAL

## 2022-12-02 DIAGNOSIS — J06.9 VIRAL UPPER RESPIRATORY TRACT INFECTION: Primary | ICD-10-CM

## 2022-12-02 PROCEDURE — 99423 OL DIG E/M SVC 21+ MIN: CPT | Performed by: NURSE PRACTITIONER

## 2022-12-02 RX ORDER — AZITHROMYCIN 250 MG/1
TABLET, FILM COATED ORAL
Qty: 1 PACKET | Refills: 0 | Status: SHIPPED | OUTPATIENT
Start: 2022-12-02 | End: 2022-12-12

## 2022-12-02 ASSESSMENT — LIFESTYLE VARIABLES: SMOKING_STATUS: NO, I'VE NEVER SMOKED

## 2022-12-02 NOTE — PROGRESS NOTES
[employer] (1995) initiated an asynchronous digital communication through [address] [place] [address]. HPI: per patient questionnaire     Exam: not applicable    Diagnoses and all orders for this visit:  Diagnoses and all orders for this visit:    Viral upper respiratory tract infection  -     azithromycin (ZITHROMAX) 250 MG tablet; 2 tablets now then 1 daily until gone. Submitted e visit yesterday with 2 days of sx  New e visit today with 4-5 days of sx and change In drainage. Dad is sick with similar sx and treated with antibiotics  Supportive care measures were provided yesterday  Encouraged f/u with pcp     Time: EV3 - 21 or more minutes were spent on the digital evaluation and management of this patient. 22 min     OLIVE Vega - CNP

## 2022-12-29 ENCOUNTER — HOSPITAL ENCOUNTER (OUTPATIENT)
Age: 27
Setting detail: SPECIMEN
Discharge: HOME | End: 2022-12-29
Payer: COMMERCIAL

## 2022-12-29 DIAGNOSIS — I12.9 BENIGN HYPERTENSION WITH CHRONIC KIDNEY DISEASE, STAGE III (HCC): ICD-10-CM

## 2022-12-29 DIAGNOSIS — N18.31 STAGE 3A CHRONIC KIDNEY DISEASE (HCC): ICD-10-CM

## 2022-12-29 DIAGNOSIS — R80.9 PROTEINURIA, UNSPECIFIED TYPE: ICD-10-CM

## 2022-12-29 DIAGNOSIS — N18.32 STAGE 3B CHRONIC KIDNEY DISEASE (HCC): ICD-10-CM

## 2022-12-29 DIAGNOSIS — R31.29 OTHER MICROSCOPIC HEMATURIA: ICD-10-CM

## 2022-12-29 DIAGNOSIS — N18.30 BENIGN HYPERTENSION WITH CHRONIC KIDNEY DISEASE, STAGE III (HCC): ICD-10-CM

## 2022-12-29 DIAGNOSIS — N18.31 ANEMIA IN STAGE 3A CHRONIC KIDNEY DISEASE (HCC): ICD-10-CM

## 2022-12-29 DIAGNOSIS — D63.1 ANEMIA IN STAGE 3A CHRONIC KIDNEY DISEASE (HCC): ICD-10-CM

## 2022-12-29 LAB
ABSOLUTE EOS #: 0.2 K/UL (ref 0–0.4)
ABSOLUTE LYMPH #: 1.5 K/UL (ref 1–4.8)
ABSOLUTE MONO #: 1 K/UL (ref 0.1–1.3)
ALBUMIN SERPL-MCNC: 4.1 G/DL (ref 3.5–5.2)
ALP BLD-CCNC: 99 U/L (ref 40–129)
ALT SERPL-CCNC: 19 U/L (ref 5–41)
ANION GAP SERPL CALCULATED.3IONS-SCNC: 15 MMOL/L (ref 9–17)
AST SERPL-CCNC: 20 U/L
BACTERIA: ABNORMAL
BASOPHILS # BLD: 1 % (ref 0–2)
BASOPHILS ABSOLUTE: 0 K/UL (ref 0–0.2)
BILIRUB SERPL-MCNC: 0.3 MG/DL (ref 0.3–1.2)
BILIRUBIN URINE: NEGATIVE
BUN BLDV-MCNC: 32 MG/DL (ref 6–20)
CALCIUM SERPL-MCNC: 8.8 MG/DL (ref 8.6–10.4)
CASTS UA: ABNORMAL /LPF
CHLORIDE BLD-SCNC: 97 MMOL/L (ref 98–107)
CO2: 24 MMOL/L (ref 20–31)
COLOR: YELLOW
CREAT SERPL-MCNC: 2.31 MG/DL (ref 0.7–1.2)
CREATININE URINE: 132.9 MG/DL (ref 39–259)
EOSINOPHILS RELATIVE PERCENT: 3 % (ref 0–4)
EPITHELIAL CELLS UA: ABNORMAL /HPF
GFR SERPL CREATININE-BSD FRML MDRD: 39 ML/MIN/1.73M2
GLUCOSE BLD-MCNC: 125 MG/DL (ref 70–99)
GLUCOSE URINE: NEGATIVE
HCT VFR BLD CALC: 36.3 % (ref 41–53)
HEMOGLOBIN: 13.1 G/DL (ref 13.5–17.5)
KETONES, URINE: NEGATIVE
LEUKOCYTE ESTERASE, URINE: NEGATIVE
LYMPHOCYTES # BLD: 20 % (ref 24–44)
MAGNESIUM: 1.8 MG/DL (ref 1.6–2.6)
MCH RBC QN AUTO: 30.8 PG (ref 26–34)
MCHC RBC AUTO-ENTMCNC: 36.2 G/DL (ref 31–37)
MCV RBC AUTO: 85.1 FL (ref 80–100)
MONOCYTES # BLD: 14 % (ref 1–7)
NITRITE, URINE: NEGATIVE
PDW BLD-RTO: 11.9 % (ref 11.5–14.9)
PH UA: 6 (ref 5–8)
PHOSPHORUS: 3.8 MG/DL (ref 2.5–4.5)
PLATELET # BLD: 208 K/UL (ref 150–450)
PMV BLD AUTO: 6.3 FL (ref 6–12)
POTASSIUM SERPL-SCNC: 3.9 MMOL/L (ref 3.7–5.3)
PROTEIN UA: ABNORMAL
RBC # BLD: 4.27 M/UL (ref 4.5–5.9)
RBC UA: ABNORMAL /HPF
SEG NEUTROPHILS: 62 % (ref 36–66)
SEGMENTED NEUTROPHILS ABSOLUTE COUNT: 4.6 K/UL (ref 1.3–9.1)
SODIUM BLD-SCNC: 136 MMOL/L (ref 135–144)
SPECIFIC GRAVITY UA: 1.01 (ref 1–1.03)
TOTAL PROTEIN, URINE: 53 MG/DL
TOTAL PROTEIN: 6.8 G/DL (ref 6.4–8.3)
TURBIDITY: CLEAR
URINE HGB: ABNORMAL
URINE TOTAL PROTEIN CREATININE RATIO: 0.4 (ref 0–0.2)
UROBILINOGEN, URINE: NORMAL
WBC # BLD: 7.3 K/UL (ref 3.5–11)
WBC UA: ABNORMAL /HPF

## 2022-12-29 PROCEDURE — 85025 COMPLETE CBC W/AUTO DIFF WBC: CPT

## 2022-12-29 PROCEDURE — 84156 ASSAY OF PROTEIN URINE: CPT

## 2022-12-29 PROCEDURE — 84100 ASSAY OF PHOSPHORUS: CPT

## 2022-12-29 PROCEDURE — 36415 COLL VENOUS BLD VENIPUNCTURE: CPT

## 2022-12-29 PROCEDURE — 80053 COMPREHEN METABOLIC PANEL: CPT

## 2022-12-29 PROCEDURE — 81001 URINALYSIS AUTO W/SCOPE: CPT

## 2022-12-29 PROCEDURE — 82306 VITAMIN D 25 HYDROXY: CPT

## 2022-12-29 PROCEDURE — 83735 ASSAY OF MAGNESIUM: CPT

## 2022-12-29 PROCEDURE — 82570 ASSAY OF URINE CREATININE: CPT

## 2022-12-30 LAB — VITAMIN D 25-HYDROXY: 65.1 NG/ML

## 2023-01-05 ENCOUNTER — HOSPITAL ENCOUNTER (OUTPATIENT)
Age: 28
Setting detail: SPECIMEN
Discharge: HOME | End: 2023-01-05
Payer: COMMERCIAL

## 2023-01-05 DIAGNOSIS — R80.9 PROTEINURIA, UNSPECIFIED TYPE: ICD-10-CM

## 2023-01-05 DIAGNOSIS — I12.9 BENIGN HYPERTENSION WITH CHRONIC KIDNEY DISEASE, STAGE III (HCC): ICD-10-CM

## 2023-01-05 DIAGNOSIS — N18.32 ANEMIA IN STAGE 3B CHRONIC KIDNEY DISEASE (HCC): ICD-10-CM

## 2023-01-05 DIAGNOSIS — N18.32 STAGE 3B CHRONIC KIDNEY DISEASE (HCC): ICD-10-CM

## 2023-01-05 DIAGNOSIS — D63.1 ANEMIA IN STAGE 3B CHRONIC KIDNEY DISEASE (HCC): ICD-10-CM

## 2023-01-05 DIAGNOSIS — N18.30 BENIGN HYPERTENSION WITH CHRONIC KIDNEY DISEASE, STAGE III (HCC): ICD-10-CM

## 2023-01-05 PROCEDURE — 36415 COLL VENOUS BLD VENIPUNCTURE: CPT

## 2023-01-05 PROCEDURE — 86225 DNA ANTIBODY NATIVE: CPT

## 2023-01-05 PROCEDURE — 86038 ANTINUCLEAR ANTIBODIES: CPT

## 2023-01-05 PROCEDURE — 86160 COMPLEMENT ANTIGEN: CPT

## 2023-01-05 PROCEDURE — 86162 COMPLEMENT TOTAL (CH50): CPT

## 2023-01-05 PROCEDURE — 86235 NUCLEAR ANTIGEN ANTIBODY: CPT

## 2023-01-06 LAB
COMPLEMENT C3: 122 MG/DL (ref 90–180)
COMPLEMENT C4: 31 MG/DL (ref 10–40)

## 2023-01-08 LAB
ANTI DNA DOUBLE STRANDED: 21 IU/ML
ANTI-NUCLEAR ANTIBODY (ANA): POSITIVE
COMPLEMENT TOTAL (CH50): 53.9 U/ML (ref 38.7–89.9)
ENA ANTIBODIES SCREEN: 0.4 U/ML

## 2023-05-31 RX ORDER — ONDANSETRON 4 MG/1
4 TABLET, FILM COATED ORAL 3 TIMES DAILY PRN
Qty: 10 TABLET | Refills: 0 | Status: SHIPPED | OUTPATIENT
Start: 2023-05-31

## 2023-06-19 ENCOUNTER — HOSPITAL ENCOUNTER (OUTPATIENT)
Age: 28
Discharge: HOME | End: 2023-06-19
Payer: COMMERCIAL

## 2023-06-19 DIAGNOSIS — D63.1 ANEMIA IN STAGE 3B CHRONIC KIDNEY DISEASE (HCC): ICD-10-CM

## 2023-06-19 DIAGNOSIS — N18.30 BENIGN HYPERTENSION WITH CHRONIC KIDNEY DISEASE, STAGE III (HCC): ICD-10-CM

## 2023-06-19 DIAGNOSIS — N18.32 ANEMIA IN STAGE 3B CHRONIC KIDNEY DISEASE (HCC): ICD-10-CM

## 2023-06-19 DIAGNOSIS — I10 ESSENTIAL HYPERTENSION: ICD-10-CM

## 2023-06-19 DIAGNOSIS — R80.9 PROTEINURIA, UNSPECIFIED TYPE: ICD-10-CM

## 2023-06-19 DIAGNOSIS — I12.9 BENIGN HYPERTENSION WITH CHRONIC KIDNEY DISEASE, STAGE III (HCC): ICD-10-CM

## 2023-06-19 DIAGNOSIS — N18.32 STAGE 3B CHRONIC KIDNEY DISEASE (HCC): ICD-10-CM

## 2023-06-19 LAB
ALBUMIN SERPL-MCNC: 5 G/DL (ref 3.5–5.2)
ALP SERPL-CCNC: 75 U/L (ref 40–129)
ALT SERPL-CCNC: 22 U/L (ref 5–41)
ANION GAP SERPL CALCULATED.3IONS-SCNC: 14 MMOL/L (ref 9–17)
AST SERPL-CCNC: 24 U/L
BACTERIA URNS QL MICRO: ABNORMAL
BASOPHILS # BLD: 0.07 K/UL (ref 0–0.2)
BASOPHILS NFR BLD: 1 % (ref 0–2)
BILIRUB SERPL-MCNC: 1 MG/DL (ref 0.3–1.2)
BILIRUB UR QL STRIP: NEGATIVE
BUN SERPL-MCNC: 28 MG/DL (ref 6–20)
CALCIUM SERPL-MCNC: 10.3 MG/DL (ref 8.6–10.4)
CASTS #/AREA URNS LPF: ABNORMAL /LPF
CHLORIDE SERPL-SCNC: 96 MMOL/L (ref 98–107)
CHOLEST SERPL-MCNC: 140 MG/DL
CHOLESTEROL/HDL RATIO: 4.4
CLARITY UR: CLEAR
CO2 SERPL-SCNC: 27 MMOL/L (ref 20–31)
COLOR UR: YELLOW
CREAT SERPL-MCNC: 2.73 MG/DL (ref 0.7–1.2)
EOSINOPHIL # BLD: 0.43 K/UL (ref 0–0.4)
EOSINOPHILS RELATIVE PERCENT: 6 % (ref 0–4)
EPI CELLS #/AREA URNS HPF: ABNORMAL /HPF
ERYTHROCYTE [DISTWIDTH] IN BLOOD BY AUTOMATED COUNT: 12.4 % (ref 11.5–14.9)
GFR SERPL CREATININE-BSD FRML MDRD: 32 ML/MIN/1.73M2
GLUCOSE SERPL-MCNC: 81 MG/DL (ref 70–99)
GLUCOSE UR STRIP-MCNC: NEGATIVE MG/DL
HCT VFR BLD AUTO: 40.9 % (ref 41–53)
HDLC SERPL-MCNC: 32 MG/DL
HGB BLD-MCNC: 14.7 G/DL (ref 13.5–17.5)
HGB UR QL STRIP.AUTO: ABNORMAL
KETONES UR STRIP-MCNC: NEGATIVE MG/DL
LDLC SERPL CALC-MCNC: 57 MG/DL (ref 0–130)
LEUKOCYTE ESTERASE UR QL STRIP: NEGATIVE
LYMPHOCYTES # BLD: 28 % (ref 24–44)
LYMPHOCYTES NFR BLD: 1.99 K/UL (ref 1–4.8)
MAGNESIUM SERPL-MCNC: 2.1 MG/DL (ref 1.6–2.6)
MCH RBC QN AUTO: 30.1 PG (ref 26–34)
MCHC RBC AUTO-ENTMCNC: 36 G/DL (ref 31–37)
MCV RBC AUTO: 83.8 FL (ref 80–100)
MONOCYTES NFR BLD: 0.64 K/UL (ref 0.1–1.3)
MONOCYTES NFR BLD: 9 % (ref 1–7)
MORPHOLOGY: ABNORMAL
NEUTROPHILS NFR BLD: 56 % (ref 36–66)
NEUTS SEG NFR BLD: 3.97 K/UL (ref 1.3–9.1)
NITRITE UR QL STRIP: NEGATIVE
PH UR STRIP: 6.5 [PH] (ref 5–8)
PHOSPHATE SERPL-MCNC: 4.3 MG/DL (ref 2.5–4.5)
PLATELET # BLD AUTO: 232 K/UL (ref 150–450)
PMV BLD AUTO: 6.5 FL (ref 6–12)
POTASSIUM SERPL-SCNC: 3.7 MMOL/L (ref 3.7–5.3)
PROT SERPL-MCNC: 8 G/DL (ref 6.4–8.3)
PROT UR STRIP-MCNC: ABNORMAL MG/DL
RBC # BLD AUTO: 4.88 M/UL (ref 4.5–5.9)
RBC #/AREA URNS HPF: ABNORMAL /HPF
SODIUM SERPL-SCNC: 137 MMOL/L (ref 135–144)
SP GR UR STRIP: 1.01 (ref 1–1.03)
TRIGL SERPL-MCNC: 256 MG/DL
TSH SERPL-MCNC: 1.74 UIU/ML (ref 0.3–5)
UROBILINOGEN UR STRIP-ACNC: NORMAL
WBC #/AREA URNS HPF: ABNORMAL /HPF
WBC OTHER # BLD: 7.1 K/UL (ref 3.5–11)

## 2023-06-19 PROCEDURE — 80053 COMPREHEN METABOLIC PANEL: CPT

## 2023-06-19 PROCEDURE — 84100 ASSAY OF PHOSPHORUS: CPT

## 2023-06-19 PROCEDURE — 85027 COMPLETE CBC AUTOMATED: CPT

## 2023-06-19 PROCEDURE — 36415 COLL VENOUS BLD VENIPUNCTURE: CPT

## 2023-06-19 PROCEDURE — 80061 LIPID PANEL: CPT

## 2023-06-19 PROCEDURE — 81001 URINALYSIS AUTO W/SCOPE: CPT

## 2023-06-19 PROCEDURE — 83735 ASSAY OF MAGNESIUM: CPT

## 2023-06-19 PROCEDURE — 84443 ASSAY THYROID STIM HORMONE: CPT

## 2023-07-21 ENCOUNTER — HOSPITAL ENCOUNTER (OUTPATIENT)
Age: 28
Discharge: HOME | End: 2023-07-21
Payer: COMMERCIAL

## 2023-07-21 DIAGNOSIS — D63.1 ANEMIA IN STAGE 3B CHRONIC KIDNEY DISEASE (HCC): ICD-10-CM

## 2023-07-21 DIAGNOSIS — N18.30 BENIGN HYPERTENSION WITH CHRONIC KIDNEY DISEASE, STAGE III (HCC): ICD-10-CM

## 2023-07-21 DIAGNOSIS — I12.9 BENIGN HYPERTENSION WITH CHRONIC KIDNEY DISEASE, STAGE III (HCC): ICD-10-CM

## 2023-07-21 DIAGNOSIS — R80.9 PROTEINURIA, UNSPECIFIED TYPE: ICD-10-CM

## 2023-07-21 DIAGNOSIS — N18.32 ANEMIA IN STAGE 3B CHRONIC KIDNEY DISEASE (HCC): ICD-10-CM

## 2023-07-21 DIAGNOSIS — N18.32 STAGE 3B CHRONIC KIDNEY DISEASE (HCC): ICD-10-CM

## 2023-07-21 LAB
FOLATE SERPL-MCNC: 15 NG/ML
VIT B12 SERPL-MCNC: 595 PG/ML (ref 232–1245)

## 2023-07-21 PROCEDURE — 82746 ASSAY OF FOLIC ACID SERUM: CPT

## 2023-07-21 PROCEDURE — 82607 VITAMIN B-12: CPT

## 2023-07-21 PROCEDURE — 36415 COLL VENOUS BLD VENIPUNCTURE: CPT

## 2023-08-01 ENCOUNTER — E-VISIT (OUTPATIENT)
Dept: PRIMARY CARE CLINIC | Age: 28
End: 2023-08-01
Payer: COMMERCIAL

## 2023-08-01 DIAGNOSIS — L23.7 ALLERGIC CONTACT DERMATITIS DUE TO PLANTS, EXCEPT FOOD: Primary | ICD-10-CM

## 2023-08-01 PROCEDURE — 99422 OL DIG E/M SVC 11-20 MIN: CPT | Performed by: NURSE PRACTITIONER

## 2023-08-01 RX ORDER — PREDNISONE 20 MG/1
TABLET ORAL
Qty: 18 TABLET | Refills: 0 | Status: SHIPPED | OUTPATIENT
Start: 2023-08-01 | End: 2023-08-11

## 2023-08-01 NOTE — PROGRESS NOTES
Kip Dominguez (1995) initiated an asynchronous digital communication through [address] [place] [address]. HPI: per patient questionnaire     Exam: not applicable    Diagnoses and all orders for this visit:  Diagnoses and all orders for this visit:    Allergic contact dermatitis due to plants, except food    Other orders  -     predniSONE (DELTASONE) 20 MG tablet; 3 tabs x 3 days, then 2 tabs x 3 days, then 1 tab x 3 days    Supportive care  F/u with pcp as needed      Time: EV2 - 11-20 minutes were spent on the digital evaluation and management of this patient.  15 min     OLIVE Corley - CNP

## 2023-08-30 ENCOUNTER — E-VISIT (OUTPATIENT)
Dept: PRIMARY CARE CLINIC | Age: 28
End: 2023-08-30
Payer: COMMERCIAL

## 2023-08-30 DIAGNOSIS — L30.9 DERMATITIS: Primary | ICD-10-CM

## 2023-08-30 PROCEDURE — 99422 OL DIG E/M SVC 11-20 MIN: CPT | Performed by: NURSE PRACTITIONER

## 2023-08-30 RX ORDER — HYDROXYZINE 50 MG/1
50 TABLET, FILM COATED ORAL EVERY 8 HOURS PRN
Qty: 30 TABLET | Refills: 0 | Status: SHIPPED | OUTPATIENT
Start: 2023-08-30 | End: 2023-09-09

## 2023-08-30 NOTE — PROGRESS NOTES
Reviewed questionnaire and photo    Reviewed meds/allergies    Dx Dermatitis    Plan Has been on steroids 8/1/23 and 8/12/23. Rx given for kenalog and atarax.  Follow up with PCP if no improvement    Time spent on visit 11 min

## 2023-11-21 ENCOUNTER — E-VISIT (OUTPATIENT)
Dept: PRIMARY CARE CLINIC | Age: 28
End: 2023-11-21

## 2023-11-21 DIAGNOSIS — J03.90 ACUTE TONSILLITIS, UNSPECIFIED ETIOLOGY: Primary | ICD-10-CM

## 2023-11-21 RX ORDER — AMOXICILLIN 500 MG/1
500 CAPSULE ORAL 2 TIMES DAILY
Qty: 20 CAPSULE | Refills: 0 | Status: SHIPPED | OUTPATIENT
Start: 2023-11-21 | End: 2023-12-01

## 2023-11-21 ASSESSMENT — LIFESTYLE VARIABLES: SMOKING_STATUS: NO, I HAVE NEVER SMOKED

## 2024-01-23 ENCOUNTER — HOSPITAL ENCOUNTER (OUTPATIENT)
Age: 29
Discharge: HOME | End: 2024-01-23
Payer: COMMERCIAL

## 2024-01-23 ENCOUNTER — HOSPITAL ENCOUNTER (INPATIENT)
Age: 29
LOS: 5 days | Discharge: HOME | DRG: 683 | End: 2024-01-28
Attending: EMERGENCY MEDICINE | Admitting: FAMILY MEDICINE
Payer: COMMERCIAL

## 2024-01-23 DIAGNOSIS — N18.32 ANEMIA IN STAGE 3B CHRONIC KIDNEY DISEASE (HCC): ICD-10-CM

## 2024-01-23 DIAGNOSIS — F33.1 MAJOR DEPRESSIVE DISORDER, RECURRENT EPISODE, MODERATE (HCC): ICD-10-CM

## 2024-01-23 DIAGNOSIS — I12.9 BENIGN HYPERTENSION WITH CHRONIC KIDNEY DISEASE, STAGE III (HCC): ICD-10-CM

## 2024-01-23 DIAGNOSIS — E83.52 HYPERCALCEMIA: ICD-10-CM

## 2024-01-23 DIAGNOSIS — R80.9 PROTEINURIA, UNSPECIFIED TYPE: ICD-10-CM

## 2024-01-23 DIAGNOSIS — N18.30 BENIGN HYPERTENSION WITH CHRONIC KIDNEY DISEASE, STAGE III (HCC): ICD-10-CM

## 2024-01-23 DIAGNOSIS — D63.1 ANEMIA IN STAGE 3B CHRONIC KIDNEY DISEASE (HCC): ICD-10-CM

## 2024-01-23 DIAGNOSIS — N17.9 AKI (ACUTE KIDNEY INJURY) (HCC): Primary | ICD-10-CM

## 2024-01-23 DIAGNOSIS — N18.32 STAGE 3B CHRONIC KIDNEY DISEASE (HCC): ICD-10-CM

## 2024-01-23 LAB
25(OH)D3 SERPL-MCNC: 84.6 NG/ML (ref 30–100)
ALBUMIN SERPL-MCNC: 4.6 G/DL (ref 3.5–5.2)
ALBUMIN SERPL-MCNC: 5 G/DL (ref 3.5–5.2)
ALP SERPL-CCNC: 77 U/L (ref 40–129)
ALP SERPL-CCNC: 86 U/L (ref 40–129)
ALT SERPL-CCNC: 18 U/L (ref 5–41)
ALT SERPL-CCNC: 19 U/L (ref 5–41)
ANION GAP SERPL CALCULATED.3IONS-SCNC: 14 MMOL/L (ref 9–17)
ANION GAP SERPL CALCULATED.3IONS-SCNC: 9 MMOL/L (ref 9–17)
AST SERPL-CCNC: 22 U/L
AST SERPL-CCNC: 23 U/L
BACTERIA URNS QL MICRO: ABNORMAL
BASOPHILS # BLD: 0 K/UL (ref 0–0.2)
BASOPHILS # BLD: 0.1 K/UL (ref 0–0.2)
BASOPHILS NFR BLD: 1 % (ref 0–2)
BASOPHILS NFR BLD: 1 % (ref 0–2)
BILIRUB SERPL-MCNC: 0.6 MG/DL (ref 0.3–1.2)
BILIRUB SERPL-MCNC: 0.7 MG/DL (ref 0.3–1.2)
BILIRUB UR QL STRIP: NEGATIVE
BUN SERPL-MCNC: 42 MG/DL (ref 6–20)
BUN SERPL-MCNC: 44 MG/DL (ref 6–20)
CALCIUM SERPL-MCNC: 13.5 MG/DL (ref 8.6–10.4)
CALCIUM SERPL-MCNC: 13.7 MG/DL (ref 8.6–10.4)
CASTS #/AREA URNS LPF: ABNORMAL /LPF
CHLORIDE SERPL-SCNC: 94 MMOL/L (ref 98–107)
CHLORIDE SERPL-SCNC: 94 MMOL/L (ref 98–107)
CK SERPL-CCNC: 118 U/L (ref 39–308)
CLARITY UR: CLEAR
CO2 SERPL-SCNC: 26 MMOL/L (ref 20–31)
CO2 SERPL-SCNC: 31 MMOL/L (ref 20–31)
COLOR UR: YELLOW
CREAT SERPL-MCNC: 5.2 MG/DL (ref 0.7–1.2)
CREAT SERPL-MCNC: 5.2 MG/DL (ref 0.7–1.2)
CREAT UR-MCNC: 60.9 MG/DL (ref 39–259)
EOSINOPHIL # BLD: 0.4 K/UL (ref 0–0.4)
EOSINOPHIL # BLD: 0.4 K/UL (ref 0–0.4)
EOSINOPHILS RELATIVE PERCENT: 4 % (ref 0–4)
EOSINOPHILS RELATIVE PERCENT: 6 % (ref 0–4)
EPI CELLS #/AREA URNS HPF: ABNORMAL /HPF
ERYTHROCYTE [DISTWIDTH] IN BLOOD BY AUTOMATED COUNT: 12 % (ref 11.5–14.9)
ERYTHROCYTE [DISTWIDTH] IN BLOOD BY AUTOMATED COUNT: 12.3 % (ref 11.5–14.9)
GFR SERPL CREATININE-BSD FRML MDRD: 15 ML/MIN/1.73M2
GFR SERPL CREATININE-BSD FRML MDRD: 15 ML/MIN/1.73M2
GLUCOSE SERPL-MCNC: 83 MG/DL (ref 70–99)
GLUCOSE SERPL-MCNC: 87 MG/DL (ref 70–99)
GLUCOSE UR STRIP-MCNC: NEGATIVE MG/DL
HCT VFR BLD AUTO: 38.4 % (ref 41–53)
HCT VFR BLD AUTO: 42.8 % (ref 41–53)
HGB BLD-MCNC: 13.3 G/DL (ref 13.5–17.5)
HGB BLD-MCNC: 15 G/DL (ref 13.5–17.5)
HGB UR QL STRIP.AUTO: ABNORMAL
KETONES UR STRIP-MCNC: NEGATIVE MG/DL
LEUKOCYTE ESTERASE UR QL STRIP: ABNORMAL
LYMPHOCYTES NFR BLD: 1.5 K/UL (ref 1–4.8)
LYMPHOCYTES NFR BLD: 1.8 K/UL (ref 1–4.8)
LYMPHOCYTES RELATIVE PERCENT: 17 % (ref 24–44)
LYMPHOCYTES RELATIVE PERCENT: 25 % (ref 24–44)
MAGNESIUM SERPL-MCNC: 2.4 MG/DL (ref 1.6–2.6)
MAGNESIUM SERPL-MCNC: 2.4 MG/DL (ref 1.6–2.6)
MCH RBC QN AUTO: 29 PG (ref 26–34)
MCH RBC QN AUTO: 29.4 PG (ref 26–34)
MCHC RBC AUTO-ENTMCNC: 34.6 G/DL (ref 31–37)
MCHC RBC AUTO-ENTMCNC: 35 G/DL (ref 31–37)
MCV RBC AUTO: 83.8 FL (ref 80–100)
MCV RBC AUTO: 83.8 FL (ref 80–100)
MONOCYTES NFR BLD: 0.5 K/UL (ref 0.1–1.3)
MONOCYTES NFR BLD: 0.6 K/UL (ref 0.1–1.3)
MONOCYTES NFR BLD: 6 % (ref 1–7)
MONOCYTES NFR BLD: 8 % (ref 1–7)
NEUTROPHILS NFR BLD: 60 % (ref 36–66)
NEUTROPHILS NFR BLD: 72 % (ref 36–66)
NEUTS SEG NFR BLD: 4.4 K/UL (ref 1.3–9.1)
NEUTS SEG NFR BLD: 6.3 K/UL (ref 1.3–9.1)
NITRITE UR QL STRIP: NEGATIVE
PH UR STRIP: 7 [PH] (ref 5–8)
PHOSPHATE SERPL-MCNC: 3.9 MG/DL (ref 2.5–4.5)
PLATELET # BLD AUTO: 250 K/UL (ref 150–450)
PLATELET # BLD AUTO: 275 K/UL (ref 150–450)
PMV BLD AUTO: 6.5 FL (ref 6–12)
PMV BLD AUTO: 6.8 FL (ref 6–12)
POTASSIUM SERPL-SCNC: 3.9 MMOL/L (ref 3.7–5.3)
POTASSIUM SERPL-SCNC: 4.5 MMOL/L (ref 3.7–5.3)
PROT SERPL-MCNC: 8.2 G/DL (ref 6.4–8.3)
PROT SERPL-MCNC: 9 G/DL (ref 6.4–8.3)
PROT UR STRIP-MCNC: ABNORMAL MG/DL
RBC # BLD AUTO: 4.58 M/UL (ref 4.5–5.9)
RBC # BLD AUTO: 5.11 M/UL (ref 4.5–5.9)
RBC #/AREA URNS HPF: ABNORMAL /HPF
SODIUM SERPL-SCNC: 134 MMOL/L (ref 135–144)
SODIUM SERPL-SCNC: 134 MMOL/L (ref 135–144)
SP GR UR STRIP: 1.01 (ref 1–1.03)
TOTAL PROTEIN, URINE: 59 MG/DL
TSH SERPL DL<=0.05 MIU/L-ACNC: 2.54 UIU/ML (ref 0.3–5)
URINE TOTAL PROTEIN CREATININE RATIO: 0.97 (ref 0–0.2)
UROBILINOGEN UR STRIP-ACNC: NORMAL EU/DL (ref 0–1)
WBC #/AREA URNS HPF: ABNORMAL /HPF
WBC OTHER # BLD: 7.4 K/UL (ref 3.5–11)
WBC OTHER # BLD: 8.8 K/UL (ref 3.5–11)

## 2024-01-23 PROCEDURE — 84443 ASSAY THYROID STIM HORMONE: CPT

## 2024-01-23 PROCEDURE — 82550 ASSAY OF CK (CPK): CPT

## 2024-01-23 PROCEDURE — 99285 EMERGENCY DEPT VISIT HI MDM: CPT

## 2024-01-23 PROCEDURE — 85025 COMPLETE CBC W/AUTO DIFF WBC: CPT

## 2024-01-23 PROCEDURE — 93005 ELECTROCARDIOGRAM TRACING: CPT | Performed by: EMERGENCY MEDICINE

## 2024-01-23 PROCEDURE — 82570 ASSAY OF URINE CREATININE: CPT

## 2024-01-23 PROCEDURE — 6360000002 HC RX W HCPCS: Performed by: FAMILY MEDICINE

## 2024-01-23 PROCEDURE — 96374 THER/PROPH/DIAG INJ IV PUSH: CPT

## 2024-01-23 PROCEDURE — 2580000003 HC RX 258: Performed by: EMERGENCY MEDICINE

## 2024-01-23 PROCEDURE — 6370000000 HC RX 637 (ALT 250 FOR IP): Performed by: FAMILY MEDICINE

## 2024-01-23 PROCEDURE — 83735 ASSAY OF MAGNESIUM: CPT

## 2024-01-23 PROCEDURE — 84156 ASSAY OF PROTEIN URINE: CPT

## 2024-01-23 PROCEDURE — 81001 URINALYSIS AUTO W/SCOPE: CPT

## 2024-01-23 PROCEDURE — 84100 ASSAY OF PHOSPHORUS: CPT

## 2024-01-23 PROCEDURE — 36415 COLL VENOUS BLD VENIPUNCTURE: CPT

## 2024-01-23 PROCEDURE — 6360000002 HC RX W HCPCS: Performed by: EMERGENCY MEDICINE

## 2024-01-23 PROCEDURE — 1200000000 HC SEMI PRIVATE

## 2024-01-23 PROCEDURE — 80053 COMPREHEN METABOLIC PANEL: CPT

## 2024-01-23 PROCEDURE — 82306 VITAMIN D 25 HYDROXY: CPT

## 2024-01-23 RX ORDER — AMITRIPTYLINE HYDROCHLORIDE 25 MG/1
50 TABLET, FILM COATED ORAL EVERY EVENING
Status: DISCONTINUED | OUTPATIENT
Start: 2024-01-23 | End: 2024-01-28 | Stop reason: HOSPADM

## 2024-01-23 RX ORDER — SODIUM CHLORIDE 9 MG/ML
INJECTION, SOLUTION INTRAVENOUS CONTINUOUS
Status: DISCONTINUED | OUTPATIENT
Start: 2024-01-23 | End: 2024-01-28 | Stop reason: HOSPADM

## 2024-01-23 RX ORDER — ONDANSETRON 2 MG/ML
4 INJECTION INTRAMUSCULAR; INTRAVENOUS EVERY 6 HOURS PRN
Status: DISCONTINUED | OUTPATIENT
Start: 2024-01-23 | End: 2024-01-28 | Stop reason: HOSPADM

## 2024-01-23 RX ORDER — B.COAGUL,SUBTILIS/INULIN/VIT C 1B CELL-1G
1 TABLET,CHEWABLE ORAL DAILY
Status: ON HOLD | COMMUNITY
End: 2024-01-28 | Stop reason: HOSPADM

## 2024-01-23 RX ORDER — MONTELUKAST SODIUM 10 MG/1
10 TABLET ORAL DAILY
Status: DISCONTINUED | OUTPATIENT
Start: 2024-01-23 | End: 2024-01-28 | Stop reason: HOSPADM

## 2024-01-23 RX ORDER — TIZANIDINE 2 MG/1
2 TABLET ORAL NIGHTLY PRN
Status: DISCONTINUED | OUTPATIENT
Start: 2024-01-23 | End: 2024-01-28 | Stop reason: HOSPADM

## 2024-01-23 RX ORDER — POLYETHYLENE GLYCOL 3350 17 G/17G
17 POWDER, FOR SOLUTION ORAL DAILY PRN
Status: DISCONTINUED | OUTPATIENT
Start: 2024-01-23 | End: 2024-01-28 | Stop reason: HOSPADM

## 2024-01-23 RX ORDER — SERTRALINE HYDROCHLORIDE 100 MG/1
200 TABLET, FILM COATED ORAL DAILY
Status: DISCONTINUED | OUTPATIENT
Start: 2024-01-23 | End: 2024-01-28 | Stop reason: HOSPADM

## 2024-01-23 RX ORDER — ATORVASTATIN CALCIUM 20 MG/1
20 TABLET, FILM COATED ORAL DAILY
Status: DISCONTINUED | OUTPATIENT
Start: 2024-01-23 | End: 2024-01-28 | Stop reason: HOSPADM

## 2024-01-23 RX ORDER — NICOTINE 14MG/24HR
1 PATCH, TRANSDERMAL 24 HOURS TRANSDERMAL DAILY
COMMUNITY
End: 2024-01-23

## 2024-01-23 RX ORDER — M-VIT,TX,IRON,MINS/CALC/FOLIC 27MG-0.4MG
1 TABLET ORAL DAILY
Status: DISCONTINUED | OUTPATIENT
Start: 2024-01-23 | End: 2024-01-28 | Stop reason: HOSPADM

## 2024-01-23 RX ORDER — ACETAMINOPHEN 650 MG/1
650 SUPPOSITORY RECTAL EVERY 6 HOURS PRN
Status: DISCONTINUED | OUTPATIENT
Start: 2024-01-23 | End: 2024-01-28 | Stop reason: HOSPADM

## 2024-01-23 RX ORDER — PHENOL 1.4 %
10 AEROSOL, SPRAY (ML) MUCOUS MEMBRANE NIGHTLY PRN
COMMUNITY

## 2024-01-23 RX ORDER — HEPARIN SODIUM 5000 [USP'U]/ML
5000 INJECTION, SOLUTION INTRAVENOUS; SUBCUTANEOUS EVERY 8 HOURS SCHEDULED
Status: DISCONTINUED | OUTPATIENT
Start: 2024-01-23 | End: 2024-01-28 | Stop reason: HOSPADM

## 2024-01-23 RX ORDER — LABETALOL HYDROCHLORIDE 5 MG/ML
10 INJECTION, SOLUTION INTRAVENOUS ONCE
Status: COMPLETED | OUTPATIENT
Start: 2024-01-23 | End: 2024-01-23

## 2024-01-23 RX ORDER — ONDANSETRON 4 MG/1
4 TABLET, ORALLY DISINTEGRATING ORAL EVERY 8 HOURS PRN
Status: DISCONTINUED | OUTPATIENT
Start: 2024-01-23 | End: 2024-01-28 | Stop reason: HOSPADM

## 2024-01-23 RX ORDER — AMLODIPINE BESYLATE 10 MG/1
10 TABLET ORAL DAILY
Status: DISCONTINUED | OUTPATIENT
Start: 2024-01-23 | End: 2024-01-28 | Stop reason: HOSPADM

## 2024-01-23 RX ORDER — LORAZEPAM 0.5 MG/1
0.5 TABLET ORAL EVERY 6 HOURS PRN
Status: DISCONTINUED | OUTPATIENT
Start: 2024-01-23 | End: 2024-01-28 | Stop reason: HOSPADM

## 2024-01-23 RX ORDER — ACETAMINOPHEN 325 MG/1
650 TABLET ORAL EVERY 6 HOURS PRN
Status: DISCONTINUED | OUTPATIENT
Start: 2024-01-23 | End: 2024-01-28 | Stop reason: HOSPADM

## 2024-01-23 RX ORDER — METOPROLOL TARTRATE 50 MG/1
50 TABLET, FILM COATED ORAL 2 TIMES DAILY
Status: DISCONTINUED | OUTPATIENT
Start: 2024-01-23 | End: 2024-01-24

## 2024-01-23 RX ORDER — FAMOTIDINE 20 MG/1
20 TABLET, FILM COATED ORAL DAILY
Status: DISCONTINUED | OUTPATIENT
Start: 2024-01-24 | End: 2024-01-24

## 2024-01-23 RX ORDER — ZOLPIDEM TARTRATE 5 MG/1
5 TABLET ORAL NIGHTLY PRN
Status: DISCONTINUED | OUTPATIENT
Start: 2024-01-23 | End: 2024-01-28 | Stop reason: HOSPADM

## 2024-01-23 RX ORDER — HYDROXYZINE HYDROCHLORIDE 25 MG/1
25 TABLET, FILM COATED ORAL 3 TIMES DAILY PRN
COMMUNITY

## 2024-01-23 RX ADMIN — HEPARIN SODIUM 5000 UNITS: 5000 INJECTION INTRAVENOUS; SUBCUTANEOUS at 20:31

## 2024-01-23 RX ADMIN — AMITRIPTYLINE HYDROCHLORIDE 50 MG: 25 TABLET, FILM COATED ORAL at 20:31

## 2024-01-23 RX ADMIN — METOPROLOL TARTRATE 50 MG: 50 TABLET ORAL at 20:31

## 2024-01-23 RX ADMIN — LABETALOL HYDROCHLORIDE 10 MG: 5 INJECTION, SOLUTION INTRAVENOUS at 17:08

## 2024-01-23 RX ADMIN — ATORVASTATIN CALCIUM 20 MG: 20 TABLET, FILM COATED ORAL at 20:31

## 2024-01-23 RX ADMIN — MONTELUKAST 10 MG: 10 TABLET, FILM COATED ORAL at 20:39

## 2024-01-23 RX ADMIN — ZOLPIDEM TARTRATE 5 MG: 5 TABLET ORAL at 22:21

## 2024-01-23 RX ADMIN — SODIUM CHLORIDE: 9 INJECTION, SOLUTION INTRAVENOUS at 17:20

## 2024-01-23 ASSESSMENT — PAIN - FUNCTIONAL ASSESSMENT: PAIN_FUNCTIONAL_ASSESSMENT: NONE - DENIES PAIN

## 2024-01-23 NOTE — ED TRIAGE NOTES
Mode of arrival (squad #, walk in, police, etc) : walk in        Chief complaint(s): abnormal lab        Arrival Note (brief scenario, treatment PTA, etc).: pt was sent for routine labs by nephrologist and was advised to come to the ED for critical creat and calcium level        C= \"Have you ever felt that you should Cut down on your drinking?\"  No  A= \"Have people Annoyed you by criticizing your drinking?\"  No  G= \"Have you ever felt bad or Guilty about your drinking?\"  No  E= \"Have you ever had a drink as an Eye-opener first thing in the morning to steady your nerves or to help a hangover?\"  No      Deferred []      Reason for deferring: N/A    *If yes to two or more: probable alcohol abuse.*

## 2024-01-23 NOTE — ED PROVIDER NOTES
EMERGENCY DEPARTMENT ENCOUNTER    Pt Name: Ubaldo Sotomayor  MRN: [id number]  Birthdate 1995  Date of evaluation: 1/23/24  CHIEF COMPLAINT       Chief Complaint   Patient presents with    Abnormal Lab     HISTORY OF PRESENT ILLNESS   HPI  Sent here for elevated creatinine elevated calcium.  He is chronic kidney disease, hypertension.  Has a nephrologist here.  Was seen at the [facility] after kidney biopsy that was inconclusive.  Was told is most likely related to hypertension at the [facility].  Patient is very reluctant to get a repeat kidney biopsy.  Because of the complication afterwards including ureter blood clot and hydronephrosis.  BP has been running high lately, even at PCP regular follow-up.  Took his bp meds this morning  Losartan, norvasc, metoprolol  Making plenty of urine he states.  Urinating normal frequency.  No edema in his legs, no dyspnea.  No fatigue malaise.  REVIEW OF SYSTEMS     Review of Systems   All other systems reviewed and are negative.    PASTMEDICAL HISTORY     Past Medical History:   Diagnosis Date    Abnormal EKG     Anxiety     History of chicken pox 2002    Hyperlipidemia     Hypertension     OCD (obsessive compulsive disorder)     Stage 3a chronic kidney disease (HCC) 06/15/2022     Past Problem List  Patient Active Problem List   Diagnosis Code    Essential hypertension I10    Other hyperlipidemia E78.49    Generalized anxiety disorder F41.1    Major depressive disorder, recurrent episode, moderate ([facility]) F33.1    Stage 3a chronic kidney disease (HCC) N18.31    Left flank pain R10.9    UMANG (acute kidney injury) ([facility]) N17.9     SURGICAL HISTORY       Past Surgical History:   Procedure Laterality Date    CAPSULE ENDOSCOPY N/A 6/14/2021    ESOPHAGEAL CAPSULE ENDOSCOPY / BRAVO performed by Christopher Damon MD at [facility] ENDO    IR BIOPSY KIDNEY PERCUTANEOUS  9/1/2022    IR BIOPSY KIDNEY PERCUTANEOUS 9/1/2022 [facility] SPECIAL PROCEDURES    IR BIOPSY KIDNEY PERCUTANEOUS

## 2024-01-24 ENCOUNTER — APPOINTMENT (OUTPATIENT)
Dept: ULTRASOUND IMAGING | Age: 29
DRG: 683 | End: 2024-01-24
Payer: COMMERCIAL

## 2024-01-24 LAB
ANION GAP SERPL CALCULATED.3IONS-SCNC: 10 MMOL/L (ref 9–17)
BUN SERPL-MCNC: 37 MG/DL (ref 6–20)
C3 SERPL-MCNC: 99 MG/DL (ref 90–180)
C4 SERPL-MCNC: 25 MG/DL (ref 10–40)
CALCIUM SERPL-MCNC: 12 MG/DL (ref 8.6–10.4)
CHLORIDE SERPL-SCNC: 99 MMOL/L (ref 98–107)
CO2 SERPL-SCNC: 28 MMOL/L (ref 20–31)
CREAT SERPL-MCNC: 5.1 MG/DL (ref 0.7–1.2)
CREAT UR-MCNC: 26.4 MG/DL (ref 39–259)
EKG ATRIAL RATE: 86 BPM
EKG P AXIS: 6 DEGREES
EKG P-R INTERVAL: 146 MS
EKG Q-T INTERVAL: 350 MS
EKG QRS DURATION: 92 MS
EKG QTC CALCULATION (BAZETT): 418 MS
EKG R AXIS: 5 DEGREES
EKG T AXIS: 7 DEGREES
EKG VENTRICULAR RATE: 86 BPM
GFR SERPL CREATININE-BSD FRML MDRD: 15 ML/MIN/1.73M2
GLUCOSE SERPL-MCNC: 120 MG/DL (ref 70–99)
POTASSIUM SERPL-SCNC: 4.1 MMOL/L (ref 3.7–5.3)
PTH-INTACT SERPL-MCNC: 11 PG/ML (ref 15–65)
SODIUM SERPL-SCNC: 137 MMOL/L (ref 135–144)
SODIUM UR-SCNC: 36 MMOL/L

## 2024-01-24 PROCEDURE — 2580000003 HC RX 258: Performed by: FAMILY MEDICINE

## 2024-01-24 PROCEDURE — 1200000000 HC SEMI PRIVATE

## 2024-01-24 PROCEDURE — 86235 NUCLEAR ANTIGEN ANTIBODY: CPT

## 2024-01-24 PROCEDURE — 6360000002 HC RX W HCPCS: Performed by: FAMILY MEDICINE

## 2024-01-24 PROCEDURE — 76770 US EXAM ABDO BACK WALL COMP: CPT

## 2024-01-24 PROCEDURE — 86225 DNA ANTIBODY NATIVE: CPT

## 2024-01-24 PROCEDURE — 6370000000 HC RX 637 (ALT 250 FOR IP): Performed by: FAMILY MEDICINE

## 2024-01-24 PROCEDURE — 93010 ELECTROCARDIOGRAM REPORT: CPT | Performed by: INTERNAL MEDICINE

## 2024-01-24 PROCEDURE — 86160 COMPLEMENT ANTIGEN: CPT

## 2024-01-24 PROCEDURE — 36415 COLL VENOUS BLD VENIPUNCTURE: CPT

## 2024-01-24 PROCEDURE — 82164 ANGIOTENSIN I ENZYME TEST: CPT

## 2024-01-24 PROCEDURE — 82570 ASSAY OF URINE CREATININE: CPT

## 2024-01-24 PROCEDURE — 86038 ANTINUCLEAR ANTIBODIES: CPT

## 2024-01-24 PROCEDURE — 80048 BASIC METABOLIC PNL TOTAL CA: CPT

## 2024-01-24 PROCEDURE — 83970 ASSAY OF PARATHORMONE: CPT

## 2024-01-24 PROCEDURE — 84300 ASSAY OF URINE SODIUM: CPT

## 2024-01-24 RX ORDER — METOPROLOL TARTRATE 100 MG/1
100 TABLET ORAL 2 TIMES DAILY
Status: DISCONTINUED | OUTPATIENT
Start: 2024-01-24 | End: 2024-01-28 | Stop reason: HOSPADM

## 2024-01-24 RX ORDER — FAMOTIDINE 20 MG/1
10 TABLET, FILM COATED ORAL DAILY
Status: DISCONTINUED | OUTPATIENT
Start: 2024-01-25 | End: 2024-01-25

## 2024-01-24 RX ORDER — HYDROCODONE BITARTRATE AND ACETAMINOPHEN 5; 325 MG/1; MG/1
1 TABLET ORAL EVERY 12 HOURS
Status: DISCONTINUED | OUTPATIENT
Start: 2024-01-24 | End: 2024-01-28 | Stop reason: HOSPADM

## 2024-01-24 RX ORDER — LABETALOL HYDROCHLORIDE 5 MG/ML
10 INJECTION, SOLUTION INTRAVENOUS EVERY 8 HOURS
Status: DISCONTINUED | OUTPATIENT
Start: 2024-01-24 | End: 2024-01-24

## 2024-01-24 RX ORDER — HYDRALAZINE HYDROCHLORIDE 20 MG/ML
10 INJECTION INTRAMUSCULAR; INTRAVENOUS EVERY 8 HOURS PRN
Status: DISCONTINUED | OUTPATIENT
Start: 2024-01-24 | End: 2024-01-28 | Stop reason: HOSPADM

## 2024-01-24 RX ADMIN — ACETAMINOPHEN 650 MG: 325 TABLET, FILM COATED ORAL at 13:11

## 2024-01-24 RX ADMIN — LORAZEPAM 0.5 MG: 0.5 TABLET ORAL at 21:14

## 2024-01-24 RX ADMIN — HEPARIN SODIUM 5000 UNITS: 5000 INJECTION INTRAVENOUS; SUBCUTANEOUS at 20:34

## 2024-01-24 RX ADMIN — ONDANSETRON 4 MG: 4 TABLET, ORALLY DISINTEGRATING ORAL at 04:34

## 2024-01-24 RX ADMIN — AMITRIPTYLINE HYDROCHLORIDE 50 MG: 25 TABLET, FILM COATED ORAL at 17:18

## 2024-01-24 RX ADMIN — HYDROCODONE BITARTRATE AND ACETAMINOPHEN 1 TABLET: 5; 325 TABLET ORAL at 19:08

## 2024-01-24 RX ADMIN — ONDANSETRON 4 MG: 2 INJECTION INTRAMUSCULAR; INTRAVENOUS at 13:10

## 2024-01-24 RX ADMIN — LORAZEPAM 0.5 MG: 0.5 TABLET ORAL at 01:01

## 2024-01-24 RX ADMIN — SERTRALINE 200 MG: 100 TABLET, FILM COATED ORAL at 07:41

## 2024-01-24 RX ADMIN — METOPROLOL TARTRATE 50 MG: 50 TABLET ORAL at 07:41

## 2024-01-24 RX ADMIN — ONDANSETRON 4 MG: 2 INJECTION INTRAMUSCULAR; INTRAVENOUS at 21:14

## 2024-01-24 RX ADMIN — HEPARIN SODIUM 5000 UNITS: 5000 INJECTION INTRAVENOUS; SUBCUTANEOUS at 13:10

## 2024-01-24 RX ADMIN — LABETALOL HYDROCHLORIDE 10 MG: 5 INJECTION, SOLUTION INTRAVENOUS at 07:41

## 2024-01-24 RX ADMIN — SODIUM CHLORIDE: 9 INJECTION, SOLUTION INTRAVENOUS at 04:35

## 2024-01-24 RX ADMIN — HEPARIN SODIUM 5000 UNITS: 5000 INJECTION INTRAVENOUS; SUBCUTANEOUS at 05:59

## 2024-01-24 RX ADMIN — Medication 1 TABLET: at 07:41

## 2024-01-24 RX ADMIN — FAMOTIDINE 20 MG: 20 TABLET, FILM COATED ORAL at 07:41

## 2024-01-24 RX ADMIN — METOPROLOL 100 MG: 100 TABLET ORAL at 20:34

## 2024-01-24 RX ADMIN — ATORVASTATIN CALCIUM 20 MG: 20 TABLET, FILM COATED ORAL at 08:09

## 2024-01-24 RX ADMIN — ZOLPIDEM TARTRATE 5 MG: 5 TABLET ORAL at 22:10

## 2024-01-24 RX ADMIN — HYDRALAZINE HYDROCHLORIDE 10 MG: 20 INJECTION INTRAMUSCULAR; INTRAVENOUS at 11:14

## 2024-01-24 RX ADMIN — ACETAMINOPHEN 650 MG: 325 TABLET, FILM COATED ORAL at 06:31

## 2024-01-24 RX ADMIN — LORAZEPAM 0.5 MG: 0.5 TABLET ORAL at 13:12

## 2024-01-24 RX ADMIN — MONTELUKAST 10 MG: 10 TABLET, FILM COATED ORAL at 17:18

## 2024-01-24 RX ADMIN — AMLODIPINE BESYLATE 10 MG: 10 TABLET ORAL at 07:41

## 2024-01-24 RX ADMIN — LABETALOL HYDROCHLORIDE 10 MG: 5 INJECTION, SOLUTION INTRAVENOUS at 01:04

## 2024-01-24 ASSESSMENT — PAIN DESCRIPTION - LOCATION
LOCATION: HEAD

## 2024-01-24 ASSESSMENT — PAIN DESCRIPTION - DESCRIPTORS
DESCRIPTORS: THROBBING
DESCRIPTORS: ACHING
DESCRIPTORS: SORE;SHARP

## 2024-01-24 ASSESSMENT — PAIN DESCRIPTION - ORIENTATION
ORIENTATION: MID;ANTERIOR
ORIENTATION: MID

## 2024-01-24 ASSESSMENT — PAIN SCALES - GENERAL
PAINLEVEL_OUTOF10: 7
PAINLEVEL_OUTOF10: 7
PAINLEVEL_OUTOF10: 6

## 2024-01-24 ASSESSMENT — PAIN - FUNCTIONAL ASSESSMENT: PAIN_FUNCTIONAL_ASSESSMENT: ACTIVITIES ARE NOT PREVENTED

## 2024-01-24 NOTE — PLAN OF CARE
Problem: Discharge Planning  Goal: Discharge to home or other facility with appropriate resources  1/24/2024 1523 by Mandie Nicolas, RN  Outcome: Progressing  Flowsheets (Taken 1/24/2024 1055)  Discharge to home or other facility with appropriate resources:   Identify barriers to discharge with patient and caregiver   Arrange for needed discharge resources and transportation as appropriate   Identify discharge learning needs (meds, wound care, etc)   Refer to discharge planning if patient needs post-hospital services based on physician order or complex needs related to functional status, cognitive ability or social support system  1/24/2024 0342 by Sultana Valente, RN  Outcome: Progressing

## 2024-01-24 NOTE — CARE COORDINATION
Case Management Assessment  Initial Evaluation    Date/Time of Evaluation: 1/24/2024 9:18 AM  Assessment Completed by: Maxine Yost RN    If patient is discharged prior to next notation, then this note serves as note for discharge by case management.    Patient Name: Ubaldo Sotomayor                   YOB: 1995  Diagnosis: Hypercalcemia [E83.52]  Acute kidney failure (HCC) [N17.9]  UMANG (acute kidney injury) (HCC) [N17.9]                   Date / Time: 1/23/2024  3:42 PM    Patient Admission Status: Inpatient   Readmission Risk (Low < 19, Mod (19-27), High > 27): Readmission Risk Score: 12.3    Current PCP: Demetria Parisi MD  PCP verified by CM? Yes    Chart Reviewed: Yes      History Provided by: Patient  Patient Orientation: Alert and Oriented    Patient Cognition: Alert    Hospitalization in the last 30 days (Readmission):  No    If yes, Readmission Assessment in CM Navigator will be completed.    Advance Directives:      Code Status: Full Code   Patient's Primary Decision Maker is: Patient Declined (Legal Next of Kin Remains as Decision Maker)      Discharge Planning:    Patient lives with: Parent Type of Home: House  Primary Care Giver: Self  Patient Support Systems include: Parent, Family Members   Current Financial resources: None  Current community resources: None  Current services prior to admission: None            Current DME:              Type of Home Care services:  None    ADLS  Prior functional level: Independent in ADLs/IADLs  Current functional level: Independent in ADLs/IADLs    PT AM-PAC:   /24  OT AM-PAC:   /24    Family can provide assistance at DC: Yes  Would you like Case Management to discuss the discharge plan with any other family members/significant others, and if so, who? No  Plans to Return to Present Housing: Yes  Other Identified Issues/Barriers to RETURNING to current housing: None  Potential Assistance needed at discharge: N/A            Potential DME:

## 2024-01-24 NOTE — CONSULTS
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  Renal ultrasound  BMP daily    Thank you for the consultation.      Electronically signed by Ham Cardoza MD on 1/24/2024 at 1:59 PM

## 2024-01-25 LAB
ANION GAP SERPL CALCULATED.3IONS-SCNC: 10 MMOL/L (ref 9–17)
BUN SERPL-MCNC: 30 MG/DL (ref 6–20)
CALCIUM SERPL-MCNC: 10 MG/DL (ref 8.6–10.4)
CALCIUM UR-MCNC: 6.5 MG/DL
CALCIUM, URINE: 361 MG/24 H (ref 25–300)
CHLORIDE SERPL-SCNC: 101 MMOL/L (ref 98–107)
CO2 SERPL-SCNC: 27 MMOL/L (ref 20–31)
COLLECT DURATION TIME SPEC: 24 H
CREAT SERPL-MCNC: 4.1 MG/DL (ref 0.7–1.2)
GFR SERPL CREATININE-BSD FRML MDRD: 19 ML/MIN/1.73M2
GLUCOSE SERPL-MCNC: 70 MG/DL (ref 70–99)
POTASSIUM SERPL-SCNC: 4.4 MMOL/L (ref 3.7–5.3)
SODIUM SERPL-SCNC: 138 MMOL/L (ref 135–144)
SPECIMEN VOL UR: 5550 ML

## 2024-01-25 PROCEDURE — 82340 ASSAY OF CALCIUM IN URINE: CPT

## 2024-01-25 PROCEDURE — 6370000000 HC RX 637 (ALT 250 FOR IP): Performed by: FAMILY MEDICINE

## 2024-01-25 PROCEDURE — 2580000003 HC RX 258: Performed by: FAMILY MEDICINE

## 2024-01-25 PROCEDURE — 81050 URINALYSIS VOLUME MEASURE: CPT

## 2024-01-25 PROCEDURE — 6360000002 HC RX W HCPCS: Performed by: FAMILY MEDICINE

## 2024-01-25 PROCEDURE — 36415 COLL VENOUS BLD VENIPUNCTURE: CPT

## 2024-01-25 PROCEDURE — 80048 BASIC METABOLIC PNL TOTAL CA: CPT

## 2024-01-25 PROCEDURE — 1200000000 HC SEMI PRIVATE

## 2024-01-25 RX ORDER — HYDRALAZINE HYDROCHLORIDE 25 MG/1
25 TABLET, FILM COATED ORAL EVERY 8 HOURS SCHEDULED
Status: DISCONTINUED | OUTPATIENT
Start: 2024-01-25 | End: 2024-01-26

## 2024-01-25 RX ORDER — FLUTICASONE PROPIONATE 50 MCG
1 SPRAY, SUSPENSION (ML) NASAL DAILY
Status: DISCONTINUED | OUTPATIENT
Start: 2024-01-26 | End: 2024-01-28 | Stop reason: HOSPADM

## 2024-01-25 RX ORDER — PANTOPRAZOLE SODIUM 40 MG/1
40 TABLET, DELAYED RELEASE ORAL
Status: DISCONTINUED | OUTPATIENT
Start: 2024-01-26 | End: 2024-01-26

## 2024-01-25 RX ADMIN — HYDROCODONE BITARTRATE AND ACETAMINOPHEN 1 TABLET: 5; 325 TABLET ORAL at 06:21

## 2024-01-25 RX ADMIN — HYDROCODONE BITARTRATE AND ACETAMINOPHEN 1 TABLET: 5; 325 TABLET ORAL at 19:29

## 2024-01-25 RX ADMIN — HEPARIN SODIUM 5000 UNITS: 5000 INJECTION INTRAVENOUS; SUBCUTANEOUS at 06:21

## 2024-01-25 RX ADMIN — HEPARIN SODIUM 5000 UNITS: 5000 INJECTION INTRAVENOUS; SUBCUTANEOUS at 14:06

## 2024-01-25 RX ADMIN — LORAZEPAM 0.5 MG: 0.5 TABLET ORAL at 21:22

## 2024-01-25 RX ADMIN — HYDRALAZINE HYDROCHLORIDE 10 MG: 20 INJECTION INTRAMUSCULAR; INTRAVENOUS at 00:12

## 2024-01-25 RX ADMIN — METOPROLOL 100 MG: 100 TABLET ORAL at 07:50

## 2024-01-25 RX ADMIN — FAMOTIDINE 10 MG: 20 TABLET, FILM COATED ORAL at 07:50

## 2024-01-25 RX ADMIN — ACETAMINOPHEN 650 MG: 325 TABLET, FILM COATED ORAL at 14:04

## 2024-01-25 RX ADMIN — AMLODIPINE BESYLATE 10 MG: 10 TABLET ORAL at 07:50

## 2024-01-25 RX ADMIN — SODIUM CHLORIDE: 9 INJECTION, SOLUTION INTRAVENOUS at 22:18

## 2024-01-25 RX ADMIN — SERTRALINE 200 MG: 100 TABLET, FILM COATED ORAL at 07:54

## 2024-01-25 RX ADMIN — AMITRIPTYLINE HYDROCHLORIDE 50 MG: 25 TABLET, FILM COATED ORAL at 17:49

## 2024-01-25 RX ADMIN — METOPROLOL 100 MG: 100 TABLET ORAL at 21:22

## 2024-01-25 RX ADMIN — ZOLPIDEM TARTRATE 5 MG: 5 TABLET ORAL at 22:52

## 2024-01-25 RX ADMIN — HYDRALAZINE HYDROCHLORIDE 25 MG: 25 TABLET, FILM COATED ORAL at 17:49

## 2024-01-25 RX ADMIN — HYDRALAZINE HYDROCHLORIDE 25 MG: 25 TABLET, FILM COATED ORAL at 22:52

## 2024-01-25 RX ADMIN — HEPARIN SODIUM 5000 UNITS: 5000 INJECTION INTRAVENOUS; SUBCUTANEOUS at 22:51

## 2024-01-25 RX ADMIN — SODIUM CHLORIDE: 9 INJECTION, SOLUTION INTRAVENOUS at 10:39

## 2024-01-25 RX ADMIN — MONTELUKAST 10 MG: 10 TABLET, FILM COATED ORAL at 17:49

## 2024-01-25 RX ADMIN — LORAZEPAM 0.5 MG: 0.5 TABLET ORAL at 04:45

## 2024-01-25 RX ADMIN — ATORVASTATIN CALCIUM 20 MG: 20 TABLET, FILM COATED ORAL at 07:50

## 2024-01-25 ASSESSMENT — PAIN DESCRIPTION - LOCATION
LOCATION: HEAD
LOCATION: HEAD

## 2024-01-25 ASSESSMENT — PAIN SCALES - GENERAL
PAINLEVEL_OUTOF10: 6
PAINLEVEL_OUTOF10: 6

## 2024-01-25 NOTE — PLAN OF CARE
Problem: Discharge Planning  Goal: Discharge to home or other facility with appropriate resources  1/25/2024 0402 by Elo Hicks, RN  Outcome: Progressing  Flowsheets (Taken 1/24/2024 2232)  Discharge to home or other facility with appropriate resources:   Identify barriers to discharge with patient and caregiver   Arrange for needed discharge resources and transportation as appropriate   Identify discharge learning needs (meds, wound care, etc)   Refer to discharge planning if patient needs post-hospital services based on physician order or complex needs related to functional status, cognitive ability or social support system

## 2024-01-25 NOTE — PLAN OF CARE
Problem: Discharge Planning  Goal: Discharge to home or other facility with appropriate resources  1/25/2024 1735 by Norman Merida, RN  Outcome: Progressing  1/25/2024 0402 by Elo Hicks, RN  Outcome: Progressing  Flowsheets (Taken 1/24/2024 2232)  Discharge to home or other facility with appropriate resources:   Identify barriers to discharge with patient and caregiver   Arrange for needed discharge resources and transportation as appropriate   Identify discharge learning needs (meds, wound care, etc)   Refer to discharge planning if patient needs post-hospital services based on physician order or complex needs related to functional status, cognitive ability or social support system

## 2024-01-25 NOTE — H&P
Hospital Medicine  History and Physical                                                                                                                                                 Full Code    Patient:  Ubaldo Sotomayor  MRN: [id number]                                                                                                                                                                     CHIEF COMPLAINT:  hypertension/ac on chronic kidney disease    History Obtained From:  patient  PCP: Demetria Parisi MD    HISTORY OF PRESENT ILLNESS:   The patient is a 28 y.o. male who presents with h/o of htn and h/o of ckd and his baseline cr has been around 2,7 pt has had renal biopsy and was insuffient for diagnosis, pt has been followed by renal service of [place]  Pt had out pt labs done which showed elevated cr of 5.2 with normal K,pt was advised to be seen in ER and later admitted.      Past Medical History:        Diagnosis Date    Abnormal EKG     Anxiety     History of chicken pox 2002    Hyperlipidemia     Hypertension     OCD (obsessive compulsive disorder)     Stage 3a chronic kidney disease (HCC) 06/15/2022       Past Surgical History:        Procedure Laterality Date    CAPSULE ENDOSCOPY N/A 6/14/2021    ESOPHAGEAL CAPSULE ENDOSCOPY / BRAVO performed by Christopher Damon MD at [facility] ENDO    IR BIOPSY KIDNEY PERCUTANEOUS  9/1/2022    IR BIOPSY KIDNEY PERCUTANEOUS 9/1/2022 [facility] SPECIAL PROCEDURES    IR BIOPSY KIDNEY PERCUTANEOUS  9/2/2022    IR BIOPSY KIDNEY PERCUTANEOUS    UPPER GASTROINTESTINAL ENDOSCOPY N/A 9/2/2020    EGD BIOPSY performed by Christopher Damon MD at [facility] ENDO       Medications Prior to Admission:    Prior to Admission medications    Medication Sig Start Date End Date Taking? Authorizing Provider   hydrOXYzine HCl (ATARAX) 25 MG tablet Take 1 tablet by mouth 3 times daily as needed for Anxiety   Yes Provider, MD Britney   Probiotic Product (CULTURELLE ABDOMINAL SUPPORT) 4-15 GM-MG

## 2024-01-25 NOTE — CARE COORDINATION
ONGOING DISCHARGE PLAN:    Patient is alert and oriented x4.    Spoke with patient regarding discharge plan and patient confirms that plan is still home without needs. Declined VNS.    Cre 4.1.    Will continue to follow for additional discharge needs.    If patient is discharged prior to next notation, then this note serves as note for discharge by case management.    Electronically signed by Deidra Anderson RN on 1/25/2024 at 3:15 PM

## 2024-01-26 LAB
ANA SER QL IA: POSITIVE
ANION GAP SERPL CALCULATED.3IONS-SCNC: 11 MMOL/L (ref 9–17)
BUN SERPL-MCNC: 32 MG/DL (ref 6–20)
CALCIUM SERPL-MCNC: 9.5 MG/DL (ref 8.6–10.4)
CHLORIDE SERPL-SCNC: 104 MMOL/L (ref 98–107)
CO2 SERPL-SCNC: 24 MMOL/L (ref 20–31)
CREAT SERPL-MCNC: 4 MG/DL (ref 0.7–1.2)
DSDNA IGG SER QL IA: 22 IU/ML
GFR SERPL CREATININE-BSD FRML MDRD: 20 ML/MIN/1.73M2
GLUCOSE SERPL-MCNC: 81 MG/DL (ref 70–99)
NUCLEAR IGG SER IA-RTO: 0.2 U/ML
POTASSIUM SERPL-SCNC: 4 MMOL/L (ref 3.7–5.3)
SODIUM SERPL-SCNC: 139 MMOL/L (ref 135–144)

## 2024-01-26 PROCEDURE — 80048 BASIC METABOLIC PNL TOTAL CA: CPT

## 2024-01-26 PROCEDURE — 1200000000 HC SEMI PRIVATE

## 2024-01-26 PROCEDURE — 6370000000 HC RX 637 (ALT 250 FOR IP): Performed by: FAMILY MEDICINE

## 2024-01-26 PROCEDURE — 6360000002 HC RX W HCPCS: Performed by: FAMILY MEDICINE

## 2024-01-26 PROCEDURE — 36415 COLL VENOUS BLD VENIPUNCTURE: CPT

## 2024-01-26 PROCEDURE — 2580000003 HC RX 258: Performed by: FAMILY MEDICINE

## 2024-01-26 PROCEDURE — 6370000000 HC RX 637 (ALT 250 FOR IP): Performed by: INTERNAL MEDICINE

## 2024-01-26 RX ORDER — DOXAZOSIN MESYLATE 4 MG/1
4 TABLET ORAL NIGHTLY
Status: DISCONTINUED | OUTPATIENT
Start: 2024-01-26 | End: 2024-01-28 | Stop reason: HOSPADM

## 2024-01-26 RX ADMIN — DOXAZOSIN 4 MG: 4 TABLET ORAL at 19:43

## 2024-01-26 RX ADMIN — AMITRIPTYLINE HYDROCHLORIDE 50 MG: 25 TABLET, FILM COATED ORAL at 17:09

## 2024-01-26 RX ADMIN — HEPARIN SODIUM 5000 UNITS: 5000 INJECTION INTRAVENOUS; SUBCUTANEOUS at 06:02

## 2024-01-26 RX ADMIN — HYDRALAZINE HYDROCHLORIDE 25 MG: 25 TABLET, FILM COATED ORAL at 06:05

## 2024-01-26 RX ADMIN — HEPARIN SODIUM 5000 UNITS: 5000 INJECTION INTRAVENOUS; SUBCUTANEOUS at 13:55

## 2024-01-26 RX ADMIN — ATORVASTATIN CALCIUM 20 MG: 20 TABLET, FILM COATED ORAL at 08:20

## 2024-01-26 RX ADMIN — AMLODIPINE BESYLATE 10 MG: 10 TABLET ORAL at 08:20

## 2024-01-26 RX ADMIN — HYDROCODONE BITARTRATE AND ACETAMINOPHEN 1 TABLET: 5; 325 TABLET ORAL at 06:03

## 2024-01-26 RX ADMIN — SERTRALINE 200 MG: 100 TABLET, FILM COATED ORAL at 08:20

## 2024-01-26 RX ADMIN — METOPROLOL 100 MG: 100 TABLET ORAL at 19:43

## 2024-01-26 RX ADMIN — SODIUM CHLORIDE: 9 INJECTION, SOLUTION INTRAVENOUS at 08:16

## 2024-01-26 RX ADMIN — PANTOPRAZOLE SODIUM 40 MG: 40 TABLET, DELAYED RELEASE ORAL at 06:05

## 2024-01-26 RX ADMIN — MONTELUKAST 10 MG: 10 TABLET, FILM COATED ORAL at 17:09

## 2024-01-26 RX ADMIN — HEPARIN SODIUM 5000 UNITS: 5000 INJECTION INTRAVENOUS; SUBCUTANEOUS at 21:45

## 2024-01-26 RX ADMIN — METOPROLOL 100 MG: 100 TABLET ORAL at 08:20

## 2024-01-26 RX ADMIN — ACETAMINOPHEN 650 MG: 325 TABLET, FILM COATED ORAL at 03:13

## 2024-01-26 RX ADMIN — FLUTICASONE PROPIONATE 1 SPRAY: 50 SPRAY, METERED NASAL at 08:14

## 2024-01-26 ASSESSMENT — PAIN SCALES - GENERAL
PAINLEVEL_OUTOF10: 4
PAINLEVEL_OUTOF10: 3

## 2024-01-26 ASSESSMENT — PAIN DESCRIPTION - LOCATION
LOCATION: HEAD
LOCATION: HEAD

## 2024-01-26 NOTE — CARE COORDINATION
ONGOING DISCHARGE PLAN:    Patient is alert and oriented x4.    Spoke with patient regarding discharge plan and patient confirms that plan is still home.     Nephrology consult  Creatinine 4.0 improved  Calcium 9.5 improved   IV fluids @ 100        Will continue to follow for additional discharge needs.    If patient is discharged prior to next notation, then this note serves as note for discharge by case management.    Electronically signed by Loren Weeks RN on 1/26/2024 at 9:36 AM

## 2024-01-26 NOTE — PLAN OF CARE
Problem: Discharge Planning  Goal: Discharge to home or other facility with appropriate resources  1/26/2024 0251 by Elo Hicks, RN  Outcome: Progressing  Flowsheets (Taken 1/25/2024 1930)  Discharge to home or other facility with appropriate resources:   Identify barriers to discharge with patient and caregiver   Arrange for needed discharge resources and transportation as appropriate   Identify discharge learning needs (meds, wound care, etc)   Refer to discharge planning if patient needs post-hospital services based on physician order or complex needs related to functional status, cognitive ability or social support system

## 2024-01-26 NOTE — PLAN OF CARE
Problem: Pain  Goal: Verbalizes/displays adequate comfort level or baseline comfort level  Outcome: Progressing  Note: Assess the location, characteristics, onset, duration, frequency, quality, and severity of pain. Encourage immediate report of pain. Use appropriate pain scale to rate pain. Manage pain using nonpharmacologic/pharmacologic interventions.      Problem: Skin/Tissue Integrity  Goal: Absence of new skin breakdown  Description: 1.  Monitor for areas of redness and/or skin breakdown  2.  Assess vascular access sites hourly  3.  Every 4-6 hours minimum:  Change oxygen saturation probe site  4.  Every 4-6 hours:  If on nasal continuous positive airway pressure, respiratory therapy assess nares and determine need for appliance change or resting period.  Outcome: Progressing  Note: Assess the overall condition of the skin. Check on bony prominences such as the sacrum, trochanters, scapulae, elbows, heels, inner and outer malleolus, inner and outer knees, back of head). Reinforce the importance of turning, mobility, and ambulation.

## 2024-01-27 LAB
ANION GAP SERPL CALCULATED.3IONS-SCNC: 9 MMOL/L (ref 9–17)
BUN SERPL-MCNC: 31 MG/DL (ref 6–20)
CALCIUM SERPL-MCNC: 8.6 MG/DL (ref 8.6–10.4)
CHLORIDE SERPL-SCNC: 105 MMOL/L (ref 98–107)
CO2 SERPL-SCNC: 23 MMOL/L (ref 20–31)
CREAT SERPL-MCNC: 3.7 MG/DL (ref 0.7–1.2)
GFR SERPL CREATININE-BSD FRML MDRD: 22 ML/MIN/1.73M2
GLUCOSE SERPL-MCNC: 88 MG/DL (ref 70–99)
POTASSIUM SERPL-SCNC: 4 MMOL/L (ref 3.7–5.3)
SODIUM SERPL-SCNC: 137 MMOL/L (ref 135–144)

## 2024-01-27 PROCEDURE — 2580000003 HC RX 258: Performed by: FAMILY MEDICINE

## 2024-01-27 PROCEDURE — 80048 BASIC METABOLIC PNL TOTAL CA: CPT

## 2024-01-27 PROCEDURE — 6360000002 HC RX W HCPCS: Performed by: FAMILY MEDICINE

## 2024-01-27 PROCEDURE — 1200000000 HC SEMI PRIVATE

## 2024-01-27 PROCEDURE — 36415 COLL VENOUS BLD VENIPUNCTURE: CPT

## 2024-01-27 PROCEDURE — 6370000000 HC RX 637 (ALT 250 FOR IP): Performed by: FAMILY MEDICINE

## 2024-01-27 PROCEDURE — 6370000000 HC RX 637 (ALT 250 FOR IP): Performed by: INTERNAL MEDICINE

## 2024-01-27 RX ADMIN — HYDROCODONE BITARTRATE AND ACETAMINOPHEN 1 TABLET: 5; 325 TABLET ORAL at 19:57

## 2024-01-27 RX ADMIN — SODIUM CHLORIDE: 9 INJECTION, SOLUTION INTRAVENOUS at 04:35

## 2024-01-27 RX ADMIN — HEPARIN SODIUM 5000 UNITS: 5000 INJECTION INTRAVENOUS; SUBCUTANEOUS at 14:22

## 2024-01-27 RX ADMIN — AMITRIPTYLINE HYDROCHLORIDE 50 MG: 25 TABLET, FILM COATED ORAL at 17:57

## 2024-01-27 RX ADMIN — AMLODIPINE BESYLATE 10 MG: 10 TABLET ORAL at 08:11

## 2024-01-27 RX ADMIN — METOPROLOL 100 MG: 100 TABLET ORAL at 08:11

## 2024-01-27 RX ADMIN — MONTELUKAST 10 MG: 10 TABLET, FILM COATED ORAL at 17:57

## 2024-01-27 RX ADMIN — ACETAMINOPHEN 650 MG: 325 TABLET, FILM COATED ORAL at 04:34

## 2024-01-27 RX ADMIN — HEPARIN SODIUM 5000 UNITS: 5000 INJECTION INTRAVENOUS; SUBCUTANEOUS at 07:11

## 2024-01-27 RX ADMIN — DOXAZOSIN 4 MG: 4 TABLET ORAL at 19:57

## 2024-01-27 RX ADMIN — SERTRALINE 200 MG: 100 TABLET, FILM COATED ORAL at 08:11

## 2024-01-27 RX ADMIN — HYDROCODONE BITARTRATE AND ACETAMINOPHEN 1 TABLET: 5; 325 TABLET ORAL at 07:11

## 2024-01-27 RX ADMIN — FLUTICASONE PROPIONATE 1 SPRAY: 50 SPRAY, METERED NASAL at 08:13

## 2024-01-27 RX ADMIN — SODIUM CHLORIDE: 9 INJECTION, SOLUTION INTRAVENOUS at 14:32

## 2024-01-27 RX ADMIN — LORAZEPAM 0.5 MG: 0.5 TABLET ORAL at 21:23

## 2024-01-27 RX ADMIN — METOPROLOL 100 MG: 100 TABLET ORAL at 19:57

## 2024-01-27 RX ADMIN — ACETAMINOPHEN 650 MG: 325 TABLET, FILM COATED ORAL at 14:29

## 2024-01-27 RX ADMIN — ATORVASTATIN CALCIUM 20 MG: 20 TABLET, FILM COATED ORAL at 08:11

## 2024-01-27 RX ADMIN — HEPARIN SODIUM 5000 UNITS: 5000 INJECTION INTRAVENOUS; SUBCUTANEOUS at 21:15

## 2024-01-27 ASSESSMENT — PAIN DESCRIPTION - LOCATION
LOCATION: HEAD
LOCATION: HEAD

## 2024-01-27 ASSESSMENT — PAIN DESCRIPTION - DESCRIPTORS
DESCRIPTORS: ACHING
DESCRIPTORS: THROBBING

## 2024-01-27 ASSESSMENT — PAIN SCALES - GENERAL
PAINLEVEL_OUTOF10: 8
PAINLEVEL_OUTOF10: 5
PAINLEVEL_OUTOF10: 7
PAINLEVEL_OUTOF10: 4
PAINLEVEL_OUTOF10: 5

## 2024-01-27 ASSESSMENT — PAIN DESCRIPTION - ORIENTATION: ORIENTATION: ANTERIOR

## 2024-01-27 ASSESSMENT — PAIN SCALES - WONG BAKER: WONGBAKER_NUMERICALRESPONSE: 0

## 2024-01-27 ASSESSMENT — PAIN - FUNCTIONAL ASSESSMENT: PAIN_FUNCTIONAL_ASSESSMENT: ACTIVITIES ARE NOT PREVENTED

## 2024-01-27 NOTE — PLAN OF CARE
Problem: Discharge Planning  Goal: Discharge to home or other facility with appropriate resources  Outcome: Progressing  Flowsheets (Taken 1/26/2024 1945 by Kobe Chambers, RN)  Discharge to home or other facility with appropriate resources: Identify barriers to discharge with patient and caregiver     Problem: Pain  Goal: Verbalizes/displays adequate comfort level or baseline comfort level  1/27/2024 0448 by Deidra Agosto, RN  Outcome: Progressing  1/26/2024 1827 by Norman Merida RN  Outcome: Progressing  Note: Assess the location, characteristics, onset, duration, frequency, quality, and severity of pain. Encourage immediate report of pain. Use appropriate pain scale to rate pain. Manage pain using nonpharmacologic/pharmacologic interventions.      Problem: Skin/Tissue Integrity  Goal: Absence of new skin breakdown  Description: 1.  Monitor for areas of redness and/or skin breakdown  2.  Assess vascular access sites hourly  3.  Every 4-6 hours minimum:  Change oxygen saturation probe site  4.  Every 4-6 hours:  If on nasal continuous positive airway pressure, respiratory therapy assess nares and determine need for appliance change or resting period.  1/27/2024 0448 by Deidra Agosto, RN  Outcome: Progressing  1/26/2024 1827 by Norman Merida RN  Outcome: Progressing  Note: Assess the overall condition of the skin. Check on bony prominences such as the sacrum, trochanters, scapulae, elbows, heels, inner and outer malleolus, inner and outer knees, back of head). Reinforce the importance of turning, mobility, and ambulation.      Problem: Safety - Adult  Goal: Free from fall injury  Outcome: Progressing     Problem: ABCDS Injury Assessment  Goal: Absence of physical injury  Outcome: Progressing

## 2024-01-27 NOTE — PLAN OF CARE
Problem: Discharge Planning  Goal: Discharge to home or other facility with appropriate resources  1/27/2024 1811 by Lauryn Menjivar RN  Outcome: Progressing  1/27/2024 0448 by Deidra Agosto RN  Outcome: Progressing  Flowsheets (Taken 1/26/2024 1945 by Kobe Chambers, RN)  Discharge to home or other facility with appropriate resources: Identify barriers to discharge with patient and caregiver     Problem: Pain  Goal: Verbalizes/displays adequate comfort level or baseline comfort level  1/27/2024 1811 by Lauryn Menjivar RN  Outcome: Progressing  1/27/2024 0448 by Deidra Agosto RN  Outcome: Progressing     Problem: Skin/Tissue Integrity  Goal: Absence of new skin breakdown  Description: 1.  Monitor for areas of redness and/or skin breakdown  2.  Assess vascular access sites hourly  3.  Every 4-6 hours minimum:  Change oxygen saturation probe site  4.  Every 4-6 hours:  If on nasal continuous positive airway pressure, respiratory therapy assess nares and determine need for appliance change or resting period.  1/27/2024 1811 by Lauryn Menjivar RN  Outcome: Progressing  1/27/2024 0448 by Deidra Agosto RN  Outcome: Progressing     Problem: Safety - Adult  Goal: Free from fall injury  1/27/2024 1811 by Lauryn Menjivar RN  Outcome: Progressing  1/27/2024 0448 by Deidra Agosto RN  Outcome: Progressing     Problem: ABCDS Injury Assessment  Goal: Absence of physical injury  1/27/2024 1811 by Lauryn Menjivar RN  Outcome: Progressing  1/27/2024 0448 by Deidra Agosto RN  Outcome: Progressing

## 2024-01-28 VITALS
HEIGHT: 71 IN | TEMPERATURE: 97.3 F | BODY MASS INDEX: 28.7 KG/M2 | WEIGHT: 205.03 LBS | SYSTOLIC BLOOD PRESSURE: 143 MMHG | HEART RATE: 77 BPM | RESPIRATION RATE: 18 BRPM | DIASTOLIC BLOOD PRESSURE: 105 MMHG | OXYGEN SATURATION: 100 %

## 2024-01-28 LAB
ACE SERPL-CCNC: 27 U/L (ref 8–52)
ANION GAP SERPL CALCULATED.3IONS-SCNC: 9 MMOL/L (ref 9–17)
BASOPHILS # BLD: 0 K/UL (ref 0–0.2)
BASOPHILS NFR BLD: 1 % (ref 0–2)
BUN SERPL-MCNC: 30 MG/DL (ref 6–20)
CALCIUM SERPL-MCNC: 8.9 MG/DL (ref 8.6–10.4)
CHLORIDE SERPL-SCNC: 105 MMOL/L (ref 98–107)
CO2 SERPL-SCNC: 24 MMOL/L (ref 20–31)
CREAT SERPL-MCNC: 3.5 MG/DL (ref 0.7–1.2)
EOSINOPHIL # BLD: 0.3 K/UL (ref 0–0.4)
EOSINOPHILS RELATIVE PERCENT: 5 % (ref 0–4)
ERYTHROCYTE [DISTWIDTH] IN BLOOD BY AUTOMATED COUNT: 12.3 % (ref 11.5–14.9)
GFR SERPL CREATININE-BSD FRML MDRD: 23 ML/MIN/1.73M2
GLUCOSE SERPL-MCNC: 82 MG/DL (ref 70–99)
HCT VFR BLD AUTO: 30.5 % (ref 41–53)
HGB BLD-MCNC: 10.9 G/DL (ref 13.5–17.5)
LYMPHOCYTES NFR BLD: 1.4 K/UL (ref 1–4.8)
LYMPHOCYTES RELATIVE PERCENT: 29 % (ref 24–44)
MCH RBC QN AUTO: 29.9 PG (ref 26–34)
MCHC RBC AUTO-ENTMCNC: 35.6 G/DL (ref 31–37)
MCV RBC AUTO: 83.9 FL (ref 80–100)
MONOCYTES NFR BLD: 0.4 K/UL (ref 0.1–1.3)
MONOCYTES NFR BLD: 9 % (ref 1–7)
NEUTROPHILS NFR BLD: 56 % (ref 36–66)
NEUTS SEG NFR BLD: 2.7 K/UL (ref 1.3–9.1)
PLATELET # BLD AUTO: 170 K/UL (ref 150–450)
PMV BLD AUTO: 6.1 FL (ref 6–12)
POTASSIUM SERPL-SCNC: 4 MMOL/L (ref 3.7–5.3)
RBC # BLD AUTO: 3.64 M/UL (ref 4.5–5.9)
SODIUM SERPL-SCNC: 138 MMOL/L (ref 135–144)
WBC OTHER # BLD: 4.8 K/UL (ref 3.5–11)

## 2024-01-28 PROCEDURE — 36415 COLL VENOUS BLD VENIPUNCTURE: CPT

## 2024-01-28 PROCEDURE — 80048 BASIC METABOLIC PNL TOTAL CA: CPT

## 2024-01-28 PROCEDURE — 6360000002 HC RX W HCPCS: Performed by: FAMILY MEDICINE

## 2024-01-28 PROCEDURE — 85025 COMPLETE CBC W/AUTO DIFF WBC: CPT

## 2024-01-28 PROCEDURE — 6370000000 HC RX 637 (ALT 250 FOR IP): Performed by: FAMILY MEDICINE

## 2024-01-28 PROCEDURE — 2580000003 HC RX 258: Performed by: FAMILY MEDICINE

## 2024-01-28 RX ORDER — FLUTICASONE PROPIONATE 50 MCG
1 SPRAY, SUSPENSION (ML) NASAL DAILY
Qty: 16 G | Refills: 3 | Status: SHIPPED | OUTPATIENT
Start: 2024-01-29

## 2024-01-28 RX ORDER — CARVEDILOL 25 MG/1
25 TABLET ORAL 2 TIMES DAILY
Qty: 60 TABLET | Refills: 3 | Status: SHIPPED | OUTPATIENT
Start: 2024-01-28

## 2024-01-28 RX ORDER — METOPROLOL TARTRATE 100 MG/1
100 TABLET ORAL 2 TIMES DAILY
Qty: 60 TABLET | Refills: 3 | Status: SHIPPED | OUTPATIENT
Start: 2024-01-28 | End: 2024-01-28 | Stop reason: HOSPADM

## 2024-01-28 RX ORDER — DOXAZOSIN MESYLATE 4 MG/1
4 TABLET ORAL NIGHTLY
Qty: 30 TABLET | Refills: 1 | Status: SHIPPED | OUTPATIENT
Start: 2024-01-28

## 2024-01-28 RX ORDER — LORAZEPAM 0.5 MG/1
0.5 TABLET ORAL 2 TIMES DAILY PRN
Qty: 60 TABLET | Refills: 0 | Status: SHIPPED | OUTPATIENT
Start: 2024-01-28 | End: 2024-02-27

## 2024-01-28 RX ADMIN — SERTRALINE 200 MG: 100 TABLET, FILM COATED ORAL at 07:56

## 2024-01-28 RX ADMIN — METOPROLOL 100 MG: 100 TABLET ORAL at 07:56

## 2024-01-28 RX ADMIN — HEPARIN SODIUM 5000 UNITS: 5000 INJECTION INTRAVENOUS; SUBCUTANEOUS at 05:30

## 2024-01-28 RX ADMIN — AMLODIPINE BESYLATE 10 MG: 10 TABLET ORAL at 07:56

## 2024-01-28 RX ADMIN — ATORVASTATIN CALCIUM 20 MG: 20 TABLET, FILM COATED ORAL at 07:56

## 2024-01-28 RX ADMIN — SODIUM CHLORIDE: 9 INJECTION, SOLUTION INTRAVENOUS at 00:45

## 2024-01-28 RX ADMIN — FLUTICASONE PROPIONATE 1 SPRAY: 50 SPRAY, METERED NASAL at 07:58

## 2024-01-28 RX ADMIN — SODIUM CHLORIDE: 9 INJECTION, SOLUTION INTRAVENOUS at 09:11

## 2024-01-28 RX ADMIN — HEPARIN SODIUM 5000 UNITS: 5000 INJECTION INTRAVENOUS; SUBCUTANEOUS at 14:21

## 2024-01-28 NOTE — PROGRESS NOTES
01/24/24 1059   Encounter Summary   Encounter Overview/Reason   Encounter   Service Provided For: Patient   Referral/Consult From: Yoni   Last Encounter  01/24/24   Complexity of Encounter Low   Begin Time 1000   End Time  1015   Total Time Calculated 15 min   Spiritual/Emotional needs   Type Spiritual Support   Rituals, Rites and Sacraments   Type Sacrament of Sick       
   01/24/24 1421   Encounter Summary   Encounter Overview/Reason  Volunteer Encounter   Service Provided For: Patient   Referral/Consult From: Yoni   Last Encounter  01/24/24   Complexity of Encounter Low   Spiritual/Emotional needs   Type Spiritual Support   Rituals, Rites and Sacraments   Type Mandaeism Communion   Assessment/Intervention/Outcome   Intervention Prayer (assurance of)/[place]       
   01/25/24 1546   Encounter Summary   Encounter Overview/Reason  Volunteer Encounter   Service Provided For: Patient and family together   Referral/Consult From: Rounding   Last Encounter  01/25/24   Complexity of Encounter Low   Spiritual/Emotional needs   Type Spiritual Support   Rituals, Rites and Sacraments   Type Hoahaoism Communion       
   01/26/24 1158   Encounter Summary   Encounter Overview/Reason  Volunteer Encounter   Service Provided For: Patient and family together   Referral/Consult From: Rounding   Last Encounter  01/26/24   Complexity of Encounter Low   Spiritual/Emotional needs   Type Spiritual Distress   Rituals, Rites and Sacraments   Type Amish Communion  (x3)       
  NEPHROLOGY progress note    Patient :  Ubaldo Sotomayor; 28 y.o. MRN# [id number]  Location:  2064/2064-01  Attending:  Manny Liriano MD  Admit Date:  1/23/2024   Hospital Day: 3      Reason for Consult:  UMANG on CKD      Chief Complaint:  Abnormal labs, acute kidney injury elevated serum creatinine and elevated serum calcium      Subjective/interval hx    Patient seen and examined, no new complains today  Scr improved to 4.0 mg/dl  Sca 10.0  Good uop.  BP slightly elevated SBP in 150's-160's    History of Present Illness:    This is a 28 y.o. male with past medical history of essential hypertension, CKD stage IIIb with baseline serum creatinine around 2.7 mg/dL, follows up with renal services of [place].  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    Patient had a outpatient lab draw which showed abnormal creatinine serum creatinine peaked to 5.2. mg/dL and therefore patient was instructed to go to the hospital for further evaluation and management.    Patient denies any nausea vomiting complains of mild headache no chest pain shortness of breath no swelling in the legs no flank pain no abdominal pain.    Urinalysis showed 2+ protein trace leukocyte Estrace casts 0-2 WBCs 3-5 RBC 3-5 hemoglobin moderate  UPC 0.97  Serum calcium was also elevated to 13.7 patient admits taking Tums 5 to 6 tablets every day for heartburn for years.    Pt denies any history of  prolonged NSAID use.  Patient denies dysuria, gross hematuria, flank pain, nocturia, urgency, passing frothy urine or urinary 
  NEPHROLOGY progress note    Patient :  Ubaldo Sotomayor; 28 y.o. MRN# [id number]  Location:  2064/2064-01  Attending:  Manny Liriano MD  Admit Date:  1/23/2024   Hospital Day: 2      Reason for Consult:  UMANG on CKD      Chief Complaint:  Abnormal labs, acute kidney injury elevated serum creatinine and elevated serum calcium      Subjective/interval hx    Patient seen and examined, no new complains today  Scr improved to 4.0 mg/dl  Sca 10.0  Good uop.  BP slightly elevated SBP in 150's-160's    History of Present Illness:    This is a 28 y.o. male with past medical history of essential hypertension, CKD stage IIIb with baseline serum creatinine around 2.7 mg/dL, follows up with renal services of [place].  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    Patient had a outpatient lab draw which showed abnormal creatinine serum creatinine peaked to 5.2. mg/dL and therefore patient was instructed to go to the hospital for further evaluation and management.    Patient denies any nausea vomiting complains of mild headache no chest pain shortness of breath no swelling in the legs no flank pain no abdominal pain.    Urinalysis showed 2+ protein trace leukocyte Estrace casts 0-2 WBCs 3-5 RBC 3-5 hemoglobin moderate  UPC 0.97  Serum calcium was also elevated to 13.7 patient admits taking Tums 5 to 6 tablets every day for heartburn for years.    Pt denies any history of  prolonged NSAID use.  Patient denies dysuria, gross hematuria, flank pain, nocturia, urgency, passing frothy urine or urinary 
Hospitalist Progress Note  1/26/2024 12:22 PM  Subjective:   Admit Date: 1/23/2024  PCP: Demetria Parisi MD     Full Code      C/c:  Chief Complaint   Patient presents with    Abnormal Lab         Interval History: doing slightly better, cr still high, gfr not improved much,     Diet: ADULT DIET; Regular; 4 carb choices (60 gm/meal)                                ip days:3  Medications:   Scheduled Meds:   doxazosin  4 mg Oral Nightly    fluticasone  1 spray Each Nostril Daily    metoprolol tartrate  100 mg Oral BID    HYDROcodone 5 mg - acetaminophen  1 tablet Oral Q12H    amitriptyline  50 mg Oral QPM    amLODIPine  10 mg Oral Daily    atorvastatin  20 mg Oral Daily    montelukast  10 mg Oral Daily    [Held by provider] therapeutic multivitamin-minerals  1 tablet Oral Daily    sertraline  200 mg Oral Daily    heparin (porcine)  5,000 Units SubCUTAneous 3 times per day     Continuous Infusions:   sodium chloride 100 mL/hr at 01/26/24 0816     PRN Meds:.hydrALAZINE, tiZANidine, ondansetron **OR** ondansetron, polyethylene glycol, acetaminophen **OR** acetaminophen, zolpidem, LORazepam     CBC:   Recent Labs     01/23/24  1649   WBC 8.8   HGB 15.0        BMP:    Recent Labs     01/24/24  0637 01/25/24  1120 01/26/24  0556    138 139   K 4.1 4.4 4.0   CL 99 101 104   CO2 28 27 24   BUN 37* 30* 32*   CREATININE 5.1* 4.1* 4.0*   GLUCOSE 120* 70 81     Hepatic:   Recent Labs     01/23/24  1649   AST 23   ALT 19   BILITOT 0.6   ALKPHOS 86     Troponin: No results for input(s): \"TROPONINI\" in the last 72 hours.  BNP: No results for input(s): \"BNP\" in the last 72 hours.  Lipids: No results for input(s): \"CHOL\", \"HDL\" in the last 72 hours.    Invalid input(s): \"LDLCALCU\"  INR: No results for input(s): \"INR\" in the last 72 hours.    Objective:   Vitals: BP (!) 147/109   Pulse 78   Temp 97.6 °F (36.4 °C) (Oral)   Resp 16   Ht 1.803 m (5' 11\")   Wt 93 kg (205 lb 0.4 oz)   SpO2 100%   BMI 28.60 kg/m² 
Hospitalist Progress Note  1/27/2024 3:48 PM  Subjective:   Admit Date: 1/23/2024  PCP: Demetria Parisi MD     Full Code      C/c:  Chief Complaint   Patient presents with    Abnormal Lab         Interval History: pt doing slightly better, pt bun/cr have slightly improved, no complaints    Diet: ADULT DIET; Regular; 4 carb choices (60 gm/meal); No Added Salt (3-4 gm)                                ip days:4  Medications:   Scheduled Meds:   doxazosin  4 mg Oral Nightly    fluticasone  1 spray Each Nostril Daily    metoprolol tartrate  100 mg Oral BID    HYDROcodone 5 mg - acetaminophen  1 tablet Oral Q12H    amitriptyline  50 mg Oral QPM    amLODIPine  10 mg Oral Daily    atorvastatin  20 mg Oral Daily    montelukast  10 mg Oral Daily    [Held by provider] therapeutic multivitamin-minerals  1 tablet Oral Daily    sertraline  200 mg Oral Daily    heparin (porcine)  5,000 Units SubCUTAneous 3 times per day     Continuous Infusions:   sodium chloride 100 mL/hr at 01/27/24 1432     PRN Meds:.hydrALAZINE, tiZANidine, ondansetron **OR** ondansetron, polyethylene glycol, acetaminophen **OR** acetaminophen, zolpidem, LORazepam     CBC: No results for input(s): \"WBC\", \"HGB\", \"PLT\" in the last 72 hours.  BMP:    Recent Labs     01/25/24  1120 01/26/24  0556 01/27/24  0524    139 137   K 4.4 4.0 4.0    104 105   CO2 27 24 23   BUN 30* 32* 31*   CREATININE 4.1* 4.0* 3.7*   GLUCOSE 70 81 88     Hepatic: No results for input(s): \"AST\", \"ALT\", \"ALB\", \"BILITOT\", \"ALKPHOS\" in the last 72 hours.  Troponin: No results for input(s): \"TROPONINI\" in the last 72 hours.  BNP: No results for input(s): \"BNP\" in the last 72 hours.  Lipids: No results for input(s): \"CHOL\", \"HDL\" in the last 72 hours.    Invalid input(s): \"LDLCALCU\"  INR: No results for input(s): \"INR\" in the last 72 hours.    Objective:   Vitals: BP (!) 133/99   Pulse 75   Temp 97.5 °F (36.4 °C)   Resp 16   Ht 1.803 m (5' 11\")   Wt 93 kg (205 lb 0.4 oz) 
Nurse went over discharge instructions. All questions answered. IV removed.   
Patient transferred to room 2064 with mother at bedside,  all admission questions answered, vitals taken, oriented to room, and call light given to patient.   
Pharmacy Medication History Note      List of current medications patient is taking is complete.     Source of information: patient, dispense report,     Changes made to medication list:  Medications flagged for removal (include reason, ex. noncompliance):  Triamcinolone 0.1% - patient reports not taking    Medications removed (include reason, ex. therapy complete or physician discontinued):  Probiotic - replaced with preferred agent used here    Medications added/doses adjusted:  Probiotic Culturelle  Hydroxyzine 25mg as needed  Vitamin D3 - patient takes on Wednesdays    Other notes (ex. Recent course of antibiotics, Coumadin dosing):  OARRS negative    Denies use of other OTC or herbal medications.      Allergies clarified    Medication list provided to the patient:no  Medication education provided to the patient:none      Electronically signed by Sebastián Slade on 1/23/2024 at 6:29 PM                                                     
Writer called  to receive orders due to pt stating he is feeling a little anxious and wanting something on for sleep. Orders given foe Pepcid, Ambien, and Ativan.     
Writer notified  of patients elevated BP, orders received for labetalol every 8 hours until BP stable.       
                            [] Home with Home Health                                                                         [] Skilled Nursing Facility                                                                         [] Long-[facility] Acute [facility] Hospital      Patient is admitted as inpatient status because of co-morbidities listed above, severity of signs and symptoms as outlined, requirement for current medical therapies and most importantly because of direct risk to patient if care not provided in a hospital setting.          Manny Liriano MD, MD  [facility] Hospitalist    
PRN  LORazepam (ATIVAN) tablet 0.5 mg, Q6H PRN        Allergies:  Uncaria tomentosa (cats claw)    Social History:   Social History     Socioeconomic History    Marital status: Single     Spouse name: Not on file    Number of children: Not on file    Years of education: Not on file    Highest education level: Not on file   Occupational History    Not on file   Tobacco Use    Smoking status: Former     Types: Cigars    Smokeless tobacco: Never    Tobacco comments:     Occasional cigar with relatives (major holidays).   Vaping Use    Vaping Use: Never used   Substance and Sexual Activity    Alcohol use: Yes     Comment: socially- not weekly, states monthly    Drug use: No    Sexual activity: Not on file   Other Topics Concern    Not on file   Social History Narrative    Not on file     Social Determinants of Health     Financial Resource Strain: Low Risk  (2023)    Overall Financial Resource Strain (CARDIA)     Difficulty of Paying Living Expenses: Not hard at all   Food Insecurity: No Food Insecurity (2024)    Hunger Vital Sign     Worried About Running Out of Food in the Last Year: Never true     Ran Out of Food in the Last Year: Never true   Transportation Needs: No Transportation Needs (2024)    PRAPARE - Transportation     Lack of Transportation (Medical): No     Lack of Transportation (Non-Medical): No   Physical Activity: Not on file   Stress: Not on file   Social Connections: Not on file   Intimate Partner Violence: Not on file   Housing Stability: Low Risk  (2024)    Housing Stability Vital Sign     Unable to Pay for Housing in the Last Year: No     Number of Places Lived in the Last Year: 1     Unstable Housing in the Last Year: No           Objective:  CURRENT TEMPERATURE:  Temp: 97.5 °F (36.4 °C)  MAXIMUM TEMPERATURE OVER 24HRS:  Temp (24hrs), Av.5 °F (36.4 °C), Min:97.5 °F (36.4 °C), Max:97.5 °F (36.4 °C)    CURRENT RESPIRATORY RATE:  Respirations: 16  CURRENT PULSE:  Pulse: 
the last 72 hours.   No results for input(s): \"PROT\", \"ALBCAL\", \"ALBPCT\", \"LABALPH\", \"A1PCT\", \"A2PCT\", \"LABBETA\", \"BETAPCT\", \"GAMGLOB\", \"GGPCT\", \"PATH\" in the last 72 hours.    UPEP: No results for input(s): \"TPU\" in the last 72 hours.   Urine Sodium:    No results for input(s): \"KATIA\" in the last 72 hours.     Urine Potassium: No results for input(s): \"KUR\" in the last 72 hours.  Urine Chloride:  No results for input(s): \"CLU\" in the last 72 hours.  Urine Ph:  Invalid input(s): \"PO4U\"  Urine Osmolarity: No results for input(s): \"OSMOU\" in the last 72 hours.  Urine Creatinine:    No results for input(s): \"LABCREA\" in the last 72 hours.    Urine Eosinophils: Invalid input(s): \"EOSU\"  Urine Protein:  No results for input(s): \"TPU\" in the last 72 hours.  Urinalysis:    No results for input(s): \"NITRU\", \"COLORU\", \"PHUR\", \"LABCAST\", \"WBCUA\", \"RBCUA\", \"MUCUS\", \"TRICHOMONAS\", \"YEAST\", \"BACTERIA\", \"CLARITYU\", \"SPECGRAV\", \"LEUKOCYTESUR\", \"UROBILINOGEN\", \"BILIRUBINUR\", \"BLOODU\", \"GLUCOSEU\", \"KETUA\", \"AMORPHOUS\" in the last 72 hours.        Radiology:  Reviewed as available.    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  No stones or obstructive uropathy.    Prior history of mildly elevated LO and double-stranded DNA patient has seen rheumatologist, repeat LO +ve ds DNa abs +, C3 C4 normal.  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

## 2024-01-28 NOTE — PLAN OF CARE
Problem: Discharge Planning  Goal: Discharge to home or other facility with appropriate resources  1/28/2024 0315 by Cici Nolan, RN  Outcome: Progressing  Flowsheets (Taken 1/27/2024 2000)  Discharge to home or other facility with appropriate resources:   Identify barriers to discharge with patient and caregiver   Arrange for needed discharge resources and transportation as appropriate   Identify discharge learning needs (meds, wound care, etc)   Refer to discharge planning if patient needs post-hospital services based on physician order or complex needs related to functional status, cognitive ability or social support system  Possible discharge today.     Problem: Pain  Goal: Verbalizes/displays adequate comfort level or baseline comfort level  1/28/2024 0315 by Cici Nolan, RN  Outcome: Progressing  Pt reports headache pain controlled with meds.     Problem: Skin/Tissue Integrity  Goal: Absence of new skin breakdown  Description: 1.  Monitor for areas of redness and/or skin breakdown  2.  Assess vascular access sites hourly  3.  Every 4-6 hours minimum:  Change oxygen saturation probe site  4.  Every 4-6 hours:  If on nasal continuous positive airway pressure, respiratory therapy assess nares and determine need for appliance change or resting period.  1/28/2024 0315 by Cici Nolan, RN  Outcome: Adequate for Discharge    Problem: Safety - Adult  Goal: Free from fall injury  1/28/2024 0315 by Cici Nolan, RN  Outcome: Progressing  Pt independent and ambulates with steady gait.     Problem: ABCDS Injury Assessment  Goal: Absence of physical injury  1/28/2024 0315 by Cici Nolan, RN  Outcome: Progressing

## 2024-01-28 NOTE — DISCHARGE SUMMARY
Tabs     montelukast 10 MG tablet  Commonly known as: SINGULAIR  Take 1 tablet by mouth daily     sertraline 100 MG tablet  Commonly known as: ZOLOFT     therapeutic multivitamin-minerals tablet     tiZANidine 2 MG tablet  Commonly known as: ZANAFLEX  Take 1 tablet by mouth nightly as needed (muscle tightness)            STOP taking these medications      Culturelle Abdominal Support 4-15 GM-MG Pack     famotidine 40 MG tablet  Commonly known as: PEPCID     losartan 100 MG tablet  Commonly known as: Cozaar     triamcinolone 0.1 % ointment  Commonly known as: KENALOG     Vitamin D3 1.25 MG (74890 UT) Caps               Where to Get Your Medications        These medications were sent to [id number] - [place], [place] - [address] - P [phone] - F [phone] [address], [facility] [address]      Phone: [phone]   doxazosin 4 MG tablet  fluticasone 50 MCG/ACT nasal spray  LORazepam 0.5 MG tablet  metoprolol 100 MG tablet         Consults:  IP CONSULT TO NEPHROLOGY  IP CONSULT TO FAMILY MEDICINE    Significant Diagnostic Studies:  US RENAL COMPLETE    Result Date: 1/25/2024  EXAMINATION: RETROPERITONEAL ULTRASOUND OF THE KIDNEYS AND URINARY BLADDER 1/24/2024 COMPARISON: CT abdomen 9/3/2022 HISTORY: ORDERING SYSTEM PROVIDED HISTORY: UMANG TECHNOLOGIST PROVIDED HISTORY: UMANG FINDINGS: Kidneys: The right kidney measures 9.5 cm in length and the left kidney measures 10.8 cm in length. Both kidneys demonstrate hyperechoic pattern consistent with medical renal disease.  There is cortical atrophy bilaterally. Urinary bladder: Not evaluated.     Bilateral hyperechoic echotexture and cortical atrophy especially on the left, indicating medical renal disease.  No stones or obstructive uropathy.       Disposition:   home    Discharge Instructions:  Activity: activity as tolerated  Diet:  regular diet    Follow up with Demetria Parisi MD in 1 weeks.    Signed:  Manny Liriano

## 2024-01-28 NOTE — CARE COORDINATION
ONGOING DISCHARGE PLAN:    Patient is alert and oriented x4.    Spoke with patient regarding discharge plan and patient confirms that plan is still to return home, denies needs.     Cr is 3.5; nephrology on board. Patient's baseline is 2.7.     Will continue to follow for additional discharge needs.    If patient is discharged prior to next notation, then this note serves as note for discharge by case management.    Electronically signed by Cuca Evans RN on 1/28/2024 at 12:40 PM

## 2024-01-30 NOTE — ADT AUTH CERT
Utilization Reviews       1/27/2024   Last Updated by Aniceto Boyer RN on 1/30/2024 1136     Review Status Created By   In Primary Aniceto Boyer RN       Review Type Associated Date   Continued Stay 1/30/2024      Criteria Review   DATE: 1/27/2024     RELEVANT BASELINES: (lab values, vitals, o2 amount/delivery, etc.)  Room air; baseline creat around 2.7        PERTINENT UPDATES:  Patient seen and examined, no new complains today  Scr improved to 3.7 mg/dl  Sca improved  Good uop.  BP improved     VITALS: 97.5  16  84  155/91  100% room air        ABNL/PERTINENT LABS/RADIOLOGY/DIAGNOSTIC STUDIES:    Latest Reference Range & Units 01/27/24 05:24   Sodium 135 - 144 mmol/L 137   Potassium 3.7 - 5.3 mmol/L 4.0   Chloride 98 - 107 mmol/L 105   CO2 20 - 31 mmol/L 23   BUN,BUNPL 6 - 20 mg/dL 31 (H)   Creatinine 0.7 - 1.2 mg/dL 3.7 (H)   Anion Gap 9 - 17 mmol/L 9   Est, Glom Filt Rate >60 mL/min/1.73m2 22 (L)   Glucose, Random 70 - 99 mg/dL 88   CALCIUM, SERUM, 949370 8.6 - 10.4 mg/dL 8.6            PHYSICAL EXAM:  GENERAL APPEARANCE: Alert and cooperative, and appears to be in no acute distress.  HEAD: normocephalic  EYES:  EOMI. Not pale, anicteric   NOSE:  No nasal discharge.    CARDIAC: Normal S1 and S2. No S3, S4 or murmurs. Rhythm is regular.  LUNGS: Clear to auscultation and percussion without rales, rhonchi, wheezing or diminished breath sounds.  NECK: Neck supple, non-tender   MUSKULOSKELETAL: Adequately aligned spine. No joint erythema or tenderness.   EXTREMITIES: No edema. Peripheral pulses intact.   NEURO: Nonfocal           MD CONSULTS/ASSESSMENT AND PLAN:  Nephrology progress note:'Assessment:   UMANG on CKD 3B, possible cause includes prerenal azotemia versus ATN, due to hypercalcemia, milk-alkali syndrome  other causes includes use of losartan versus progression of chronic kidney disease, nephrocalcinosis from hypercalcemia, scr peaked to 5.2 mg/dl, scr improving to 3.7 mg/dL  Renal US Bilateral

## 2024-01-31 LAB
DEPRECATED S PNEUM5 IGG SER-MCNC: 0.5 U/ML
ENA JO1 IGG SER-ACNC: <0.4 U/ML
ENA SCL70 AB SER IA-ACNC: <0.6 U/ML
ENA SM AB SER-ACNC: 2.1 U/ML
ENA SS-A IGG SER QL: 0.6 U/ML
ENA SS-B IGG SER IA-ACNC: <0.3 U/ML
U1 SNRNP IGG SER IA-ACNC: 0.8 U/ML
U1 SNRNP IGG SER IA-ACNC: 1.7 U/ML

## 2024-02-05 ENCOUNTER — HOSPITAL ENCOUNTER (OUTPATIENT)
Age: 29
Discharge: HOME | End: 2024-02-05
Payer: COMMERCIAL

## 2024-02-05 DIAGNOSIS — N18.9 CHRONIC KIDNEY DISEASE, UNSPECIFIED CKD STAGE: ICD-10-CM

## 2024-02-05 LAB
ALBUMIN SERPL-MCNC: 4.5 G/DL (ref 3.5–5.2)
ALP SERPL-CCNC: 69 U/L (ref 40–129)
ALT SERPL-CCNC: 28 U/L (ref 5–41)
ANION GAP SERPL CALCULATED.3IONS-SCNC: 12 MMOL/L (ref 9–17)
AST SERPL-CCNC: 18 U/L
BILIRUB SERPL-MCNC: 0.7 MG/DL (ref 0.3–1.2)
BUN SERPL-MCNC: 35 MG/DL (ref 6–20)
CALCIUM SERPL-MCNC: 9.8 MG/DL (ref 8.6–10.4)
CHLORIDE SERPL-SCNC: 100 MMOL/L (ref 98–107)
CO2 SERPL-SCNC: 24 MMOL/L (ref 20–31)
CREAT SERPL-MCNC: 3.4 MG/DL (ref 0.7–1.2)
GFR SERPL CREATININE-BSD FRML MDRD: 24 ML/MIN/1.73M2
GLUCOSE SERPL-MCNC: 81 MG/DL (ref 70–99)
POTASSIUM SERPL-SCNC: 4.2 MMOL/L (ref 3.7–5.3)
PROT SERPL-MCNC: 8 G/DL (ref 6.4–8.3)
SODIUM SERPL-SCNC: 136 MMOL/L (ref 135–144)

## 2024-02-05 PROCEDURE — 80053 COMPREHEN METABOLIC PANEL: CPT

## 2024-02-05 PROCEDURE — 36415 COLL VENOUS BLD VENIPUNCTURE: CPT

## 2024-02-26 ENCOUNTER — HOSPITAL ENCOUNTER (OUTPATIENT)
Age: 29
Discharge: HOME | End: 2024-02-26
Payer: COMMERCIAL

## 2024-02-26 LAB
ALBUMIN SERPL-MCNC: 4.5 G/DL (ref 3.5–5.2)
ALP SERPL-CCNC: 81 U/L (ref 40–129)
ALT SERPL-CCNC: 15 U/L (ref 5–41)
ANION GAP SERPL CALCULATED.3IONS-SCNC: 13 MMOL/L (ref 9–17)
AST SERPL-CCNC: 20 U/L
BACTERIA URNS QL MICRO: ABNORMAL
BASOPHILS # BLD: 0 K/UL (ref 0–0.2)
BASOPHILS NFR BLD: 1 % (ref 0–2)
BILIRUB SERPL-MCNC: 0.8 MG/DL (ref 0.3–1.2)
BILIRUB UR QL STRIP: NEGATIVE
BUN SERPL-MCNC: 36 MG/DL (ref 6–20)
CALCIUM SERPL-MCNC: 9.6 MG/DL (ref 8.6–10.4)
CHLORIDE SERPL-SCNC: 97 MMOL/L (ref 98–107)
CLARITY UR: CLEAR
CO2 SERPL-SCNC: 25 MMOL/L (ref 20–31)
COLOR UR: YELLOW
CREAT SERPL-MCNC: 3.2 MG/DL (ref 0.7–1.2)
EOSINOPHIL # BLD: 0.1 K/UL (ref 0–0.4)
EOSINOPHILS RELATIVE PERCENT: 3 % (ref 0–4)
EPI CELLS #/AREA URNS HPF: ABNORMAL /HPF
ERYTHROCYTE [DISTWIDTH] IN BLOOD BY AUTOMATED COUNT: 12.6 % (ref 11.5–14.9)
GFR SERPL CREATININE-BSD FRML MDRD: 26 ML/MIN/1.73M2
GLUCOSE SERPL-MCNC: 87 MG/DL (ref 70–99)
GLUCOSE UR STRIP-MCNC: NEGATIVE MG/DL
HCT VFR BLD AUTO: 36.3 % (ref 41–53)
HGB BLD-MCNC: 12.7 G/DL (ref 13.5–17.5)
HGB UR QL STRIP.AUTO: ABNORMAL
KETONES UR STRIP-MCNC: NEGATIVE MG/DL
LEUKOCYTE ESTERASE UR QL STRIP: NEGATIVE
LYMPHOCYTES NFR BLD: 1.3 K/UL (ref 1–4.8)
LYMPHOCYTES RELATIVE PERCENT: 27 % (ref 24–44)
MCH RBC QN AUTO: 29.9 PG (ref 26–34)
MCHC RBC AUTO-ENTMCNC: 34.9 G/DL (ref 31–37)
MCV RBC AUTO: 85.5 FL (ref 80–100)
MONOCYTES NFR BLD: 0.5 K/UL (ref 0.1–1.3)
MONOCYTES NFR BLD: 9 % (ref 1–7)
NEUTROPHILS NFR BLD: 60 % (ref 36–66)
NEUTS SEG NFR BLD: 3 K/UL (ref 1.3–9.1)
NITRITE UR QL STRIP: NEGATIVE
PH UR STRIP: 6 [PH] (ref 5–8)
PLATELET # BLD AUTO: 213 K/UL (ref 150–450)
PMV BLD AUTO: 6.3 FL (ref 6–12)
POTASSIUM SERPL-SCNC: 4.1 MMOL/L (ref 3.7–5.3)
PROT SERPL-MCNC: 7.7 G/DL (ref 6.4–8.3)
PROT UR STRIP-MCNC: ABNORMAL MG/DL
RBC # BLD AUTO: 4.24 M/UL (ref 4.5–5.9)
RBC #/AREA URNS HPF: ABNORMAL /HPF
SODIUM SERPL-SCNC: 135 MMOL/L (ref 135–144)
SP GR UR STRIP: 1.01 (ref 1–1.03)
UROBILINOGEN UR STRIP-ACNC: NORMAL EU/DL (ref 0–1)
WBC #/AREA URNS HPF: ABNORMAL /HPF
WBC OTHER # BLD: 5 K/UL (ref 3.5–11)

## 2024-02-26 PROCEDURE — 80053 COMPREHEN METABOLIC PANEL: CPT

## 2024-02-26 PROCEDURE — 85025 COMPLETE CBC W/AUTO DIFF WBC: CPT

## 2024-02-26 PROCEDURE — 81001 URINALYSIS AUTO W/SCOPE: CPT

## 2024-02-26 PROCEDURE — 36415 COLL VENOUS BLD VENIPUNCTURE: CPT

## 2024-02-29 PROBLEM — R80.1 PERSISTENT PROTEINURIA: Status: ACTIVE | Noted: 2022-11-30

## 2024-04-01 ENCOUNTER — OFFICE VISIT (OUTPATIENT)
Dept: FAMILY MEDICINE CLINIC | Age: 29
End: 2024-04-01
Payer: COMMERCIAL

## 2024-04-01 VITALS
SYSTOLIC BLOOD PRESSURE: 145 MMHG | OXYGEN SATURATION: 99 % | TEMPERATURE: 98.4 F | DIASTOLIC BLOOD PRESSURE: 103 MMHG | HEART RATE: 88 BPM

## 2024-04-01 DIAGNOSIS — M25.571 PAIN AND SWELLING OF ANKLE, RIGHT: ICD-10-CM

## 2024-04-01 DIAGNOSIS — M25.471 PAIN AND SWELLING OF ANKLE, RIGHT: ICD-10-CM

## 2024-04-01 DIAGNOSIS — S93.401A SPRAIN OF RIGHT ANKLE, UNSPECIFIED LIGAMENT, INITIAL ENCOUNTER: Primary | ICD-10-CM

## 2024-04-01 PROCEDURE — 3080F DIAST BP >= 90 MM HG: CPT | Performed by: NURSE PRACTITIONER

## 2024-04-01 PROCEDURE — 3077F SYST BP >= 140 MM HG: CPT | Performed by: NURSE PRACTITIONER

## 2024-04-01 PROCEDURE — 99214 OFFICE O/P EST MOD 30 MIN: CPT | Performed by: NURSE PRACTITIONER

## 2024-04-01 NOTE — PROGRESS NOTES
[facility] PHYSICIANS [facility], [facility] WALK-IN FAMILY MEDICINE  [address]  [address]  [place] [address]  Dept: [phone]  Dept Fax: [phone]    Ubaldo Sotomayor is a 28 y.o. male who presents to the urgent care today for his medical conditions/complaints as notedbelow.  Ubaldo Sotomayor is c/o of Foot Pain (Right onset today )      HPI:     28 yr old male presents for rt lateral ankle pain   Started while at [facility] approx 1 week ago -- did a lot of walking , sx better,  then today much worse, can barely walk on it. Some swelling, no increased heat or discoloration, denies any known injury  Has had issues with same location in past, had Podiatry referral but never f/u.   CKD - can not take nsaids      Ankle Pain   The incident occurred 5 to 7 days ago. There was no injury mechanism. The pain is present in the right ankle. The quality of the pain is described as aching. The pain is moderate. The pain has been Constant since onset. Pertinent negatives include no inability to bear weight, loss of motion, loss of sensation, muscle weakness, numbness or tingling. He reports no foreign bodies present. The symptoms are aggravated by weight bearing and palpation (standing). The treatment provided mild relief.       Past Medical History:   Diagnosis Date    Abnormal EKG     Anxiety     History of chicken pox 2002    Hyperlipidemia     Hypertension     OCD (obsessive compulsive disorder)     Stage 3a chronic kidney disease (HCC) 06/15/2022        Current Outpatient Medications   Medication Sig Dispense Refill    doxazosin (CARDURA) 4 MG tablet Take 1 tablet by mouth at bedtime 90 tablet 3    fluticasone (FLONASE) 50 MCG/ACT nasal spray 1 spray by Each Nostril route daily 16 g 3    carvedilol (COREG) 25 MG tablet Take 1 tablet by mouth 2 times daily 60 tablet 3    Melatonin 10 MG TABS Take 10 mg by mouth nightly as needed      amLODIPine (NORVASC) 10 MG tablet Take 1

## 2024-04-02 ENCOUNTER — HOSPITAL ENCOUNTER (OUTPATIENT)
Dept: GENERAL RADIOLOGY | Age: 29
Discharge: HOME | End: 2024-04-04
Payer: COMMERCIAL

## 2024-04-02 ENCOUNTER — HOSPITAL ENCOUNTER (OUTPATIENT)
Age: 29
Discharge: HOME | End: 2024-04-04
Payer: COMMERCIAL

## 2024-04-02 DIAGNOSIS — M25.471 PAIN AND SWELLING OF ANKLE, RIGHT: ICD-10-CM

## 2024-04-02 DIAGNOSIS — M25.571 PAIN AND SWELLING OF ANKLE, RIGHT: ICD-10-CM

## 2024-04-02 DIAGNOSIS — M25.471 ANKLE EFFUSION, RIGHT: Primary | ICD-10-CM

## 2024-04-02 DIAGNOSIS — S93.401A SPRAIN OF RIGHT ANKLE, UNSPECIFIED LIGAMENT, INITIAL ENCOUNTER: ICD-10-CM

## 2024-04-02 PROCEDURE — 73610 X-RAY EXAM OF ANKLE: CPT

## 2024-04-04 ENCOUNTER — OFFICE VISIT (OUTPATIENT)
Dept: PODIATRY | Age: 29
End: 2024-04-04
Payer: COMMERCIAL

## 2024-04-04 VITALS — WEIGHT: 200 LBS | BODY MASS INDEX: 28 KG/M2 | HEIGHT: 71 IN

## 2024-04-04 DIAGNOSIS — R60.0 EDEMA, LOWER EXTREMITY: ICD-10-CM

## 2024-04-04 DIAGNOSIS — M25.571 ACUTE RIGHT ANKLE PAIN: ICD-10-CM

## 2024-04-04 DIAGNOSIS — S96.911A STRAIN OF RIGHT ANKLE, INITIAL ENCOUNTER: Primary | ICD-10-CM

## 2024-04-04 DIAGNOSIS — M25.871 IMPINGEMENT SYNDROME OF RIGHT ANKLE: ICD-10-CM

## 2024-04-04 PROCEDURE — 99203 OFFICE O/P NEW LOW 30 MIN: CPT | Performed by: PODIATRIST

## 2024-04-04 PROCEDURE — L4360 PNEUMAT WALKING BOOT PRE CST: HCPCS | Performed by: PODIATRIST

## 2024-04-04 ASSESSMENT — ENCOUNTER SYMPTOMS
COLOR CHANGE: 0
BACK PAIN: 0
SHORTNESS OF BREATH: 0
NAUSEA: 0
DIARRHEA: 0

## 2024-04-04 NOTE — PROGRESS NOTES
along with the list of the twenty-one (21) Durable Medical Equipment Supplier Guidelines. All patient questions were answered satisfactorily.  The patient was instructed on proper rest, ice, compression, and elevation to the right lower extremity.  3. Contact office with any questions/problems/concerns.  Return in about 3 weeks (around 4/25/2024) for Evaluation of strain right ankle.   4/4/2024      Jw Treadwell DPM

## 2024-04-23 ENCOUNTER — OFFICE VISIT (OUTPATIENT)
Dept: PODIATRY | Age: 29
End: 2024-04-23
Payer: COMMERCIAL

## 2024-04-23 VITALS — HEIGHT: 71 IN | WEIGHT: 200 LBS | BODY MASS INDEX: 28 KG/M2

## 2024-04-23 DIAGNOSIS — R60.0 EDEMA, LOWER EXTREMITY: ICD-10-CM

## 2024-04-23 DIAGNOSIS — M25.571 ACUTE RIGHT ANKLE PAIN: ICD-10-CM

## 2024-04-23 DIAGNOSIS — S96.911A STRAIN OF RIGHT ANKLE, INITIAL ENCOUNTER: Primary | ICD-10-CM

## 2024-04-23 PROCEDURE — 99213 OFFICE O/P EST LOW 20 MIN: CPT | Performed by: PODIATRIST

## 2024-04-23 ASSESSMENT — ENCOUNTER SYMPTOMS
BACK PAIN: 0
COLOR CHANGE: 0
DIARRHEA: 0
SHORTNESS OF BREATH: 0
NAUSEA: 0

## 2024-04-23 NOTE — PROGRESS NOTES
[facility] Podiatry  Return Patient Progress Note    Subjective: Ubaldo Sotomayor 28 y.o. male that presents for follow up evaluation of strain to the right ankle.   Chief Complaint   Patient presents with    Ankle Pain     Follow up right ankle, doing better     Patient's treatment thus far has included CAM walker, RICE.  Pain is rated 4 out of 10 and is described as intermittent and very infrequently. Patient has been following my prescribed course of therapy as instructed.     Review of Systems   Constitutional:  Negative for activity change, appetite change, chills, diaphoresis, fatigue and fever.   Respiratory:  Negative for shortness of breath.    Cardiovascular:  Negative for leg swelling.   Gastrointestinal:  Negative for diarrhea and nausea.   Endocrine: Negative for cold intolerance, heat intolerance and polyuria.   Musculoskeletal:  Positive for gait problem and joint swelling. Negative for arthralgias, back pain and myalgias.   Skin:  Negative for color change, pallor, rash and wound.   Allergic/Immunologic: Negative for environmental allergies and food allergies.   Neurological:  Negative for dizziness, weakness, light-headedness and numbness.   Hematological:  Does not bruise/bleed easily.   Psychiatric/Behavioral:  Negative for behavioral problems, confusion and self-injury. The patient is not nervous/anxious.        Objective: Clinical evaluation of the patient reveals minimal remaining edema to the right foot and ankle.  There remains pain with palpation to the ATFL and CFL of the right ankle.  There remains pain with palpation along the course of the peroneal tendons of the right ankle.  There is no pain with palpation to the fibular malleolus of the right ankle.  There is pain to the right lateral ankle with plantarflexion and inversion of the right foot.  Muscle strength evaluation to the right lateral muscle group remains painful.    Assessment:    Diagnosis Orders   1. Strain of right

## 2024-05-07 ENCOUNTER — OFFICE VISIT (OUTPATIENT)
Dept: PODIATRY | Age: 29
End: 2024-05-07
Payer: COMMERCIAL

## 2024-05-07 VITALS — WEIGHT: 200 LBS | HEIGHT: 71 IN | BODY MASS INDEX: 28 KG/M2

## 2024-05-07 DIAGNOSIS — S96.911D STRAIN OF RIGHT ANKLE, SUBSEQUENT ENCOUNTER: Primary | ICD-10-CM

## 2024-05-07 DIAGNOSIS — M25.871 IMPINGEMENT SYNDROME OF RIGHT ANKLE: ICD-10-CM

## 2024-05-07 DIAGNOSIS — R60.0 EDEMA, LOWER EXTREMITY: ICD-10-CM

## 2024-05-07 DIAGNOSIS — M25.571 ACUTE RIGHT ANKLE PAIN: ICD-10-CM

## 2024-05-07 PROCEDURE — 99213 OFFICE O/P EST LOW 20 MIN: CPT | Performed by: PODIATRIST

## 2024-05-07 PROCEDURE — L4350 ANKLE CONTROL ORTHO PRE OTS: HCPCS | Performed by: PODIATRIST

## 2024-05-07 NOTE — PROGRESS NOTES
[facility] Podiatry  Return Patient Progress Note    Subjective: Ubaldo Sotomayor 28 y.o. male that presents for follow up evaluation of sprain right ankle.  Chief Complaint   Patient presents with    Ankle Pain     Follow up right ankle, doing better but not 100%     Patient's treatment thus far has included CAM Walker, PHOEBE.  Pain is rated 3 out of 10 and is described as intermittent.  Patient states that his pain is very infrequent at this time.  Patient has been following my prescribed course of therapy as instructed.     Review of Systems   Constitutional:  Negative for activity change, appetite change, chills, diaphoresis, fatigue and fever.   Respiratory:  Negative for shortness of breath.    Cardiovascular:  Negative for leg swelling.   Gastrointestinal:  Negative for diarrhea and nausea.   Endocrine: Negative for cold intolerance, heat intolerance and polyuria.   Musculoskeletal:  Positive for gait problem and joint swelling. Negative for arthralgias, back pain and myalgias.   Skin:  Negative for color change, pallor, rash and wound.   Allergic/Immunologic: Negative for environmental allergies and food allergies.   Neurological:  Negative for dizziness, weakness, light-headedness and numbness.   Hematological:  Does not bruise/bleed easily.   Psychiatric/Behavioral:  Negative for behavioral problems, confusion and self-injury. The patient is not nervous/anxious.        Objective: Clinical evaluation of the patient reveals only minimal edema to the right foot and ankle. There remains pain with palpation to the ATFL and CFL of the right ankle. There is only minimal pain with palpation along the course of the peroneal tendons of the right ankle. There is no pain with palpation to the fibular malleolus of the right ankle. There is no pain today to the right lateral ankle with plantarflexion and inversion of the right foot. Muscle strength evaluation to the right lateral muscle group is not painful today.

## 2024-05-13 ASSESSMENT — ENCOUNTER SYMPTOMS
COLOR CHANGE: 0
BACK PAIN: 0
NAUSEA: 0
DIARRHEA: 0
SHORTNESS OF BREATH: 0

## 2024-05-21 ENCOUNTER — OFFICE VISIT (OUTPATIENT)
Dept: PODIATRY | Age: 29
End: 2024-05-21
Payer: COMMERCIAL

## 2024-05-21 VITALS — BODY MASS INDEX: 28 KG/M2 | HEIGHT: 71 IN | WEIGHT: 200 LBS

## 2024-05-21 DIAGNOSIS — R60.0 EDEMA, LOWER EXTREMITY: ICD-10-CM

## 2024-05-21 DIAGNOSIS — M25.571 ACUTE RIGHT ANKLE PAIN: ICD-10-CM

## 2024-05-21 DIAGNOSIS — S96.911D STRAIN OF RIGHT ANKLE, SUBSEQUENT ENCOUNTER: Primary | ICD-10-CM

## 2024-05-21 PROCEDURE — 99213 OFFICE O/P EST LOW 20 MIN: CPT | Performed by: PODIATRIST

## 2024-05-21 ASSESSMENT — ENCOUNTER SYMPTOMS
SHORTNESS OF BREATH: 0
NAUSEA: 0
BACK PAIN: 0
DIARRHEA: 0
COLOR CHANGE: 0

## 2024-05-21 NOTE — PROGRESS NOTES
Diagnosis Orders   1. Strain of right ankle, subsequent encounter        2. Edema, lower extremity        3. Acute right ankle pain              Plan: 1. Clinical evaluation of the patient. 2.  Patient advised to continue with use of his ankle brace at all times when weightbearing.  3. Return in about 2 weeks (around 6/4/2024) for Evaluation of sprain right ankle.   5/21/2024      Jw Treadwell DPM

## 2024-06-04 ENCOUNTER — OFFICE VISIT (OUTPATIENT)
Dept: PODIATRY | Age: 29
End: 2024-06-04
Payer: COMMERCIAL

## 2024-06-04 VITALS — WEIGHT: 200 LBS | BODY MASS INDEX: 28 KG/M2 | HEIGHT: 71 IN

## 2024-06-04 DIAGNOSIS — M25.571 ACUTE RIGHT ANKLE PAIN: ICD-10-CM

## 2024-06-04 DIAGNOSIS — S96.911D STRAIN OF RIGHT ANKLE, SUBSEQUENT ENCOUNTER: Primary | ICD-10-CM

## 2024-06-04 DIAGNOSIS — R60.0 EDEMA, LOWER EXTREMITY: ICD-10-CM

## 2024-06-04 PROCEDURE — 99213 OFFICE O/P EST LOW 20 MIN: CPT | Performed by: PODIATRIST

## 2024-06-10 ASSESSMENT — ENCOUNTER SYMPTOMS
NAUSEA: 0
BACK PAIN: 0
SHORTNESS OF BREATH: 0
COLOR CHANGE: 0
DIARRHEA: 0

## 2024-06-10 NOTE — PROGRESS NOTES
[facility] Podiatry  Return Patient Progress Note    Subjective: Ubaldo Sotomayor 29 y.o. male that presents for follow up evaluation of sprain right ankle  Chief Complaint   Patient presents with    Ankle Pain     Follow up right ankle, doing better     Patient's treatment thus far has included ankle brace.  Pain is rated 1 out of 10 and is described as intermittent and much less frequently. Patient has been following my prescribed course of therapy as instructed.     Review of Systems   Constitutional:  Negative for activity change, appetite change, chills, diaphoresis, fatigue and fever.   Respiratory:  Negative for shortness of breath.    Cardiovascular:  Negative for leg swelling.   Gastrointestinal:  Negative for diarrhea and nausea.   Endocrine: Negative for cold intolerance, heat intolerance and polyuria.   Musculoskeletal:  Positive for joint swelling. Negative for arthralgias, back pain, gait problem and myalgias.   Skin:  Negative for color change, pallor, rash and wound.   Allergic/Immunologic: Negative for environmental allergies and food allergies.   Neurological:  Negative for dizziness, weakness, light-headedness and numbness.   Hematological:  Does not bruise/bleed easily.   Psychiatric/Behavioral:  Negative for behavioral problems, confusion and self-injury. The patient is not nervous/anxious.        Objective: Clinical evaluation of the patient reveals no evidence today of edema to the right foot or ankle. There remains only minimal pain with palpation to the ATFL and CFL of the right ankle. There is no pain today with palpation along the course of the peroneal tendons of the right ankle. There is no pain with palpation to the fibular malleolus of the right ankle. There is no pain today to the right lateral ankle with plantarflexion and inversion of the right foot. Muscle strength evaluation to the right lateral muscle group is not painful today.      Assessment:    Diagnosis Orders   1.

## 2024-06-20 ENCOUNTER — HOSPITAL ENCOUNTER (OUTPATIENT)
Age: 29
Discharge: HOME | End: 2024-06-20
Payer: COMMERCIAL

## 2024-06-20 DIAGNOSIS — D63.1 ANEMIA IN STAGE 4 CHRONIC KIDNEY DISEASE (HCC): ICD-10-CM

## 2024-06-20 DIAGNOSIS — R80.9 PROTEINURIA, UNSPECIFIED TYPE: ICD-10-CM

## 2024-06-20 DIAGNOSIS — E55.9 VITAMIN D DEFICIENCY, UNSPECIFIED: ICD-10-CM

## 2024-06-20 DIAGNOSIS — I10 ESSENTIAL HYPERTENSION: ICD-10-CM

## 2024-06-20 DIAGNOSIS — N18.4 ANEMIA IN STAGE 4 CHRONIC KIDNEY DISEASE (HCC): ICD-10-CM

## 2024-06-20 DIAGNOSIS — N18.4 CKD (CHRONIC KIDNEY DISEASE), STAGE IV (HCC): ICD-10-CM

## 2024-06-20 DIAGNOSIS — N18.4 BENIGN HYPERTENSION WITH CKD (CHRONIC KIDNEY DISEASE) STAGE IV (HCC): ICD-10-CM

## 2024-06-20 DIAGNOSIS — I12.9 BENIGN HYPERTENSION WITH CKD (CHRONIC KIDNEY DISEASE) STAGE IV (HCC): ICD-10-CM

## 2024-06-20 LAB
25(OH)D3 SERPL-MCNC: 31.1 NG/ML (ref 30–100)
ANION GAP SERPL CALCULATED.3IONS-SCNC: 11 MMOL/L (ref 9–17)
BACTERIA URNS QL MICRO: ABNORMAL
BASOPHILS # BLD: 0 K/UL (ref 0–0.2)
BASOPHILS NFR BLD: 1 % (ref 0–2)
BILIRUB UR QL STRIP: NEGATIVE
BUN SERPL-MCNC: 33 MG/DL (ref 6–20)
CALCIUM SERPL-MCNC: 9.2 MG/DL (ref 8.6–10.4)
CASTS #/AREA URNS LPF: ABNORMAL /LPF
CHLORIDE SERPL-SCNC: 98 MMOL/L (ref 98–107)
CLARITY UR: CLEAR
CO2 SERPL-SCNC: 24 MMOL/L (ref 20–31)
COLOR UR: YELLOW
CREAT SERPL-MCNC: 3.1 MG/DL (ref 0.7–1.2)
EOSINOPHIL # BLD: 0.2 K/UL (ref 0–0.4)
EOSINOPHILS RELATIVE PERCENT: 3 % (ref 0–4)
EPI CELLS #/AREA URNS HPF: ABNORMAL /HPF
ERYTHROCYTE [DISTWIDTH] IN BLOOD BY AUTOMATED COUNT: 13.2 % (ref 11.5–14.9)
GFR, ESTIMATED: 27 ML/MIN/1.73M2
GLUCOSE SERPL-MCNC: 84 MG/DL (ref 70–99)
GLUCOSE UR STRIP-MCNC: NEGATIVE MG/DL
HCT VFR BLD AUTO: 38.2 % (ref 41–53)
HGB BLD-MCNC: 12.4 G/DL (ref 13.5–17.5)
HGB UR QL STRIP.AUTO: ABNORMAL
KETONES UR STRIP-MCNC: NEGATIVE MG/DL
LEUKOCYTE ESTERASE UR QL STRIP: NEGATIVE
LYMPHOCYTES NFR BLD: 1.3 K/UL (ref 1–4.8)
LYMPHOCYTES RELATIVE PERCENT: 24 % (ref 24–44)
MAGNESIUM SERPL-MCNC: 2.2 MG/DL (ref 1.6–2.6)
MCH RBC QN AUTO: 26.4 PG (ref 26–34)
MCHC RBC AUTO-ENTMCNC: 32.6 G/DL (ref 31–37)
MCV RBC AUTO: 80.9 FL (ref 80–100)
MONOCYTES NFR BLD: 0.6 K/UL (ref 0.1–1.3)
MONOCYTES NFR BLD: 11 % (ref 1–7)
NEUTROPHILS NFR BLD: 61 % (ref 36–66)
NEUTS SEG NFR BLD: 3.3 K/UL (ref 1.3–9.1)
NITRITE UR QL STRIP: NEGATIVE
PH UR STRIP: 5.5 [PH] (ref 5–8)
PHOSPHATE SERPL-MCNC: 4 MG/DL (ref 2.5–4.5)
PLATELET # BLD AUTO: 233 K/UL (ref 150–450)
PMV BLD AUTO: 6.6 FL (ref 6–12)
POTASSIUM SERPL-SCNC: 4.9 MMOL/L (ref 3.7–5.3)
PROT UR STRIP-MCNC: ABNORMAL MG/DL
RBC # BLD AUTO: 4.72 M/UL (ref 4.5–5.9)
RBC #/AREA URNS HPF: ABNORMAL /HPF
SODIUM SERPL-SCNC: 133 MMOL/L (ref 135–144)
SP GR UR STRIP: 1.01 (ref 1–1.03)
TSH SERPL DL<=0.05 MIU/L-ACNC: 1.61 UIU/ML (ref 0.3–5)
UROBILINOGEN UR STRIP-ACNC: NORMAL EU/DL (ref 0–1)
WBC #/AREA URNS HPF: ABNORMAL /HPF
WBC OTHER # BLD: 5.4 K/UL (ref 3.5–11)

## 2024-06-20 PROCEDURE — 85025 COMPLETE CBC W/AUTO DIFF WBC: CPT

## 2024-06-20 PROCEDURE — 36415 COLL VENOUS BLD VENIPUNCTURE: CPT

## 2024-06-20 PROCEDURE — 84443 ASSAY THYROID STIM HORMONE: CPT

## 2024-06-20 PROCEDURE — 81001 URINALYSIS AUTO W/SCOPE: CPT

## 2024-06-20 PROCEDURE — 84100 ASSAY OF PHOSPHORUS: CPT

## 2024-06-20 PROCEDURE — 83735 ASSAY OF MAGNESIUM: CPT

## 2024-06-20 PROCEDURE — 80048 BASIC METABOLIC PNL TOTAL CA: CPT

## 2024-06-20 PROCEDURE — 82306 VITAMIN D 25 HYDROXY: CPT

## 2024-06-27 ENCOUNTER — HOSPITAL ENCOUNTER (OUTPATIENT)
Age: 29
Discharge: HOME | End: 2024-06-27
Payer: COMMERCIAL

## 2024-06-27 DIAGNOSIS — I10 ESSENTIAL HYPERTENSION: ICD-10-CM

## 2024-06-27 LAB
CHOLEST SERPL-MCNC: 137 MG/DL
CHOLESTEROL/HDL RATIO: 4.3
HDLC SERPL-MCNC: 32 MG/DL
LDLC SERPL CALC-MCNC: 78 MG/DL (ref 0–130)
TRIGL SERPL-MCNC: 136 MG/DL

## 2024-06-27 PROCEDURE — 80061 LIPID PANEL: CPT

## 2024-06-27 PROCEDURE — 36415 COLL VENOUS BLD VENIPUNCTURE: CPT

## 2024-07-08 ENCOUNTER — HOSPITAL ENCOUNTER (OUTPATIENT)
Age: 29
Setting detail: SPECIMEN
Discharge: HOME | End: 2024-07-08
Payer: COMMERCIAL

## 2024-07-08 ENCOUNTER — OFFICE VISIT (OUTPATIENT)
Dept: FAMILY MEDICINE CLINIC | Age: 29
End: 2024-07-08
Payer: COMMERCIAL

## 2024-07-08 VITALS
BODY MASS INDEX: 27.72 KG/M2 | OXYGEN SATURATION: 99 % | WEIGHT: 198 LBS | SYSTOLIC BLOOD PRESSURE: 141 MMHG | HEIGHT: 71 IN | TEMPERATURE: 97.3 F | HEART RATE: 83 BPM | DIASTOLIC BLOOD PRESSURE: 92 MMHG

## 2024-07-08 DIAGNOSIS — R11.0 NAUSEA: ICD-10-CM

## 2024-07-08 DIAGNOSIS — R19.7 DIARRHEA OF PRESUMED INFECTIOUS ORIGIN: Primary | ICD-10-CM

## 2024-07-08 LAB
C DIFF GDH + TOXINS A+B STL QL IA.RAPID: NEGATIVE
SPECIMEN DESCRIPTION: NORMAL

## 2024-07-08 PROCEDURE — 87506 IADNA-DNA/RNA PROBE TQ 6-11: CPT

## 2024-07-08 PROCEDURE — 87449 NOS EACH ORGANISM AG IA: CPT

## 2024-07-08 PROCEDURE — 3080F DIAST BP >= 90 MM HG: CPT

## 2024-07-08 PROCEDURE — 3077F SYST BP >= 140 MM HG: CPT

## 2024-07-08 PROCEDURE — 99214 OFFICE O/P EST MOD 30 MIN: CPT

## 2024-07-08 PROCEDURE — 87324 CLOSTRIDIUM AG IA: CPT

## 2024-07-08 RX ORDER — ONDANSETRON 4 MG/1
4 TABLET, ORALLY DISINTEGRATING ORAL 3 TIMES DAILY PRN
Qty: 6 TABLET | Refills: 0 | Status: SHIPPED | OUTPATIENT
Start: 2024-07-08

## 2024-07-08 RX ORDER — AZITHROMYCIN 500 MG/1
500 TABLET, FILM COATED ORAL DAILY
Qty: 3 TABLET | Refills: 0 | Status: SHIPPED | OUTPATIENT
Start: 2024-07-08 | End: 2024-07-11

## 2024-07-08 ASSESSMENT — ENCOUNTER SYMPTOMS
BELCHING: 0
VOMITING: 0
HEMATOCHEZIA: 0
FLATUS: 0
CONSTIPATION: 0
CRAMPS: 1
NAUSEA: 1
EYE REDNESS: 0
EYE ITCHING: 0
EYE PAIN: 0
ABDOMINAL PAIN: 1
BLOATING: 0
COUGH: 0
DIARRHEA: 1

## 2024-07-08 ASSESSMENT — CROHNS DISEASE ACTIVITY INDEX (CDAI): CDAI SCORE: 0

## 2024-07-08 NOTE — PROGRESS NOTES
[facility] PHYSICIANS [facility], [facility] WALK-IN FAMILY MEDICINE  [address]  [address]  [facility] [address]  Dept: [phone]  Dept Fax: [phone]    Ubaldo Sotomayor is a 29 y.o. male who presents to the urgent care today for his medical conditions/complaints as notedbelow.  Ubaldo Sotomayor is c/o of Abdominal Cramping (Abdominal cramping, nausea, fatigue, diarrhea x3 days)      HPI:     Patient presents to the Walk In Clinic for evaluation of abdominal cramping with diarrhea, x 3 days. Patient takes probiotics daily.   Patient has CKD, follows with nephrology.       Abdominal Cramping  This is a new problem. Episode onset: in the past 3 days. The onset quality is sudden. The problem occurs constantly. The problem has been unchanged. The pain is located in the RLQ and LLQ. The quality of the pain is cramping and aching. The abdominal pain does not radiate. Associated symptoms include diarrhea and nausea. Pertinent negatives include no arthralgias, belching, constipation, dysuria, fever, flatus, frequency, headaches, hematochezia, hematuria, melena, myalgias, vomiting or weight loss. The pain is relieved by Nothing. He has tried nothing for the symptoms. There is no history of Crohn's disease, gallstones, GERD, irritable bowel syndrome, pancreatitis, PUD or ulcerative colitis.   Diarrhea   This is a new problem. Episode onset: in the past 3 days. The problem occurs more than 10 times per day. The problem has been unchanged. The stool consistency is described as Mucous and watery. Associated symptoms include abdominal pain. Pertinent negatives include no arthralgias, bloating, chills, coughing, fever, headaches, increased  flatus, myalgias, sweats, URI, vomiting or weight loss. Nothing aggravates the symptoms. There are no known risk factors. He has tried nothing for the symptoms. There is no history of irritable bowel syndrome.       Past Medical History:   Diagnosis Date

## 2024-07-09 LAB
CAMPYLOBACTER DNA SPEC NAA+PROBE: NORMAL
ETEC ELTA+ESTB GENES STL QL NAA+PROBE: NORMAL
P SHIGELLOIDES DNA STL QL NAA+PROBE: NORMAL
SALMONELLA DNA SPEC QL NAA+PROBE: NORMAL
SHIGA TOXIN STX GENE SPEC NAA+PROBE: NORMAL
SHIGELLA DNA SPEC QL NAA+PROBE: NORMAL
SPECIMEN DESCRIPTION: NORMAL
V CHOL+PARA RFBL+TRKH+TNAA STL QL NAA+PR: NORMAL
Y ENTERO RECN STL QL NAA+PROBE: NORMAL

## 2024-08-23 ENCOUNTER — OFFICE VISIT (OUTPATIENT)
Dept: FAMILY MEDICINE CLINIC | Age: 29
End: 2024-08-23
Payer: COMMERCIAL

## 2024-08-23 VITALS
DIASTOLIC BLOOD PRESSURE: 93 MMHG | OXYGEN SATURATION: 98 % | TEMPERATURE: 97.2 F | SYSTOLIC BLOOD PRESSURE: 143 MMHG | HEART RATE: 79 BPM

## 2024-08-23 DIAGNOSIS — A08.4 VIRAL GASTROENTERITIS: Primary | ICD-10-CM

## 2024-08-23 PROCEDURE — 3077F SYST BP >= 140 MM HG: CPT

## 2024-08-23 PROCEDURE — 3080F DIAST BP >= 90 MM HG: CPT

## 2024-08-23 PROCEDURE — 99213 OFFICE O/P EST LOW 20 MIN: CPT

## 2024-08-23 RX ORDER — ONDANSETRON 4 MG/1
4 TABLET, FILM COATED ORAL 3 TIMES DAILY PRN
Qty: 9 TABLET | Refills: 0 | Status: SHIPPED | OUTPATIENT
Start: 2024-08-23

## 2024-08-23 ASSESSMENT — ENCOUNTER SYMPTOMS
FLATUS: 1
DIARRHEA: 1
BLOATING: 0
NAUSEA: 1
CHEST TIGHTNESS: 0
COUGH: 0
VOMITING: 0
SHORTNESS OF BREATH: 0
ABDOMINAL PAIN: 0
ABDOMINAL DISTENTION: 0
CONSTIPATION: 0
BLOOD IN STOOL: 0

## 2024-08-23 NOTE — PROGRESS NOTES
[facility] PHYSICIANS [facility] WALK-IN FAMILY MEDICINE  [address]  [address]  [facility] [address]  Dept: [phone]  Dept Fax: [phone]    Ubaldo Sotomayor is a 29 y.o. male who presents to the urgent care today for his medical conditions/complaints as notedbelow.  Ubaldo Sotomayor is c/o of Diarrhea (Onset for the last few days, watery stools, some nausea. )      HPI:     Patient presents to the Walk In Clinic for evaluation of diarrhea, onset 4 days      Diarrhea   This is a new problem. Episode onset: in the past 4 days. The problem occurs 5 to 10 times per day. The problem has been unchanged. The stool consistency is described as Watery. The patient states that diarrhea awakens him from sleep. Associated symptoms include increased flatus. Pertinent negatives include no abdominal pain, arthralgias, bloating, chills, coughing, fever, headaches, myalgias, sweats, URI or vomiting. Nothing aggravates the symptoms. There are no known risk factors. He has tried nothing for the symptoms. There is no history of bowel resection, inflammatory bowel disease, irritable bowel syndrome or a recent abdominal surgery.       Past Medical History:   Diagnosis Date    Abnormal EKG     Anxiety     History of chicken pox 2002    Hyperlipidemia     Hypertension     OCD (obsessive compulsive disorder)     Stage 3a chronic kidney disease (HCC) 06/15/2022        Current Outpatient Medications   Medication Sig Dispense Refill    ondansetron (ZOFRAN) 4 MG tablet Take 1 tablet by mouth 3 times daily as needed for Nausea or Vomiting 9 tablet 0    montelukast (SINGULAIR) 10 MG tablet Take 1 tablet by mouth daily 90 tablet 1    atorvastatin (LIPITOR) 20 MG tablet Take 1 tablet by mouth daily 90 tablet 1    amLODIPine (NORVASC) 10 MG tablet Take 1 tablet by mouth daily 90 tablet 1    valsartan (DIOVAN) 80 MG tablet Take 1 tablet by mouth daily 90 tablet 3    carvedilol (COREG) 25 MG tablet

## 2024-08-26 ENCOUNTER — HOSPITAL ENCOUNTER (OUTPATIENT)
Age: 29
Discharge: HOME | End: 2024-08-26
Payer: COMMERCIAL

## 2024-08-26 DIAGNOSIS — I12.9 BENIGN HYPERTENSION WITH CKD (CHRONIC KIDNEY DISEASE) STAGE IV (HCC): ICD-10-CM

## 2024-08-26 DIAGNOSIS — N18.4 BENIGN HYPERTENSION WITH CKD (CHRONIC KIDNEY DISEASE) STAGE IV (HCC): ICD-10-CM

## 2024-08-26 DIAGNOSIS — D63.1 ANEMIA IN STAGE 4 CHRONIC KIDNEY DISEASE (HCC): ICD-10-CM

## 2024-08-26 DIAGNOSIS — N18.4 CKD (CHRONIC KIDNEY DISEASE), STAGE IV (HCC): ICD-10-CM

## 2024-08-26 DIAGNOSIS — N18.4 ANEMIA IN STAGE 4 CHRONIC KIDNEY DISEASE (HCC): ICD-10-CM

## 2024-08-26 DIAGNOSIS — R80.9 PROTEINURIA, UNSPECIFIED TYPE: ICD-10-CM

## 2024-08-26 LAB
ANION GAP SERPL CALCULATED.3IONS-SCNC: 11 MMOL/L (ref 9–17)
BASOPHILS # BLD: 0 K/UL (ref 0–0.2)
BASOPHILS NFR BLD: 0 % (ref 0–2)
BUN SERPL-MCNC: 34 MG/DL (ref 6–20)
CALCIUM SERPL-MCNC: 8.7 MG/DL (ref 8.6–10.4)
CHLORIDE SERPL-SCNC: 97 MMOL/L (ref 98–107)
CO2 SERPL-SCNC: 21 MMOL/L (ref 20–31)
CREAT SERPL-MCNC: 3.1 MG/DL (ref 0.7–1.2)
EOSINOPHIL # BLD: 1.85 K/UL (ref 0–0.4)
EOSINOPHILS RELATIVE PERCENT: 25 % (ref 0–4)
ERYTHROCYTE [DISTWIDTH] IN BLOOD BY AUTOMATED COUNT: 13.7 % (ref 11.5–14.9)
GFR, ESTIMATED: 27 ML/MIN/1.73M2
GLUCOSE SERPL-MCNC: 86 MG/DL (ref 70–99)
HCT VFR BLD AUTO: 38.7 % (ref 41–53)
HGB BLD-MCNC: 13.1 G/DL (ref 13.5–17.5)
LYMPHOCYTES NFR BLD: 2 K/UL (ref 1–4.8)
LYMPHOCYTES RELATIVE PERCENT: 27 % (ref 24–44)
MCH RBC QN AUTO: 28.2 PG (ref 26–34)
MCHC RBC AUTO-ENTMCNC: 33.9 G/DL (ref 31–37)
MCV RBC AUTO: 83.2 FL (ref 80–100)
MONOCYTES NFR BLD: 0.37 K/UL (ref 0.1–1.3)
MONOCYTES NFR BLD: 5 % (ref 1–7)
MORPHOLOGY: ABNORMAL
MORPHOLOGY: ABNORMAL
NEUTROPHILS NFR BLD: 43 % (ref 36–66)
NEUTS SEG NFR BLD: 3.18 K/UL (ref 1.3–9.1)
PLATELET # BLD AUTO: 229 K/UL (ref 150–450)
PMV BLD AUTO: 6.5 FL (ref 6–12)
POTASSIUM SERPL-SCNC: 5.2 MMOL/L (ref 3.7–5.3)
RBC # BLD AUTO: 4.65 M/UL (ref 4.5–5.9)
RETICS # AUTO: 0.04 M/UL (ref 0.02–0.1)
RETICS/RBC NFR AUTO: 0.8 % (ref 0.5–2)
SODIUM SERPL-SCNC: 129 MMOL/L (ref 135–144)
WBC OTHER # BLD: 7.4 K/UL (ref 3.5–11)

## 2024-08-26 PROCEDURE — 36415 COLL VENOUS BLD VENIPUNCTURE: CPT

## 2024-08-26 PROCEDURE — 80048 BASIC METABOLIC PNL TOTAL CA: CPT

## 2024-08-26 PROCEDURE — 85025 COMPLETE CBC W/AUTO DIFF WBC: CPT

## 2024-08-26 PROCEDURE — 85045 AUTOMATED RETICULOCYTE COUNT: CPT

## 2024-08-27 LAB
PATH REV BLD -IMP: NORMAL
SURGICAL PATHOLOGY REPORT: NORMAL

## 2025-01-23 ENCOUNTER — E-VISIT (OUTPATIENT)
Dept: PRIMARY CARE CLINIC | Age: 30
End: 2025-01-23

## 2025-01-23 DIAGNOSIS — J06.9 UPPER RESPIRATORY TRACT INFECTION, UNSPECIFIED TYPE: Primary | ICD-10-CM

## 2025-01-23 RX ORDER — DOXYCYCLINE 100 MG/1
100 CAPSULE ORAL 2 TIMES DAILY
Qty: 20 CAPSULE | Refills: 0 | Status: SHIPPED | OUTPATIENT
Start: 2025-01-23 | End: 2025-02-02

## 2025-01-23 ASSESSMENT — LIFESTYLE VARIABLES: SMOKING_STATUS: NO, I'VE NEVER SMOKED

## 2025-02-19 ENCOUNTER — HOSPITAL ENCOUNTER (OUTPATIENT)
Age: 30
Discharge: HOME | End: 2025-02-19
Payer: COMMERCIAL

## 2025-02-19 DIAGNOSIS — D63.1 ANEMIA IN STAGE 4 CHRONIC KIDNEY DISEASE (HCC): ICD-10-CM

## 2025-02-19 DIAGNOSIS — R80.9 PROTEINURIA, UNSPECIFIED TYPE: ICD-10-CM

## 2025-02-19 DIAGNOSIS — N18.4 ANEMIA IN STAGE 4 CHRONIC KIDNEY DISEASE (HCC): ICD-10-CM

## 2025-02-19 DIAGNOSIS — I12.9 BENIGN HYPERTENSION WITH CKD (CHRONIC KIDNEY DISEASE) STAGE IV (HCC): ICD-10-CM

## 2025-02-19 DIAGNOSIS — N18.4 BENIGN HYPERTENSION WITH CKD (CHRONIC KIDNEY DISEASE) STAGE IV (HCC): ICD-10-CM

## 2025-02-19 DIAGNOSIS — N18.4 CKD (CHRONIC KIDNEY DISEASE), STAGE IV (HCC): ICD-10-CM

## 2025-02-19 LAB
ANION GAP SERPL CALCULATED.3IONS-SCNC: 11 MMOL/L (ref 9–16)
BACTERIA URNS QL MICRO: ABNORMAL
BASOPHILS # BLD: 0.1 K/UL (ref 0–0.2)
BASOPHILS NFR BLD: 1 % (ref 0–2)
BILIRUB UR QL STRIP: NEGATIVE
BUN SERPL-MCNC: 55 MG/DL (ref 6–20)
CALCIUM SERPL-MCNC: 8.8 MG/DL (ref 8.6–10.4)
CASTS #/AREA URNS LPF: ABNORMAL /LPF
CHLORIDE SERPL-SCNC: 98 MMOL/L (ref 98–107)
CLARITY UR: CLEAR
CO2 SERPL-SCNC: 22 MMOL/L (ref 20–31)
COLOR UR: YELLOW
CREAT SERPL-MCNC: 4.5 MG/DL (ref 0.7–1.2)
CREAT UR-MCNC: 60.6 MG/DL (ref 39–259)
EOSINOPHIL # BLD: 0.5 K/UL (ref 0–0.4)
EOSINOPHILS RELATIVE PERCENT: 8 % (ref 0–4)
EPI CELLS #/AREA URNS HPF: ABNORMAL /HPF
ERYTHROCYTE [DISTWIDTH] IN BLOOD BY AUTOMATED COUNT: 12 % (ref 11.5–14.9)
GFR, ESTIMATED: 17 ML/MIN/1.73M2
GLUCOSE SERPL-MCNC: 82 MG/DL (ref 74–99)
GLUCOSE UR STRIP-MCNC: NEGATIVE MG/DL
HCT VFR BLD AUTO: 31.4 % (ref 41–53)
HGB BLD-MCNC: 10.7 G/DL (ref 13.5–17.5)
HGB UR QL STRIP.AUTO: ABNORMAL
KETONES UR STRIP-MCNC: NEGATIVE MG/DL
LEUKOCYTE ESTERASE UR QL STRIP: NEGATIVE
LYMPHOCYTES NFR BLD: 1.3 K/UL (ref 1–4.8)
LYMPHOCYTES RELATIVE PERCENT: 22 % (ref 24–44)
MAGNESIUM SERPL-MCNC: 2.1 MG/DL (ref 1.6–2.6)
MCH RBC QN AUTO: 28.9 PG (ref 26–34)
MCHC RBC AUTO-ENTMCNC: 34.2 G/DL (ref 31–37)
MCV RBC AUTO: 84.7 FL (ref 80–100)
MONOCYTES NFR BLD: 0.5 K/UL (ref 0.1–1.3)
MONOCYTES NFR BLD: 9 % (ref 1–7)
NEUTROPHILS NFR BLD: 60 % (ref 36–66)
NEUTS SEG NFR BLD: 3.5 K/UL (ref 1.3–9.1)
NITRITE UR QL STRIP: NEGATIVE
PH UR STRIP: 5.5 [PH] (ref 5–8)
PHOSPHATE SERPL-MCNC: 6.5 MG/DL (ref 2.5–4.5)
PLATELET # BLD AUTO: 235 K/UL (ref 150–450)
PMV BLD AUTO: 6.4 FL (ref 6–12)
POTASSIUM SERPL-SCNC: 5.3 MMOL/L (ref 3.7–5.3)
PROT UR STRIP-MCNC: ABNORMAL MG/DL
RBC # BLD AUTO: 3.71 M/UL (ref 4.5–5.9)
RBC #/AREA URNS HPF: ABNORMAL /HPF
SODIUM SERPL-SCNC: 131 MMOL/L (ref 136–145)
SP GR UR STRIP: 1.01 (ref 1–1.03)
TOTAL PROTEIN, URINE: 106 MG/DL
URINE TOTAL PROTEIN CREATININE RATIO: 1.75
UROBILINOGEN UR STRIP-ACNC: NORMAL EU/DL (ref 0–1)
WBC #/AREA URNS HPF: ABNORMAL /HPF
WBC OTHER # BLD: 5.9 K/UL (ref 3.5–11)

## 2025-02-19 PROCEDURE — 36415 COLL VENOUS BLD VENIPUNCTURE: CPT

## 2025-02-19 PROCEDURE — 83735 ASSAY OF MAGNESIUM: CPT

## 2025-02-19 PROCEDURE — 84156 ASSAY OF PROTEIN URINE: CPT

## 2025-02-19 PROCEDURE — 85025 COMPLETE CBC W/AUTO DIFF WBC: CPT

## 2025-02-19 PROCEDURE — 84100 ASSAY OF PHOSPHORUS: CPT

## 2025-02-19 PROCEDURE — 82570 ASSAY OF URINE CREATININE: CPT

## 2025-02-19 PROCEDURE — 80048 BASIC METABOLIC PNL TOTAL CA: CPT

## 2025-02-19 PROCEDURE — 81001 URINALYSIS AUTO W/SCOPE: CPT

## 2025-03-07 ENCOUNTER — HOSPITAL ENCOUNTER (OUTPATIENT)
Age: 30
Discharge: HOME | End: 2025-03-07
Payer: COMMERCIAL

## 2025-03-07 DIAGNOSIS — E87.5 HYPERKALEMIA: ICD-10-CM

## 2025-03-07 DIAGNOSIS — N18.4 BENIGN HYPERTENSION WITH CKD (CHRONIC KIDNEY DISEASE) STAGE IV (HCC): ICD-10-CM

## 2025-03-07 DIAGNOSIS — N18.4 ANEMIA IN STAGE 4 CHRONIC KIDNEY DISEASE (HCC): ICD-10-CM

## 2025-03-07 DIAGNOSIS — I12.9 BENIGN HYPERTENSION WITH CKD (CHRONIC KIDNEY DISEASE) STAGE IV (HCC): ICD-10-CM

## 2025-03-07 DIAGNOSIS — D63.1 ANEMIA IN STAGE 4 CHRONIC KIDNEY DISEASE (HCC): ICD-10-CM

## 2025-03-07 DIAGNOSIS — N18.4 CKD (CHRONIC KIDNEY DISEASE), STAGE IV (HCC): ICD-10-CM

## 2025-03-07 DIAGNOSIS — E87.1 HYPONATREMIA: ICD-10-CM

## 2025-03-07 LAB
ANION GAP SERPL CALCULATED.3IONS-SCNC: 9 MMOL/L (ref 9–16)
BUN SERPL-MCNC: 57 MG/DL (ref 6–20)
CALCIUM SERPL-MCNC: 8.9 MG/DL (ref 8.6–10.4)
CHLORIDE SERPL-SCNC: 103 MMOL/L (ref 98–107)
CO2 SERPL-SCNC: 23 MMOL/L (ref 20–31)
CREAT SERPL-MCNC: 4.9 MG/DL (ref 0.7–1.2)
GFR, ESTIMATED: 16 ML/MIN/1.73M2
GLUCOSE SERPL-MCNC: 84 MG/DL (ref 74–99)
POTASSIUM SERPL-SCNC: 5.8 MMOL/L (ref 3.7–5.3)
SODIUM SERPL-SCNC: 135 MMOL/L (ref 136–145)

## 2025-03-07 PROCEDURE — 80048 BASIC METABOLIC PNL TOTAL CA: CPT

## 2025-03-07 PROCEDURE — 36415 COLL VENOUS BLD VENIPUNCTURE: CPT

## 2025-03-10 ENCOUNTER — HOSPITAL ENCOUNTER (OUTPATIENT)
Age: 30
Discharge: HOME | End: 2025-03-10
Payer: COMMERCIAL

## 2025-03-10 DIAGNOSIS — I12.9 BENIGN HYPERTENSION WITH CKD (CHRONIC KIDNEY DISEASE) STAGE IV (HCC): ICD-10-CM

## 2025-03-10 DIAGNOSIS — N18.4 CKD (CHRONIC KIDNEY DISEASE), STAGE IV (HCC): ICD-10-CM

## 2025-03-10 DIAGNOSIS — N18.4 BENIGN HYPERTENSION WITH CKD (CHRONIC KIDNEY DISEASE) STAGE IV (HCC): ICD-10-CM

## 2025-03-10 DIAGNOSIS — E87.5 HYPERKALEMIA: ICD-10-CM

## 2025-03-10 LAB
ANION GAP SERPL CALCULATED.3IONS-SCNC: 14 MMOL/L (ref 9–16)
BUN SERPL-MCNC: 50 MG/DL (ref 6–20)
CALCIUM SERPL-MCNC: 8.4 MG/DL (ref 8.6–10.4)
CHLORIDE SERPL-SCNC: 101 MMOL/L (ref 98–107)
CO2 SERPL-SCNC: 19 MMOL/L (ref 20–31)
CREAT SERPL-MCNC: 4.9 MG/DL (ref 0.7–1.2)
GFR, ESTIMATED: 16 ML/MIN/1.73M2
GLUCOSE SERPL-MCNC: 82 MG/DL (ref 74–99)
POTASSIUM SERPL-SCNC: 4.7 MMOL/L (ref 3.7–5.3)
SODIUM SERPL-SCNC: 134 MMOL/L (ref 136–145)

## 2025-03-10 PROCEDURE — 36415 COLL VENOUS BLD VENIPUNCTURE: CPT

## 2025-03-10 PROCEDURE — 80048 BASIC METABOLIC PNL TOTAL CA: CPT

## 2025-04-02 ENCOUNTER — HOSPITAL ENCOUNTER (OUTPATIENT)
Age: 30
Discharge: HOME | End: 2025-04-02

## 2025-04-18 ENCOUNTER — HOSPITAL ENCOUNTER (INPATIENT)
Age: 30
LOS: 1 days | Discharge: HOME | DRG: 683 | End: 2025-04-19
Attending: EMERGENCY MEDICINE | Admitting: INTERNAL MEDICINE
Payer: COMMERCIAL

## 2025-04-18 ENCOUNTER — APPOINTMENT (OUTPATIENT)
Dept: ULTRASOUND IMAGING | Age: 30
DRG: 683 | End: 2025-04-18
Payer: COMMERCIAL

## 2025-04-18 DIAGNOSIS — N18.9 ACUTE KIDNEY INJURY SUPERIMPOSED ON CKD: Primary | ICD-10-CM

## 2025-04-18 DIAGNOSIS — N18.31 STAGE 3A CHRONIC KIDNEY DISEASE (HCC): ICD-10-CM

## 2025-04-18 DIAGNOSIS — N17.9 ACUTE KIDNEY INJURY SUPERIMPOSED ON CKD: Primary | ICD-10-CM

## 2025-04-18 DIAGNOSIS — N18.4 ACUTE WORSENING OF STAGE 4 CHRONIC KIDNEY DISEASE (HCC): ICD-10-CM

## 2025-04-18 DIAGNOSIS — N17.9 AKI (ACUTE KIDNEY INJURY): ICD-10-CM

## 2025-04-18 LAB
ALBUMIN SERPL-MCNC: 4.2 G/DL (ref 3.5–5.2)
ALBUMIN/GLOB SERPL: 1.4 {RATIO} (ref 1–2.5)
ALP SERPL-CCNC: 114 U/L (ref 40–129)
ALT SERPL-CCNC: 13 U/L (ref 10–50)
ANION GAP SERPL CALCULATED.3IONS-SCNC: 15 MMOL/L (ref 9–16)
AST SERPL-CCNC: 20 U/L (ref 10–50)
BACTERIA URNS QL MICRO: ABNORMAL
BASOPHILS # BLD: 0.03 K/UL (ref 0–0.2)
BASOPHILS NFR BLD: 1 % (ref 0–2)
BILIRUB SERPL-MCNC: 0.6 MG/DL (ref 0–1.2)
BILIRUB UR QL STRIP: NEGATIVE
BUN SERPL-MCNC: 55 MG/DL (ref 6–20)
CALCIUM SERPL-MCNC: 9 MG/DL (ref 8.6–10.4)
CASTS #/AREA URNS LPF: ABNORMAL /LPF (ref 0–8)
CHLORIDE SERPL-SCNC: 102 MMOL/L (ref 98–107)
CLARITY UR: CLEAR
CO2 SERPL-SCNC: 23 MMOL/L (ref 20–31)
COLOR UR: YELLOW
CREAT SERPL-MCNC: 6.5 MG/DL (ref 0.7–1.2)
CREAT UR-MCNC: 61.4 MG/DL (ref 39–259)
EOSINOPHIL # BLD: 0.46 K/UL (ref 0–0.44)
EOSINOPHILS RELATIVE PERCENT: 9 % (ref 1–4)
EPI CELLS #/AREA URNS HPF: ABNORMAL /HPF (ref 0–5)
ERYTHROCYTE [DISTWIDTH] IN BLOOD BY AUTOMATED COUNT: 10.8 % (ref 11.8–14.4)
GFR, ESTIMATED: 11 ML/MIN/1.73M2
GLUCOSE SERPL-MCNC: 110 MG/DL (ref 74–99)
GLUCOSE UR STRIP-MCNC: NEGATIVE MG/DL
HCT VFR BLD AUTO: 28.9 % (ref 40.7–50.3)
HGB BLD-MCNC: 10.3 G/DL (ref 13–17)
HGB UR QL STRIP.AUTO: ABNORMAL
IMM GRANULOCYTES # BLD AUTO: <0.03 K/UL (ref 0–0.3)
IMM GRANULOCYTES NFR BLD: 0 %
KETONES UR STRIP-MCNC: NEGATIVE MG/DL
LEUKOCYTE ESTERASE UR QL STRIP: NEGATIVE
LYMPHOCYTES NFR BLD: 1.4 K/UL (ref 1.1–3.7)
LYMPHOCYTES RELATIVE PERCENT: 27 % (ref 24–43)
MAGNESIUM SERPL-MCNC: 2.3 MG/DL (ref 1.6–2.6)
MCH RBC QN AUTO: 28.2 PG (ref 25.2–33.5)
MCHC RBC AUTO-ENTMCNC: 35.6 G/DL (ref 28.4–34.8)
MCV RBC AUTO: 79.2 FL (ref 82.6–102.9)
MONOCYTES NFR BLD: 0.46 K/UL (ref 0.1–1.2)
MONOCYTES NFR BLD: 9 % (ref 3–12)
NEUTROPHILS NFR BLD: 54 % (ref 36–65)
NEUTS SEG NFR BLD: 2.84 K/UL (ref 1.5–8.1)
NITRITE UR QL STRIP: NEGATIVE
NRBC BLD-RTO: 0 PER 100 WBC
PH UR STRIP: 7 [PH] (ref 5–8)
PHOSPHATE SERPL-MCNC: 4.9 MG/DL (ref 2.5–4.5)
PLATELET # BLD AUTO: 191 K/UL (ref 138–453)
PMV BLD AUTO: 8.9 FL (ref 8.1–13.5)
POTASSIUM SERPL-SCNC: 3.5 MMOL/L (ref 3.7–5.3)
PROT SERPL-MCNC: 7.1 G/DL (ref 6.6–8.7)
PROT UR STRIP-MCNC: ABNORMAL MG/DL
RBC # BLD AUTO: 3.65 M/UL (ref 4.21–5.77)
RBC # BLD: ABNORMAL 10*6/UL
RBC #/AREA URNS HPF: ABNORMAL /HPF (ref 0–4)
SODIUM SERPL-SCNC: 140 MMOL/L (ref 136–145)
SP GR UR STRIP: 1.01 (ref 1–1.03)
TOTAL PROTEIN, URINE: 239 MG/DL
URINE TOTAL PROTEIN CREATININE RATIO: 3.89 (ref 0–0.2)
UROBILINOGEN UR STRIP-ACNC: NORMAL EU/DL (ref 0–1)
WBC #/AREA URNS HPF: ABNORMAL /HPF (ref 0–5)
WBC OTHER # BLD: 5.2 K/UL (ref 3.5–11.3)

## 2025-04-18 PROCEDURE — 80053 COMPREHEN METABOLIC PANEL: CPT

## 2025-04-18 PROCEDURE — 84100 ASSAY OF PHOSPHORUS: CPT

## 2025-04-18 PROCEDURE — 84156 ASSAY OF PROTEIN URINE: CPT

## 2025-04-18 PROCEDURE — 82570 ASSAY OF URINE CREATININE: CPT

## 2025-04-18 PROCEDURE — 76770 US EXAM ABDO BACK WALL COMP: CPT

## 2025-04-18 PROCEDURE — 1200000000 HC SEMI PRIVATE

## 2025-04-18 PROCEDURE — 81001 URINALYSIS AUTO W/SCOPE: CPT

## 2025-04-18 PROCEDURE — 99285 EMERGENCY DEPT VISIT HI MDM: CPT

## 2025-04-18 PROCEDURE — 85025 COMPLETE CBC W/AUTO DIFF WBC: CPT

## 2025-04-18 PROCEDURE — 6370000000 HC RX 637 (ALT 250 FOR IP)

## 2025-04-18 PROCEDURE — 83735 ASSAY OF MAGNESIUM: CPT

## 2025-04-18 RX ORDER — AMLODIPINE BESYLATE 10 MG/1
10 TABLET ORAL DAILY
Status: DISCONTINUED | OUTPATIENT
Start: 2025-04-19 | End: 2025-04-19 | Stop reason: HOSPADM

## 2025-04-18 RX ORDER — DOXAZOSIN 2 MG/1
4 TABLET ORAL 2 TIMES DAILY
Status: DISCONTINUED | OUTPATIENT
Start: 2025-04-18 | End: 2025-04-19 | Stop reason: HOSPADM

## 2025-04-18 RX ORDER — SODIUM CHLORIDE 9 MG/ML
INJECTION, SOLUTION INTRAVENOUS PRN
Status: DISCONTINUED | OUTPATIENT
Start: 2025-04-18 | End: 2025-04-19 | Stop reason: HOSPADM

## 2025-04-18 RX ORDER — POLYETHYLENE GLYCOL 3350 17 G/17G
17 POWDER, FOR SOLUTION ORAL DAILY PRN
Status: DISCONTINUED | OUTPATIENT
Start: 2025-04-18 | End: 2025-04-19 | Stop reason: HOSPADM

## 2025-04-18 RX ORDER — HEPARIN SODIUM 5000 [USP'U]/ML
5000 INJECTION, SOLUTION INTRAVENOUS; SUBCUTANEOUS EVERY 8 HOURS SCHEDULED
Status: DISCONTINUED | OUTPATIENT
Start: 2025-04-18 | End: 2025-04-19 | Stop reason: HOSPADM

## 2025-04-18 RX ORDER — SODIUM CHLORIDE 0.9 % (FLUSH) 0.9 %
5-40 SYRINGE (ML) INJECTION PRN
Status: DISCONTINUED | OUTPATIENT
Start: 2025-04-18 | End: 2025-04-19 | Stop reason: HOSPADM

## 2025-04-18 RX ORDER — CARVEDILOL 25 MG/1
25 TABLET ORAL 2 TIMES DAILY
Status: DISCONTINUED | OUTPATIENT
Start: 2025-04-18 | End: 2025-04-19 | Stop reason: HOSPADM

## 2025-04-18 RX ORDER — CLONIDINE HYDROCHLORIDE 0.1 MG/1
0.1 TABLET ORAL 2 TIMES DAILY PRN
Status: DISCONTINUED | OUTPATIENT
Start: 2025-04-18 | End: 2025-04-19 | Stop reason: HOSPADM

## 2025-04-18 RX ORDER — POTASSIUM CHLORIDE 7.45 MG/ML
10 INJECTION INTRAVENOUS PRN
Status: DISCONTINUED | OUTPATIENT
Start: 2025-04-18 | End: 2025-04-18

## 2025-04-18 RX ORDER — ATORVASTATIN CALCIUM 20 MG/1
20 TABLET, FILM COATED ORAL DAILY
Status: DISCONTINUED | OUTPATIENT
Start: 2025-04-18 | End: 2025-04-19 | Stop reason: HOSPADM

## 2025-04-18 RX ORDER — MONTELUKAST SODIUM 10 MG/1
10 TABLET ORAL DAILY
Status: DISCONTINUED | OUTPATIENT
Start: 2025-04-19 | End: 2025-04-19 | Stop reason: HOSPADM

## 2025-04-18 RX ORDER — POTASSIUM CHLORIDE 1500 MG/1
40 TABLET, EXTENDED RELEASE ORAL PRN
Status: DISCONTINUED | OUTPATIENT
Start: 2025-04-18 | End: 2025-04-18

## 2025-04-18 RX ORDER — MAGNESIUM SULFATE IN WATER 40 MG/ML
2000 INJECTION, SOLUTION INTRAVENOUS PRN
Status: DISCONTINUED | OUTPATIENT
Start: 2025-04-18 | End: 2025-04-18

## 2025-04-18 RX ORDER — SEVELAMER CARBONATE 800 MG/1
800 TABLET, FILM COATED ORAL
Status: DISCONTINUED | OUTPATIENT
Start: 2025-04-19 | End: 2025-04-19 | Stop reason: HOSPADM

## 2025-04-18 RX ORDER — ACETAMINOPHEN 325 MG/1
650 TABLET ORAL EVERY 6 HOURS PRN
Status: DISCONTINUED | OUTPATIENT
Start: 2025-04-18 | End: 2025-04-19 | Stop reason: HOSPADM

## 2025-04-18 RX ORDER — ONDANSETRON 2 MG/ML
4 INJECTION INTRAMUSCULAR; INTRAVENOUS EVERY 6 HOURS PRN
Status: DISCONTINUED | OUTPATIENT
Start: 2025-04-18 | End: 2025-04-19 | Stop reason: HOSPADM

## 2025-04-18 RX ORDER — SODIUM CHLORIDE 0.9 % (FLUSH) 0.9 %
5-40 SYRINGE (ML) INJECTION EVERY 12 HOURS SCHEDULED
Status: DISCONTINUED | OUTPATIENT
Start: 2025-04-18 | End: 2025-04-19 | Stop reason: HOSPADM

## 2025-04-18 RX ORDER — ACETAMINOPHEN 650 MG/1
650 SUPPOSITORY RECTAL EVERY 6 HOURS PRN
Status: DISCONTINUED | OUTPATIENT
Start: 2025-04-18 | End: 2025-04-19 | Stop reason: HOSPADM

## 2025-04-18 RX ORDER — ONDANSETRON 4 MG/1
4 TABLET, ORALLY DISINTEGRATING ORAL EVERY 8 HOURS PRN
Status: DISCONTINUED | OUTPATIENT
Start: 2025-04-18 | End: 2025-04-19 | Stop reason: HOSPADM

## 2025-04-18 RX ADMIN — DOXAZOSIN 4 MG: 2 TABLET ORAL at 21:06

## 2025-04-18 RX ADMIN — ACETAMINOPHEN 650 MG: 325 TABLET ORAL at 22:14

## 2025-04-18 RX ADMIN — Medication 6 MG: at 22:14

## 2025-04-18 RX ADMIN — CARVEDILOL 25 MG: 25 TABLET, FILM COATED ORAL at 20:45

## 2025-04-18 RX ADMIN — AMITRIPTYLINE HYDROCHLORIDE 50 MG: 50 TABLET, FILM COATED ORAL at 21:06

## 2025-04-18 RX ADMIN — ATORVASTATIN CALCIUM 20 MG: 20 TABLET, FILM COATED ORAL at 20:45

## 2025-04-18 ASSESSMENT — ENCOUNTER SYMPTOMS
ABDOMINAL PAIN: 0
STRIDOR: 0
DIARRHEA: 0
WHEEZING: 0
APNEA: 0
BLOOD IN STOOL: 0
CHOKING: 0
COLOR CHANGE: 0
BACK PAIN: 0
ABDOMINAL DISTENTION: 0
NAUSEA: 0
SHORTNESS OF BREATH: 0
COUGH: 0
VOMITING: 0

## 2025-04-18 ASSESSMENT — PAIN - FUNCTIONAL ASSESSMENT: PAIN_FUNCTIONAL_ASSESSMENT: 0-10

## 2025-04-18 ASSESSMENT — PAIN SCALES - GENERAL: PAINLEVEL_OUTOF10: 0

## 2025-04-18 NOTE — ED NOTES
Pt given water oka'd by Dr. Ferguson. Pt resting in bed with personal items and call light within reach. No acute distress noted. Resp even and non labored. No complaints at this time. Await nephrology consult for further plan. Will continue with plan of care.

## 2025-04-18 NOTE — ED NOTES
Pt. Arrives to ED via walk-in for c/o evaluation for dialysis.  Pt. Reports that he was seen at [facility] for renal failure evaluation second opinion.  Pt. Denies any symptoms, states that he wa found to have a high potassium and prescribed lokalema.  Pt. States he receive a phone call today advising him to come into the ED for evaluation.

## 2025-04-18 NOTE — ED PROVIDER NOTES
[facility] EMERGENCY DEPARTMENT  Emergency Department Encounter  Emergency Medicine Resident     Pt Name:Ubaldo Sotomayor  MRN: [id number]  Birthdate 1995  Date of evaluation: 4/18/25  PCP:  Demetria Parisi MD  Note Started: 3:25 PM EDT      CHIEF COMPLAINT       Chief Complaint   Patient presents with    Abnormal Lab              HISTORY OF PRESENT ILLNESS  (Location/Symptom, Timing/Onset, Context/Setting, Quality, Duration, Modifying Factors, Severity.)      Ubaldo Sotomayor is a 29 y.o. male with PMH rapidly progressive CKD who presents emergency department by recommendation of his nephrologist for labs.  Patient tells me that he has had renal biopsies in the past without definitive etiology of his progressive CKD.  He is following with [facility] for potential renal transplant.  Patient denies any symptoms today and states that he is \"just here for labs.\"  Specifically denies fevers, chest pain, shortness of breath, abdominal pains, flank pains, difficulty with urination, gross hematuria.  He arrives hemodynamically stable and afebrile.  Answering all my questions appropriately.  He is hypertensive with a blood pressure 167/109 on arrival.  Started on Lokelma last week and an outpatient nephrology visit due to potassium of 6.0    PAST MEDICAL / SURGICAL / SOCIAL / FAMILY HISTORY      has a past medical history of Abnormal EKG, Anxiety, History of chicken pox, Hyperlipidemia, Hypertension, OCD (obsessive compulsive disorder), and Stage 3a chronic kidney disease (HCC).       has a past surgical history that includes Upper gastrointestinal endoscopy (N/A, 9/2/2020); Capsule endoscopy (N/A, 6/14/2021); IR BIOPSY KIDNEY PERCUTANEOUS (9/1/2022); and IR BIOPSY KIDNEY PERCUTANEOUS (9/2/2022).      Social History     Socioeconomic History    Marital status: Single     Spouse name: Not on file    Number of children: Not on file    Years of education: Not on file    Highest education level:  Filled as directed         PROCEDURES:  N/a    CONSULTS:  IP CONSULT TO NEPHROLOGY  IP CONSULT TO INTERNAL MEDICINE  IP CONSULT TO CASE MANAGEMENT    CRITICAL CARE:  There was significant risk of life threatening deterioration of patient's condition requiring my direct management. Critical care time 00 minutes, excluding any documented procedures.    FINAL IMPRESSION      1. Acute kidney injury superimposed on CKD          DISPOSITION / PLAN     DISPOSITION Admitted 04/18/2025 08:04:36 PM               PATIENT REFERRED TO:  No follow-up provider specified.    DISCHARGE MEDICATIONS:  New Prescriptions    No medications on file       Andrea Ferguson DO  Emergency Medicine Resident    (Please note that portions of this note were completed with a voice recognition program.  Efforts were made to edit the dictations but occasionally words are mis-transcribed.)

## 2025-04-18 NOTE — H&P
[facility]     Department of Internal Medicine - Staff Internal Medicine Teaching Service          ADMISSION NOTE/HISTORY AND PHYSICAL EXAMINATION   Date: 4/18/2025  Patient Name: Ubaldo Sotomayor  Date of admission: 4/18/2025  3:24 PM  YOB: 1995  PCP: Demetria Parisi MD  History Obtained From:  patient, electronic medical record    CHIEF COMPLAINT     Chief complaint: Sent by nephrologist for abnormal lab    HISTORY OF PRESENTING ILLNESS     The patient is a pleasant 29 y.o. male with a PMHx significant for     Hypertension  CKD stage IV    Who presented to emergency department by recommendation of his nephrologist for labs.  Patient is known case of CKD stage IV which is worsening progressively however patient still makes some urine and is not on dialysis.  Patient states that he has had renal biopsy done in the past as well as recently without any definitive etiology.  Patient biopsy is positive for dsDNA both the time.  Patient states that he is following with [facility] for potential renal transplant.    Patient denies fever, chills, rigors, chest pain, shortness of breath, abdominal pain, flank pain, difficulty with urination, hematuria.    Initial lab showed sodium 140, potassium 2.5, creatinine 6.5 (baseline creatinine 3 to 4), GFR 11, phosphorus 4.9, CBC unremarkable.    Patient is hemodynamically stable however his blood pressure on the high side 160/100.  Physical examination unremarkable.    Review of Systems   Constitutional:  Negative for activity change, chills and fever.   Respiratory:  Negative for apnea, cough, choking, shortness of breath, wheezing and stridor.    Cardiovascular:  Negative for chest pain, palpitations and leg swelling.   Gastrointestinal:  Negative for abdominal distention, abdominal pain, blood in stool, diarrhea, nausea and vomiting.   Genitourinary:  Negative for difficulty urinating, enuresis, frequency, hematuria and

## 2025-04-18 NOTE — ED PROVIDER NOTES
[facility]     Emergency Department     Faculty Attestation    I performed a history and physical examination of the patient and discussed management with the resident. I reviewed the resident’s note and agree with the documented findings including all diagnostic interpretations and plan of care. Any areas of disagreement are noted on the chart. I was personally present for the key portions of any procedures. I have documented in the chart those procedures where I was not present during the key portions. I have reviewed the emergency nurses triage note. I agree with the chief complaint, past medical history, past surgical history, allergies, medications, social and family history as documented unless otherwise noted below. Documentation of the HPI, Physical Exam and Medical Decision Making performed by stacey is based on my personal performance of the HPI, PE and MDM. For Physician Assistant/ Nurse Practitioner cases/documentation I have personally evaluated this patient and have completed at least one if not all key elements of the E/M (history, physical exam, and MDM). Additional findings are as noted.    Primary Care Physician: Demetria Parisi MD    Note Started: 7:31 PM EDT     VITAL SIGNS:   temperature is 98.2 °F (36.8 °C). His blood pressure is 167/109 (abnormal) and his pulse is 95. His respiration is 16 and oxygen saturation is 100%.      Medical Decision Making  Kidney dysfunction.  Known history of progressively worsening.  Suspected rheumatologic disorder being worked up.  Patient has no complaints currently but 1 week ago labs showed worsening kidney function with hyperkalemia and he is now currently on Lokelma.  Sent in by nephrologist.  Cardiac exam regular rate and rhythm no murmurs rubs gallops, pulmonary clear bilaterally    Amount and/or Complexity of Data Reviewed  External Data Reviewed: labs and notes.  Labs: ordered.

## 2025-04-19 VITALS
TEMPERATURE: 97.9 F | BODY MASS INDEX: 29.75 KG/M2 | SYSTOLIC BLOOD PRESSURE: 153 MMHG | DIASTOLIC BLOOD PRESSURE: 99 MMHG | OXYGEN SATURATION: 99 % | RESPIRATION RATE: 16 BRPM | WEIGHT: 212.52 LBS | HEART RATE: 79 BPM | HEIGHT: 71 IN

## 2025-04-19 PROBLEM — N18.9 ACUTE KIDNEY INJURY SUPERIMPOSED ON CKD: Status: ACTIVE | Noted: 2025-04-19

## 2025-04-19 PROBLEM — R76.8 ANA POSITIVE: Status: ACTIVE | Noted: 2025-04-19

## 2025-04-19 PROBLEM — N17.9 ACUTE KIDNEY INJURY SUPERIMPOSED ON CKD: Status: ACTIVE | Noted: 2025-04-19

## 2025-04-19 PROBLEM — E87.6 HYPOKALEMIA: Status: ACTIVE | Noted: 2025-04-19

## 2025-04-19 PROBLEM — N18.5 CKD (CHRONIC KIDNEY DISEASE) STAGE 5, GFR LESS THAN 15 ML/MIN (HCC): Status: ACTIVE | Noted: 2025-04-19

## 2025-04-19 LAB
ANION GAP SERPL CALCULATED.3IONS-SCNC: 12 MMOL/L (ref 9–16)
BASOPHILS # BLD: <0.03 K/UL (ref 0–0.2)
BASOPHILS NFR BLD: 1 % (ref 0–2)
BUN SERPL-MCNC: 52 MG/DL (ref 6–20)
C3 SERPL-MCNC: 103 MG/DL (ref 90–180)
C4 SERPL-MCNC: 31 MG/DL (ref 10–40)
CALCIUM SERPL-MCNC: 8.8 MG/DL (ref 8.6–10.4)
CHLORIDE SERPL-SCNC: 100 MMOL/L (ref 98–107)
CO2 SERPL-SCNC: 24 MMOL/L (ref 20–31)
CREAT SERPL-MCNC: 6.7 MG/DL (ref 0.7–1.2)
CREAT UR-MCNC: 35.2 MG/DL (ref 39–259)
EOSINOPHIL # BLD: 0.34 K/UL (ref 0–0.44)
EOSINOPHILS RELATIVE PERCENT: 8 % (ref 1–4)
ERYTHROCYTE [DISTWIDTH] IN BLOOD BY AUTOMATED COUNT: 10.8 % (ref 11.8–14.4)
GFR, ESTIMATED: 11 ML/MIN/1.73M2
GLUCOSE SERPL-MCNC: 86 MG/DL (ref 74–99)
HCT VFR BLD AUTO: 26.7 % (ref 40.7–50.3)
HGB BLD-MCNC: 9.5 G/DL (ref 13–17)
IMM GRANULOCYTES # BLD AUTO: <0.03 K/UL (ref 0–0.3)
IMM GRANULOCYTES NFR BLD: 0 %
LYMPHOCYTES NFR BLD: 1.08 K/UL (ref 1.1–3.7)
LYMPHOCYTES RELATIVE PERCENT: 26 % (ref 24–43)
MCH RBC QN AUTO: 28.3 PG (ref 25.2–33.5)
MCHC RBC AUTO-ENTMCNC: 35.6 G/DL (ref 28.4–34.8)
MCV RBC AUTO: 79.5 FL (ref 82.6–102.9)
MONOCYTES NFR BLD: 0.44 K/UL (ref 0.1–1.2)
MONOCYTES NFR BLD: 11 % (ref 3–12)
NEUTROPHILS NFR BLD: 54 % (ref 36–65)
NEUTS SEG NFR BLD: 2.3 K/UL (ref 1.5–8.1)
NRBC BLD-RTO: 0 PER 100 WBC
PLATELET # BLD AUTO: 160 K/UL (ref 138–453)
PMV BLD AUTO: 9.1 FL (ref 8.1–13.5)
POTASSIUM SERPL-SCNC: 3.8 MMOL/L (ref 3.7–5.3)
RBC # BLD AUTO: 3.36 M/UL (ref 4.21–5.77)
RBC # BLD: ABNORMAL 10*6/UL
SODIUM SERPL-SCNC: 136 MMOL/L (ref 136–145)
TOTAL PROTEIN, URINE: 104 MG/DL
WBC OTHER # BLD: 4.2 K/UL (ref 3.5–11.3)

## 2025-04-19 PROCEDURE — 85025 COMPLETE CBC W/AUTO DIFF WBC: CPT

## 2025-04-19 PROCEDURE — 86225 DNA ANTIBODY NATIVE: CPT

## 2025-04-19 PROCEDURE — 99223 1ST HOSP IP/OBS HIGH 75: CPT | Performed by: INTERNAL MEDICINE

## 2025-04-19 PROCEDURE — 6360000002 HC RX W HCPCS

## 2025-04-19 PROCEDURE — 36415 COLL VENOUS BLD VENIPUNCTURE: CPT

## 2025-04-19 PROCEDURE — 99222 1ST HOSP IP/OBS MODERATE 55: CPT | Performed by: INTERNAL MEDICINE

## 2025-04-19 PROCEDURE — 82570 ASSAY OF URINE CREATININE: CPT

## 2025-04-19 PROCEDURE — 80048 BASIC METABOLIC PNL TOTAL CA: CPT

## 2025-04-19 PROCEDURE — 84156 ASSAY OF PROTEIN URINE: CPT

## 2025-04-19 PROCEDURE — 6370000000 HC RX 637 (ALT 250 FOR IP)

## 2025-04-19 PROCEDURE — 6370000000 HC RX 637 (ALT 250 FOR IP): Performed by: INTERNAL MEDICINE

## 2025-04-19 PROCEDURE — 86160 COMPLEMENT ANTIGEN: CPT

## 2025-04-19 PROCEDURE — 2500000003 HC RX 250 WO HCPCS

## 2025-04-19 RX ORDER — CLONIDINE HYDROCHLORIDE 0.1 MG/1
0.1 TABLET ORAL 2 TIMES DAILY
Qty: 60 TABLET | Refills: 1 | Status: SHIPPED | OUTPATIENT
Start: 2025-04-19

## 2025-04-19 RX ORDER — CLONIDINE HYDROCHLORIDE 0.1 MG/1
0.1 TABLET ORAL 2 TIMES DAILY
Status: DISCONTINUED | OUTPATIENT
Start: 2025-04-19 | End: 2025-04-19 | Stop reason: HOSPADM

## 2025-04-19 RX ADMIN — MONTELUKAST 10 MG: 10 TABLET, FILM COATED ORAL at 08:56

## 2025-04-19 RX ADMIN — DOXAZOSIN 4 MG: 2 TABLET ORAL at 08:56

## 2025-04-19 RX ADMIN — SEVELAMER CARBONATE 800 MG: 800 TABLET, FILM COATED ORAL at 13:00

## 2025-04-19 RX ADMIN — SERTRALINE HYDROCHLORIDE 200 MG: 50 TABLET ORAL at 08:55

## 2025-04-19 RX ADMIN — HEPARIN SODIUM 5000 UNITS: 5000 INJECTION INTRAVENOUS; SUBCUTANEOUS at 13:51

## 2025-04-19 RX ADMIN — CARVEDILOL 25 MG: 25 TABLET, FILM COATED ORAL at 08:56

## 2025-04-19 RX ADMIN — SODIUM CHLORIDE, PRESERVATIVE FREE 10 ML: 5 INJECTION INTRAVENOUS at 09:01

## 2025-04-19 RX ADMIN — CLONIDINE HYDROCHLORIDE 0.1 MG: 0.1 TABLET ORAL at 16:05

## 2025-04-19 RX ADMIN — SEVELAMER CARBONATE 800 MG: 800 TABLET, FILM COATED ORAL at 08:55

## 2025-04-19 RX ADMIN — ATORVASTATIN CALCIUM 20 MG: 20 TABLET, FILM COATED ORAL at 08:56

## 2025-04-19 RX ADMIN — HEPARIN SODIUM 5000 UNITS: 5000 INJECTION INTRAVENOUS; SUBCUTANEOUS at 05:46

## 2025-04-19 RX ADMIN — AMLODIPINE BESYLATE 10 MG: 10 TABLET ORAL at 08:56

## 2025-04-19 RX ADMIN — ACETAMINOPHEN 650 MG: 325 TABLET ORAL at 10:23

## 2025-04-19 ASSESSMENT — PAIN SCALES - GENERAL
PAINLEVEL_OUTOF10: 1
PAINLEVEL_OUTOF10: 4

## 2025-04-19 ASSESSMENT — PAIN DESCRIPTION - LOCATION: LOCATION: ABDOMEN

## 2025-04-19 ASSESSMENT — PAIN DESCRIPTION - ONSET: ONSET: GRADUAL

## 2025-04-19 ASSESSMENT — PAIN DESCRIPTION - PAIN TYPE: TYPE: ACUTE PAIN

## 2025-04-19 ASSESSMENT — PAIN DESCRIPTION - DESCRIPTORS: DESCRIPTORS: ACHING

## 2025-04-19 ASSESSMENT — PAIN DESCRIPTION - ORIENTATION: ORIENTATION: MID

## 2025-04-19 ASSESSMENT — PAIN - FUNCTIONAL ASSESSMENT: PAIN_FUNCTIONAL_ASSESSMENT: ACTIVITIES ARE NOT PREVENTED

## 2025-04-19 ASSESSMENT — PAIN DESCRIPTION - FREQUENCY: FREQUENCY: INTERMITTENT

## 2025-04-19 NOTE — ED NOTES
Pt. Resting in bed, eyes open, NAD. Pt updated on admission status. Pt given boxed lunch and water. VSS. Resp even and non labored. Will continue with plan of care.

## 2025-04-19 NOTE — PROGRESS NOTES
PHARMACY NOTE:    The electrolyte replacement protocol for potassium/magnesium has been discontinued per P&T guidelines because the patient has reduced renal function (CrCl < 30 mL/min).      The patient's most recent potassium & magnesium levels are:  Recent Labs     04/18/25  1601   K 3.5*   MG 2.3     CrCl cannot be calculated (Unknown ideal weight.).    For patients with decreased renal function (below 30ml/min) needing potassium/magnesium supplementation, please order individual bolus doses with appropriate monitoring.      Please contact the inpatient pharmacy with any concerns.  Thank you.

## 2025-04-19 NOTE — CONSULTS
Nephrology Consult Note    Reason for Consult:  CKD stage 4  Requesting Physician:  Juan J De Santiago MD     History Obtained From:  patient, electronic medical record    Chief Complaint:     Chief Complaint   Patient presents with    Abnormal Lab              History of Present Illness:               This is a 29 y.o. male who presented to the hospital for evaluation of  abnormal labs.  Is undergoing workup for renal transplant at [facility] and has a comprehensive group appointment on 5/15/2025.  Was getting routine lab work done by his nephrologist here which did show an elevation in his creatinine at 6.5.  Repeat creatinine 6.7.  This is improved in comparison to recent labs that he had at [facility] on/9/25 with creatinine of 7.07.  Since his appointment at [facility] patient has been taking Lokelma 10 g twice daily and his potassium level was 3.5 on admission.  He was instructed to present to [facility] by his outpatient nephrologist for further lab evaluation.    This is a 28 y.o. male with past medical history of essential hypertension, CKD stage IIIb with baseline serum creatinine around 2.7 mg/dL, follows up with renal services of [place].  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    No intake or output recorded    No IV fluids    No Bennett    UPC 3.89    U/A reviewed negative for glucose bilirubin ketones nitrites leukocyte esterase.  Specific gravity 1.007 with +3 protein and a pH of 7.0 with hemoglobin present    Renal ultrasound completed on

## 2025-04-19 NOTE — PLAN OF CARE
Problem: Pain  Goal: Verbalizes/displays adequate comfort level or baseline comfort level  4/19/2025 1624 by Lauryn Garcia, RN  Outcome: Adequate for Discharge  4/19/2025 0313 by Leslie Oconnell, RN  Outcome: Progressing

## 2025-04-19 NOTE — DISCHARGE INSTRUCTIONS
-You were brought in for the chief concern of abnormal labwork ordered by your Nephrologist. You were found to have hypokalemia due to Lokelma use.     -Please avoid taking Lokelma until you see your Nephrologist.  -Please continue taking your medications as prescribed.    -Please follow-up with your PCP within one week for post-hospitalization.   -Please follow-up with your Nephrologist Dr. Cardoza for abnormal lab work and CKD.  -Please follow-up with Rheumatology for history of positive anti-dsDNA antibodies.    -Please call 911 or come to the ER if you experience worsening of the symptoms.

## 2025-04-19 NOTE — CARE COORDINATION
Case Management Assessment  Initial Evaluation    Date/Time of Evaluation: 4/19/2025   Assessment Completed by: Carlie Machado    If patient is discharged prior to next notation, then this note serves as note for discharge by case management.    Patient Name: Ubaldo Sotomayor                   YOB: 1995  Diagnosis: Acute worsening of stage 4 chronic kidney disease [N18.4]  Acute kidney injury superimposed on CKD [N17.9, N18.9]                   Date / Time: 4/18/2025  3:24 PM    Patient Admission Status: Inpatient   Readmission Risk (Low < 19, Mod (19-27), High > 27): Readmission Risk Score: 14.6    Current PCP: Demetria Parisi MD  PCP verified by CM? (P) Yes (Dr Demetria Parisi)    Chart Reviewed: Yes      History Provided by: (P) Patient  Patient Orientation: (P) Alert and Oriented, Person, Place, Situation, Self    Patient Cognition: (P) Alert    Hospitalization in the last 30 days (Readmission):  No    If yes, Readmission Assessment in  Navigator will be completed.    Advance Directives:      Code Status: Full Code   Patient's Primary Decision Maker is: (P) Legal Next of Kin      Discharge Planning:    Patient lives with: (P) Parent Type of Home: (P) House  Primary Care Giver: (P) Self  Patient Support Systems include: (P) Parent   Current Financial resources: (P) Other (Comment) (has [place] [employer])  Current community resources:    Current services prior to admission: (P) None            Current DME:              Type of Home Care services:  (P) None    ADLS  Prior functional level: (P) Independent in ADLs/IADLs  Current functional level: (P) Independent in ADLs/IADLs    PT AM-PAC:   /24  OT AM-PAC:   /24    Family can provide assistance at DC: (P) Yes  Would you like Case Management to discuss the discharge plan with any other family members/significant others, and if so, who? (P) No  Plans to Return to Present Housing: (P) Yes  Other Identified Issues/Barriers to RETURNING to current

## 2025-04-19 NOTE — ED NOTES
ED to inpatient nurses report      Chief Complaint:  Chief Complaint   Patient presents with    Abnormal Lab            Present to ED from: Pt. Arrives to ED via walk-in for c/o evaluation for dialysis. Pt. Reports that he was seen at [facility] for renal failure evaluation second opinion. Pt. Denies any symptoms, states that he wa found to have a high potassium and prescribed lokalema. Pt. States he receive a phone call today advising him to come into the ED for evaluation.     MOA:     LOC: alert and orientated to name, place, date  Mobility: Independent  Oxygen Baseline: ra    Current needs required: 0   Pending ED orders: none  Present condition: stable    Why did the patient come to the ED? Lab work for ckd  What is the plan? Admission, nephro consult. Pending US result  Any procedures or intervention occur? Renal US.  Any safety concerns?? none    Mental Status:       Psych Assessment:   Psychosocial  Psychosocial (WDL): Within Defined Limits  Vital signs   Vitals:    04/18/25 1500 04/18/25 2030 04/18/25 2031   BP: (!) 167/109 (!) 163/107 (!) 163/107   Pulse: 95 78    Resp: 16 16    Temp: 98.2 °F (36.8 °C)     SpO2: 100% 100% 100%        Vitals:  Patient Vitals for the past 24 hrs:   BP Temp Pulse Resp SpO2   04/18/25 2031 (!) 163/107 -- -- -- 100 %   04/18/25 2030 (!) 163/107 -- 78 16 100 %   04/18/25 1500 (!) 167/109 98.2 °F (36.8 °C) 95 16 100 %      Visit Vitals  BP (!) 163/107   Pulse 78   Temp 98.2 °F (36.8 °C)   Resp 16   SpO2 100%        LDAs:   Peripheral IV 04/18/25 Right Wrist (Active)   Site Assessment Clean, dry & intact 04/18/25 1551   Line Status Blood return noted;Specimen collected;No blood return;Flushed 04/18/25 1551   Phlebitis Assessment No symptoms 04/18/25 1551   Infiltration Assessment 0 04/18/25 1551       Ambulatory Status:  Presents to emergency department  because of falls (Syncope, seizure, or loss of consciousness): No, Age > 70: No, Altered Mental Status, Intoxication with  1 tablet by mouth daily    ONDANSETRON (ZOFRAN) 4 MG TABLET    Take 1 tablet by mouth 3 times daily as needed for Nausea or Vomiting    SERTRALINE (ZOLOFT) 100 MG TABLET    Take 2 tablets by mouth    SEVELAMER (RENVELA) 800 MG TABLET    Take 1 tablet by mouth 3 times daily (with meals)     Orders Placed This Encounter   Medications    sodium chloride flush 0.9 % injection 5-40 mL    sodium chloride flush 0.9 % injection 5-40 mL    0.9 % sodium chloride infusion    DISCONTD: potassium chloride (KLOR-CON M) extended release tablet 40 mEq    DISCONTD: potassium bicarb-citric acid (EFFER-K) effervescent tablet 40 mEq    DISCONTD: potassium chloride 10 mEq/100 mL IVPB (Peripheral Line)    DISCONTD: magnesium sulfate 2000 mg in 50 mL IVPB premix    OR Linked Order Group     ondansetron (ZOFRAN-ODT) disintegrating tablet 4 mg     ondansetron (ZOFRAN) injection 4 mg    polyethylene glycol (GLYCOLAX) packet 17 g    OR Linked Order Group     acetaminophen (TYLENOL) tablet 650 mg     acetaminophen (TYLENOL) suppository 650 mg    melatonin tablet 6 mg    heparin (porcine) injection 5,000 Units    amLODIPine (NORVASC) tablet 10 mg    carvedilol (COREG) tablet 25 mg    cloNIDine (CATAPRES) tablet 0.1 mg    doxazosin (CARDURA) tablet 4 mg    montelukast (SINGULAIR) tablet 10 mg    sevelamer (RENVELA) tablet 800 mg    amitriptyline (ELAVIL) tablet 50 mg    atorvastatin (LIPITOR) tablet 20 mg    sertraline (ZOLOFT) tablet 200 mg       SURGICAL HISTORY       Past Surgical History:   Procedure Laterality Date    CAPSULE ENDOSCOPY N/A 6/14/2021    ESOPHAGEAL CAPSULE ENDOSCOPY / BRAVO performed by Christopher Damon MD at [facility] ENDO    IR BIOPSY KIDNEY PERCUTANEOUS  9/1/2022    IR BIOPSY KIDNEY PERCUTANEOUS 9/1/2022 [facility] SPECIAL PROCEDURES    IR BIOPSY KIDNEY PERCUTANEOUS  9/2/2022    IR BIOPSY KIDNEY PERCUTANEOUS    UPPER GASTROINTESTINAL ENDOSCOPY N/A 9/2/2020    EGD BIOPSY performed by Christopher Damon MD at [facility] ENDO       PAST MEDICAL

## 2025-04-19 NOTE — PROGRESS NOTES
[facility]  Internal Medicine Teaching Residency Program  Inpatient Daily Progress Note  ______________________________________________________________________________    Patient: Ubaldo Sotomayor  YOB: 1995   MRN:[id number]    Acct: [id number]     Room: 22/22  Admit date: 4/18/2025  Today's date: 04/19/25  Number of days in the hospital: 1    SUBJECTIVE   Admitting Diagnosis: Acute worsening of stage 4 chronic kidney disease  CC: Abnormal labs, sent by nephrology  No acute events overnight  Pt examined at bedside. Chart & results reviewed.   Patient is eating ok, sleeping ok, normal bowel/ bladder movements.  Patient is able to ambulate.  Patient is hemodynamically stable blood pressure 147/103,   afebrile in room air saturation over 95     Morning labs-pending      Ultrasound kidney-mildly increased renal cortical echogenicity and mildly cortical thinning.  No hydronephrosis    ROS:  Constitutional:  negative for chills, fevers, sweats  Respiratory:  negative for cough, dyspnea on exertion, hemoptysis, shortness of breath, wheezing  Cardiovascular:  negative for chest pain, chest pressure/discomfort, lower extremity edema, palpitations  Gastrointestinal:  negative for abdominal pain, constipation, diarrhea, nausea, vomiting  Neurological:  negative for dizziness, headache    BRIEF HISTORY   The patient is a pleasant 29 y.o. male with a PMHx significant for      Hypertension  CKD stage IV     Who presented to emergency department by recommendation of his nephrologist for labs.  Patient is known case of CKD stage IV which is worsening progressively however patient still makes some urine and is not on dialysis.  Patient states that he has had renal biopsy done in the past as well as recently without any definitive etiology.  Patient biopsy is positive for dsDNA both the time.  Patient states that he is [facility]  - Will repeat ultrasound kidney  -Follow-up on protein/creatinine ratio  -Avoid nephrotoxic drug  -Strict I/O charting  -Advised better control blood pressure  -Nephrology consulted, might need dialysis  -Nephrology on board will follow recommendation     Hypertension  -Known case  of hypertension on, amlodipine, Coreg, clonidine and Cardura at home  - Will resume home dose of antihypertensive  - Monitor blood pressure and other vitals     Generalized anxiety disorder  - Will resume home dose of amitriptyline sertraline       Diet: ADULT DIET; Regular; No Added Salt (3-4 gm); Low Potassium (Less than 3000 mg/day); Low Phosphorus (Less than 1000 mg)   DVT ppx : Heparin SQ  GI ppx:     PT/OT: Consulted  Discharge Planning / SW:       PALLAVI MOORE MD  Internal Medicine Resident, PGY-1  [place], [place]  4/19/2025,12:30 AM

## 2025-04-19 NOTE — ED NOTES
Pt. Resting comfortably in bed with even and non labored resp. Pt denies any needs at this time. Resp even and non labored. Will continue with plan of care.

## 2025-04-19 NOTE — CONSULTS
Rheumatology Inpatient Consult Note          Patient: Ubaldo Sotomayor / 1995 (29 y.o.)  MRN: [id number]    Reason for Consult:  Acute kidney injury and positive double-stranded DNA antibodies  Requesting Physician:   attending physician  Primary Care Physician: Demetria Parisi MD    CHIEF COMPLAINT:    Chief Complaint   Patient presents with    Abnormal Lab              History Obtained From:  patient and family    HISTORY OF PRESENT ILLNESS:                The patient is a 29 y.o. male with significant past medical history of  chronic kidney disease and hypertension who presents with  worsening of creatinine levels.  Patient states that he has a longstanding history of kidney dysfunction.  He has been stable though.  He recently underwent routine follow-up blood testing and he was found to have elevated creatinine from the baseline.  He was advised by his nephrologist to come to the hospital.  Patient states that he has undergone kidney biopsy which was unremarkable for any conclusive diagnosis.  He was told that the sample was not enough.  They are not recommending to repeat his biopsy again due to the complications associated with kidney biopsy.  He is considered high risk.  Patient states that he has been following up with [facility].  He is on renal transplant list at [facility]. Patient denies any other symptoms associated with lupus including skin rash, mucosal ulcers, hair loss, history of pleurisy, history of PE or DVT, joint pain or swelling or gross hematuria he does admit to microscopic hematuria.  He denies any family history of lupus but his mother is known to have rheumatoid arthritis.  I know his mother well  as she is my patient at our office.    Past Medical History:    Past Medical History:   Diagnosis Date    Abnormal EKG     Anxiety     History of chicken pox 2002    Hyperlipidemia     Hypertension     OCD (obsessive compulsive disorder)     Stage 3a chronic kidney disease

## 2025-04-21 LAB — DSDNA IGG SER QL IA: 52 IU/ML

## 2025-04-25 ENCOUNTER — HOSPITAL ENCOUNTER (OUTPATIENT)
Age: 30
Discharge: HOME | End: 2025-04-25
Payer: COMMERCIAL

## 2025-04-25 DIAGNOSIS — N17.9 ACUTE KIDNEY INJURY SUPERIMPOSED ON CKD: ICD-10-CM

## 2025-04-25 DIAGNOSIS — N18.31 STAGE 3A CHRONIC KIDNEY DISEASE (HCC): ICD-10-CM

## 2025-04-25 DIAGNOSIS — N18.9 ACUTE KIDNEY INJURY SUPERIMPOSED ON CKD: ICD-10-CM

## 2025-04-25 LAB
ANION GAP SERPL CALCULATED.3IONS-SCNC: 8 MMOL/L (ref 9–16)
BUN SERPL-MCNC: 65 MG/DL (ref 6–20)
CALCIUM SERPL-MCNC: 8.7 MG/DL (ref 8.6–10.4)
CHLORIDE SERPL-SCNC: 100 MMOL/L (ref 98–107)
CO2 SERPL-SCNC: 27 MMOL/L (ref 20–31)
CREAT SERPL-MCNC: 6.7 MG/DL (ref 0.7–1.2)
GFR, ESTIMATED: 11 ML/MIN/1.73M2
GLUCOSE SERPL-MCNC: 90 MG/DL (ref 74–99)
POTASSIUM SERPL-SCNC: 4.4 MMOL/L (ref 3.7–5.3)
SODIUM SERPL-SCNC: 135 MMOL/L (ref 136–145)

## 2025-04-25 PROCEDURE — 36415 COLL VENOUS BLD VENIPUNCTURE: CPT

## 2025-04-25 PROCEDURE — 80048 BASIC METABOLIC PNL TOTAL CA: CPT

## 2025-04-25 NOTE — DISCHARGE SUMMARY
[facility]     Department of Internal Medicine - Staff Internal Medicine Teaching Service    INPATIENT DISCHARGE SUMMARY      Patient Identification:  Ubaldo Sotomayor is a 29 y.o. male.  :  1995  MRN: [id number]     Acct: [id number]   PCP: Demetria Parisi MD  Admit Date:  2025  Discharge date and time: 2025  4:56 PM   Attending Provider: No att. providers found                                     ACTIVE DISCHARGE DIAGNOSES     Hospital Problem Lists:  Principal Problem:    Acute worsening of stage 4 chronic kidney disease (HCC)  Active Problems:    Generalized anxiety disorder    Hypertension, essential    Acute kidney injury superimposed on CKD    CKD (chronic kidney disease) stage 5, GFR less than 15 ml/min (HCC)    Hypokalemia    LO positive  Resolved Problems:    * No resolved hospital problems. *      HOSPITAL STAY     Brief Inpatient course:   Ubaldo Sotomayor is a 29 y.o. male  with a PMHx significant for      Hypertension  CKD stage IV     Who presented to emergency department by recommendation of his nephrologist for labs.  Patient is known case of CKD stage IV which is worsening progressively however patient still makes some urine and is not on dialysis.  Patient states that he has had renal biopsy done in the past as well as recently without any definitive etiology.  Patient biopsy is positive for dsDNA both the time.  Patient states that he is following with [facility] for potential renal transplant.  Patient was admitted for hypokalemia due to Lokelma.  Patient was sent by his PCP for low potassium.  While in hospital there was concern that he might need a dialysis and as patient was evaluated by nephrologist they recommended no dialysis during this time.  Iron is renal function was stable at CKD stage V and also as patient was working towards potential initiating peritoneal dialysis and also getting transplant evaluation done at [facility]

## 2025-05-01 ENCOUNTER — HOSPITAL ENCOUNTER (OUTPATIENT)
Age: 30
Discharge: HOME | End: 2025-05-01
Payer: COMMERCIAL

## 2025-05-01 DIAGNOSIS — N18.9 ACUTE KIDNEY INJURY SUPERIMPOSED ON CKD: ICD-10-CM

## 2025-05-01 DIAGNOSIS — N17.9 ACUTE KIDNEY INJURY SUPERIMPOSED ON CKD: ICD-10-CM

## 2025-05-01 DIAGNOSIS — N18.31 STAGE 3A CHRONIC KIDNEY DISEASE (HCC): ICD-10-CM

## 2025-05-01 LAB
ANION GAP SERPL CALCULATED.3IONS-SCNC: 13 MMOL/L (ref 9–16)
BUN SERPL-MCNC: 80 MG/DL (ref 6–20)
CALCIUM SERPL-MCNC: 8.4 MG/DL (ref 8.6–10.4)
CHLORIDE SERPL-SCNC: 100 MMOL/L (ref 98–107)
CO2 SERPL-SCNC: 22 MMOL/L (ref 20–31)
CREAT SERPL-MCNC: 7.5 MG/DL (ref 0.7–1.2)
GFR, ESTIMATED: 9 ML/MIN/1.73M2
GLUCOSE SERPL-MCNC: 113 MG/DL (ref 74–99)
POTASSIUM SERPL-SCNC: 4.4 MMOL/L (ref 3.7–5.3)
SODIUM SERPL-SCNC: 135 MMOL/L (ref 136–145)

## 2025-05-01 PROCEDURE — 36415 COLL VENOUS BLD VENIPUNCTURE: CPT

## 2025-05-01 PROCEDURE — 80048 BASIC METABOLIC PNL TOTAL CA: CPT

## 2025-05-21 ENCOUNTER — OFFICE VISIT (OUTPATIENT)
Age: 30
End: 2025-05-21
Payer: COMMERCIAL

## 2025-05-21 VITALS
BODY MASS INDEX: 29.68 KG/M2 | DIASTOLIC BLOOD PRESSURE: 97 MMHG | WEIGHT: 212 LBS | HEART RATE: 79 BPM | HEIGHT: 71 IN | SYSTOLIC BLOOD PRESSURE: 144 MMHG

## 2025-05-21 DIAGNOSIS — N18.31 STAGE 3A CHRONIC KIDNEY DISEASE (HCC): Primary | ICD-10-CM

## 2025-05-21 PROCEDURE — 99203 OFFICE O/P NEW LOW 30 MIN: CPT | Performed by: SURGERY

## 2025-05-21 PROCEDURE — 3080F DIAST BP >= 90 MM HG: CPT | Performed by: SURGERY

## 2025-05-21 PROCEDURE — 3077F SYST BP >= 140 MM HG: CPT | Performed by: SURGERY

## 2025-05-21 NOTE — PROGRESS NOTES
General Surgery   [address] [id number] [facility], [facility] [address]  [place] [address]  [phone]  [email or web address]    Clinic Note - New Patient    Patient's Name: Ubaldo Sotomayor  MRN: [id number]  YOB: 1995 (29 y.o.)    Date of Visit: May 21, 2025     CC: Eval for PD catheter insertion    HPI: Ubaldo Sotomayor is a/an 29 y.o. male who presents to clinic for discussion of PD catheter insertion. Patient states he is currently in the process of being approved for a kidney transplant from a live donor.  Patient states he has an echo ordered for tomorrow.  If that goes well, he should be approved by Friday then the donor will start to get testing. Patient states he has not had surgeries on his abdomen in the past. Has hypertension but is on no blood thinners.  Patient states he does have anemia. Patient sees Dr. Cardoza for nephrology and states [facility] surgeon says he can get the PD catheter inserted if he wants it.        Past Medical History:   Diagnosis Date    Abnormal EKG     Anxiety     History of chicken pox 2002    Hyperlipidemia     Hypertension     OCD (obsessive compulsive disorder)     Stage 3a chronic kidney disease (HCC) 06/15/2022       Past Surgical History:   Procedure Laterality Date    CAPSULE ENDOSCOPY N/A 6/14/2021    ESOPHAGEAL CAPSULE ENDOSCOPY / BRAVO performed by Christopher Damon MD at [facility] ENDO    IR BIOPSY KIDNEY PERCUTANEOUS  9/1/2022    IR BIOPSY KIDNEY PERCUTANEOUS 9/1/2022 [facility] SPECIAL PROCEDURES    IR BIOPSY KIDNEY PERCUTANEOUS  9/2/2022    IR BIOPSY KIDNEY PERCUTANEOUS    UPPER GASTROINTESTINAL ENDOSCOPY N/A 9/2/2020    EGD BIOPSY performed by Christophre Damon MD at [facility] ENDO       Current Outpatient Medications   Medication Sig Dispense Refill    cloNIDine (CATAPRES) 0.1 MG tablet Take 1 tablet by mouth 2 times daily Please see your PCP for further management. 60 tablet 1    [Paused] LOKELMA 10 g PACK oral suspension TAKE 1 PACKET BY MOUTH TWO TIMES A

## 2025-05-28 ENCOUNTER — TELEPHONE (OUTPATIENT)
Age: 30
End: 2025-05-28

## 2025-05-28 DIAGNOSIS — N18.31 STAGE 3A CHRONIC KIDNEY DISEASE (HCC): Primary | ICD-10-CM

## 2025-05-28 NOTE — TELEPHONE ENCOUNTER
Pt is ready to schedule pd cath insertion. Please see nephrology's telephone encounter from yesterday. Let me know. Thanks!

## 2025-05-29 PROBLEM — N18.30 CKD (CHRONIC KIDNEY DISEASE) STAGE 3, GFR 30-59 ML/MIN (HCC): Status: ACTIVE | Noted: 2025-05-28

## 2025-05-29 NOTE — TELEPHONE ENCOUNTER
Case scheduled for 6/5/25 @140pm. Pt advised to arrive at the surgery center by 1140am. NPO for at least 8 hrs prior. Info mailed. Thanks!

## 2025-06-05 ENCOUNTER — HOSPITAL ENCOUNTER (OUTPATIENT)
Age: 30
Setting detail: OUTPATIENT SURGERY
Discharge: HOME | End: 2025-06-05
Attending: SURGERY | Admitting: SURGERY
Payer: COMMERCIAL

## 2025-06-05 ENCOUNTER — ANESTHESIA (OUTPATIENT)
Dept: OPERATING ROOM | Age: 30
End: 2025-06-05
Payer: COMMERCIAL

## 2025-06-05 ENCOUNTER — ANESTHESIA EVENT (OUTPATIENT)
Dept: OPERATING ROOM | Age: 30
End: 2025-06-05
Payer: COMMERCIAL

## 2025-06-05 VITALS
SYSTOLIC BLOOD PRESSURE: 143 MMHG | TEMPERATURE: 97.7 F | HEART RATE: 74 BPM | DIASTOLIC BLOOD PRESSURE: 99 MMHG | WEIGHT: 203 LBS | RESPIRATION RATE: 15 BRPM | HEIGHT: 71 IN | OXYGEN SATURATION: 100 % | BODY MASS INDEX: 28.42 KG/M2

## 2025-06-05 DIAGNOSIS — G89.18 POST-OP PAIN: Primary | ICD-10-CM

## 2025-06-05 LAB
BUN BLD-MCNC: 83 MG/DL (ref 8–26)
EGFR, POC: 6 ML/MIN/1.73M2
GLUCOSE BLD-MCNC: 89 MG/DL (ref 74–100)
POC CREATININE: 10.9 MG/DL (ref 0.51–1.19)
POTASSIUM BLD-SCNC: 5.3 MMOL/L (ref 3.5–4.5)

## 2025-06-05 PROCEDURE — 6360000002 HC RX W HCPCS

## 2025-06-05 PROCEDURE — 3700000001 HC ADD 15 MINUTES (ANESTHESIA): Performed by: SURGERY

## 2025-06-05 PROCEDURE — 6370000000 HC RX 637 (ALT 250 FOR IP): Performed by: ANESTHESIOLOGY

## 2025-06-05 PROCEDURE — 3600000004 HC SURGERY LEVEL 4 BASE: Performed by: SURGERY

## 2025-06-05 PROCEDURE — 2500000003 HC RX 250 WO HCPCS: Performed by: SURGERY

## 2025-06-05 PROCEDURE — 49326 LAP W/OMENTOPEXY ADD-ON: CPT | Performed by: SURGERY

## 2025-06-05 PROCEDURE — 7100000011 HC PHASE II RECOVERY - ADDTL 15 MIN: Performed by: SURGERY

## 2025-06-05 PROCEDURE — 6360000002 HC RX W HCPCS: Performed by: ANESTHESIOLOGY

## 2025-06-05 PROCEDURE — 93005 ELECTROCARDIOGRAM TRACING: CPT | Performed by: ANESTHESIOLOGY

## 2025-06-05 PROCEDURE — 7100000010 HC PHASE II RECOVERY - FIRST 15 MIN: Performed by: SURGERY

## 2025-06-05 PROCEDURE — 49324 LAP INSERT TUNNEL IP CATH: CPT | Performed by: SURGERY

## 2025-06-05 PROCEDURE — 2709999900 HC NON-CHARGEABLE SUPPLY: Performed by: SURGERY

## 2025-06-05 PROCEDURE — 6360000002 HC RX W HCPCS: Performed by: SURGERY

## 2025-06-05 PROCEDURE — 84520 ASSAY OF UREA NITROGEN: CPT

## 2025-06-05 PROCEDURE — 2500000003 HC RX 250 WO HCPCS

## 2025-06-05 PROCEDURE — 84132 ASSAY OF SERUM POTASSIUM: CPT

## 2025-06-05 PROCEDURE — 7100000000 HC PACU RECOVERY - FIRST 15 MIN: Performed by: SURGERY

## 2025-06-05 PROCEDURE — 3700000000 HC ANESTHESIA ATTENDED CARE: Performed by: SURGERY

## 2025-06-05 PROCEDURE — 82947 ASSAY GLUCOSE BLOOD QUANT: CPT

## 2025-06-05 PROCEDURE — 3600000014 HC SURGERY LEVEL 4 ADDTL 15MIN: Performed by: SURGERY

## 2025-06-05 PROCEDURE — 7100000001 HC PACU RECOVERY - ADDTL 15 MIN: Performed by: SURGERY

## 2025-06-05 PROCEDURE — 82565 ASSAY OF CREATININE: CPT

## 2025-06-05 PROCEDURE — C1750 CATH, HEMODIALYSIS,LONG-TERM: HCPCS | Performed by: SURGERY

## 2025-06-05 PROCEDURE — 2580000003 HC RX 258

## 2025-06-05 RX ORDER — BUPIVACAINE HCL/EPINEPHRINE 0.5-1:200K
VIAL (ML) INJECTION PRN
Status: DISCONTINUED | OUTPATIENT
Start: 2025-06-05 | End: 2025-06-05 | Stop reason: HOSPADM

## 2025-06-05 RX ORDER — LABETALOL HYDROCHLORIDE 5 MG/ML
10 INJECTION, SOLUTION INTRAVENOUS
Status: DISCONTINUED | OUTPATIENT
Start: 2025-06-05 | End: 2025-06-05 | Stop reason: HOSPADM

## 2025-06-05 RX ORDER — SODIUM CHLORIDE 9 MG/ML
INJECTION, SOLUTION INTRAVENOUS
Status: DISCONTINUED | OUTPATIENT
Start: 2025-06-05 | End: 2025-06-05 | Stop reason: SDUPTHER

## 2025-06-05 RX ORDER — LIDOCAINE HYDROCHLORIDE 10 MG/ML
INJECTION, SOLUTION EPIDURAL; INFILTRATION; INTRACAUDAL; PERINEURAL
Status: DISCONTINUED | OUTPATIENT
Start: 2025-06-05 | End: 2025-06-05 | Stop reason: SDUPTHER

## 2025-06-05 RX ORDER — SODIUM CHLORIDE 0.9 % (FLUSH) 0.9 %
5-40 SYRINGE (ML) INJECTION EVERY 12 HOURS SCHEDULED
Status: DISCONTINUED | OUTPATIENT
Start: 2025-06-05 | End: 2025-06-05 | Stop reason: HOSPADM

## 2025-06-05 RX ORDER — MAGNESIUM HYDROXIDE 1200 MG/15ML
LIQUID ORAL CONTINUOUS PRN
Status: DISCONTINUED | OUTPATIENT
Start: 2025-06-05 | End: 2025-06-05 | Stop reason: HOSPADM

## 2025-06-05 RX ORDER — ROCURONIUM BROMIDE 10 MG/ML
INJECTION, SOLUTION INTRAVENOUS
Status: DISCONTINUED | OUTPATIENT
Start: 2025-06-05 | End: 2025-06-05 | Stop reason: SDUPTHER

## 2025-06-05 RX ORDER — MIDAZOLAM HYDROCHLORIDE 1 MG/ML
INJECTION, SOLUTION INTRAMUSCULAR; INTRAVENOUS
Status: DISCONTINUED | OUTPATIENT
Start: 2025-06-05 | End: 2025-06-05 | Stop reason: SDUPTHER

## 2025-06-05 RX ORDER — FENTANYL CITRATE 50 UG/ML
INJECTION, SOLUTION INTRAMUSCULAR; INTRAVENOUS
Status: DISCONTINUED | OUTPATIENT
Start: 2025-06-05 | End: 2025-06-05 | Stop reason: SDUPTHER

## 2025-06-05 RX ORDER — HEPARIN 100 UNIT/ML
SYRINGE INTRAVENOUS PRN
Status: DISCONTINUED | OUTPATIENT
Start: 2025-06-05 | End: 2025-06-05 | Stop reason: ALTCHOICE

## 2025-06-05 RX ORDER — SODIUM CHLORIDE 9 MG/ML
INJECTION, SOLUTION INTRAVENOUS PRN
Status: DISCONTINUED | OUTPATIENT
Start: 2025-06-05 | End: 2025-06-05 | Stop reason: HOSPADM

## 2025-06-05 RX ORDER — PROPOFOL 10 MG/ML
INJECTION, EMULSION INTRAVENOUS
Status: DISCONTINUED | OUTPATIENT
Start: 2025-06-05 | End: 2025-06-05 | Stop reason: SDUPTHER

## 2025-06-05 RX ORDER — SENNA AND DOCUSATE SODIUM 50; 8.6 MG/1; MG/1
1 TABLET, FILM COATED ORAL DAILY
Qty: 30 TABLET | Refills: 0 | Status: SHIPPED | OUTPATIENT
Start: 2025-06-05

## 2025-06-05 RX ORDER — ONDANSETRON 2 MG/ML
4 INJECTION INTRAMUSCULAR; INTRAVENOUS
Status: DISCONTINUED | OUTPATIENT
Start: 2025-06-05 | End: 2025-06-05 | Stop reason: HOSPADM

## 2025-06-05 RX ORDER — SODIUM CHLORIDE 0.9 % (FLUSH) 0.9 %
5-40 SYRINGE (ML) INJECTION PRN
Status: DISCONTINUED | OUTPATIENT
Start: 2025-06-05 | End: 2025-06-05 | Stop reason: HOSPADM

## 2025-06-05 RX ORDER — OXYCODONE HYDROCHLORIDE 5 MG/1
5 TABLET ORAL
Status: COMPLETED | OUTPATIENT
Start: 2025-06-05 | End: 2025-06-05

## 2025-06-05 RX ORDER — HYDRALAZINE HYDROCHLORIDE 20 MG/ML
10 INJECTION INTRAMUSCULAR; INTRAVENOUS
Status: DISCONTINUED | OUTPATIENT
Start: 2025-06-05 | End: 2025-06-05 | Stop reason: HOSPADM

## 2025-06-05 RX ORDER — NALOXONE HYDROCHLORIDE 0.4 MG/ML
INJECTION, SOLUTION INTRAMUSCULAR; INTRAVENOUS; SUBCUTANEOUS PRN
Status: DISCONTINUED | OUTPATIENT
Start: 2025-06-05 | End: 2025-06-05 | Stop reason: HOSPADM

## 2025-06-05 RX ORDER — PHENYLEPHRINE HCL IN 0.9% NACL 1 MG/10 ML
SYRINGE (ML) INTRAVENOUS
Status: DISCONTINUED | OUTPATIENT
Start: 2025-06-05 | End: 2025-06-05 | Stop reason: SDUPTHER

## 2025-06-05 RX ORDER — ONDANSETRON 2 MG/ML
INJECTION INTRAMUSCULAR; INTRAVENOUS
Status: DISCONTINUED | OUTPATIENT
Start: 2025-06-05 | End: 2025-06-05 | Stop reason: SDUPTHER

## 2025-06-05 RX ORDER — OXYCODONE HYDROCHLORIDE 5 MG/1
5 TABLET ORAL EVERY 6 HOURS PRN
Qty: 28 TABLET | Refills: 0 | Status: SHIPPED | OUTPATIENT
Start: 2025-06-05 | End: 2025-06-12

## 2025-06-05 RX ORDER — IPRATROPIUM BROMIDE AND ALBUTEROL SULFATE 2.5; .5 MG/3ML; MG/3ML
1 SOLUTION RESPIRATORY (INHALATION)
Status: DISCONTINUED | OUTPATIENT
Start: 2025-06-05 | End: 2025-06-05 | Stop reason: HOSPADM

## 2025-06-05 RX ORDER — CEFAZOLIN SODIUM 1 G/3ML
INJECTION, POWDER, FOR SOLUTION INTRAMUSCULAR; INTRAVENOUS
Status: DISCONTINUED | OUTPATIENT
Start: 2025-06-05 | End: 2025-06-05 | Stop reason: SDUPTHER

## 2025-06-05 RX ADMIN — FENTANYL CITRATE 100 MCG: 50 INJECTION, SOLUTION INTRAMUSCULAR; INTRAVENOUS at 12:59

## 2025-06-05 RX ADMIN — LIDOCAINE HYDROCHLORIDE 50 MG: 10 INJECTION, SOLUTION EPIDURAL; INFILTRATION; INTRACAUDAL; PERINEURAL at 12:59

## 2025-06-05 RX ADMIN — ROCURONIUM BROMIDE 50 MG: 10 INJECTION INTRAVENOUS at 12:59

## 2025-06-05 RX ADMIN — OXYCODONE 5 MG: 5 TABLET ORAL at 16:13

## 2025-06-05 RX ADMIN — PROPOFOL 150 MG: 10 INJECTION, EMULSION INTRAVENOUS at 12:59

## 2025-06-05 RX ADMIN — MIDAZOLAM 2 MG: 1 INJECTION INTRAMUSCULAR; INTRAVENOUS at 12:57

## 2025-06-05 RX ADMIN — CEFAZOLIN 2 G: 1 INJECTION, POWDER, FOR SOLUTION INTRAMUSCULAR; INTRAVENOUS at 13:11

## 2025-06-05 RX ADMIN — HYDROMORPHONE HYDROCHLORIDE 0.5 MG: 1 INJECTION, SOLUTION INTRAMUSCULAR; INTRAVENOUS; SUBCUTANEOUS at 14:08

## 2025-06-05 RX ADMIN — ONDANSETRON 4 MG: 2 INJECTION INTRAMUSCULAR; INTRAVENOUS at 13:39

## 2025-06-05 RX ADMIN — Medication 100 MCG: at 13:41

## 2025-06-05 RX ADMIN — SODIUM CHLORIDE: 9 INJECTION, SOLUTION INTRAVENOUS at 12:56

## 2025-06-05 RX ADMIN — HYDROMORPHONE HYDROCHLORIDE 0.5 MG: 1 INJECTION, SOLUTION INTRAMUSCULAR; INTRAVENOUS; SUBCUTANEOUS at 15:29

## 2025-06-05 ASSESSMENT — PAIN SCALES - GENERAL
PAINLEVEL_OUTOF10: 5
PAINLEVEL_OUTOF10: 4
PAINLEVEL_OUTOF10: 7
PAINLEVEL_OUTOF10: 4
PAINLEVEL_OUTOF10: 10
PAINLEVEL_OUTOF10: 4
PAINLEVEL_OUTOF10: 4
PAINLEVEL_OUTOF10: 7
PAINLEVEL_OUTOF10: 4

## 2025-06-05 ASSESSMENT — PAIN DESCRIPTION - LOCATION
LOCATION: ABDOMEN

## 2025-06-05 ASSESSMENT — ENCOUNTER SYMPTOMS
ABDOMINAL PAIN: 0
SHORTNESS OF BREATH: 0

## 2025-06-05 ASSESSMENT — PAIN - FUNCTIONAL ASSESSMENT
PAIN_FUNCTIONAL_ASSESSMENT: 0-10
PAIN_FUNCTIONAL_ASSESSMENT: NONE - DENIES PAIN

## 2025-06-05 NOTE — ANESTHESIA PRE PROCEDURE
Department of Anesthesiology  Preprocedure Note       Name:  Ubaldo Sotomayor   Age:  30 y.o.  :  1995                                          MRN:  [id number]         Date:  2025      Surgeon: Surgeon(s):  Angeles Gurrola MD    Procedure: Procedure(s):  LAPAROSCOPIC PERITONEAL DIALYSIS  CATHETER INSERTION WITH OMENTOPEXY    Medications prior to admission:   Prior to Admission medications    Medication Sig Start Date End Date Taking? Authorizing Provider   sodium polystyrene (KAYEXALATE) 15 GM/60ML suspension Take 60 mLs by mouth daily 25  Yes Ham Cardoza MD   cloNIDine (CATAPRES) 0.1 MG tablet Take 1 tablet by mouth 2 times daily Please see your PCP for further management. 25  Yes Petra Grant MD   sevelamer (RENVELA) 800 MG tablet Take 1 tablet by mouth 3 times daily (with meals) 25  Yes Ham Cardoza MD   doxazosin (CARDURA) 4 MG tablet Take 1 tablet by mouth 2 times daily 25  Yes Ham Cardoza MD   carvedilol (COREG) 25 MG tablet Take 1 tablet by mouth 2 times daily 25  Yes Ham Cardoza MD   amLODIPine (NORVASC) 10 MG tablet Take 1 tablet by mouth daily 24 Yes Demetria Parisi MD   atorvastatin (LIPITOR) 20 MG tablet Take 1 tablet by mouth daily 24 Yes Demetria Parisi MD   montelukast (SINGULAIR) 10 MG tablet Take 1 tablet by mouth daily 24  Yes Demetria Parisi MD   Melatonin 10 MG TABS Take 10 mg by mouth nightly as needed   Yes Britney Crooks MD   amitriptyline (ELAVIL) 50 MG tablet Take 1 tablet by mouth every evening 22  Yes Britney Crooks MD   sertraline (ZOLOFT) 100 MG tablet Take 2 tablets by mouth 17  Yes Britney Crooks MD       Current medications:    No current facility-administered medications for this encounter.       Allergies:    Allergies   Allergen Reactions   • Nsaids Other (See Comments)     Due to kidneys   • Uncaria

## 2025-06-05 NOTE — H&P
General Surgery  H&P    PATIENT NAME: Ubaldo Sotomayor  AGE: 30 y.o.  MEDICAL RECORD NO. [id number]  DATE: 6/5/2025  SURGEON: Dr. Gurrola  PRIMARY CARE PHYSICIAN: Demetria Parisi MD    IMPRESSION:     Patient Active Problem List   Diagnosis    Hypertension, essential    Other hyperlipidemia    Generalized anxiety disorder    Major depressive disorder, recurrent episode, moderate (HCC)    Stage 3a chronic kidney disease (HCC)    Left flank pain    UMANG (acute kidney injury)    Acute kidney failure    Persistent proteinuria    Acute worsening of stage 4 chronic kidney disease (HCC)    Acute kidney injury superimposed on CKD    CKD (chronic kidney disease) stage 5, GFR less than 15 ml/min (HCC)    Hypokalemia    LO positive    CKD (chronic kidney disease) stage 3, GFR 30-59 ml/min (HCC)   Presents for peritoneal dialysis catheter placement      PLAN:   All risks of abdominal infection, bleeding, injury to other structures, need for revision discussed with patient. They understand and wish to proceed with planned procedure. All questions asked and answered. Consent signed. Will proceed with planned procedure.       HISTORY:   History of Chief Complaint:    Ubaldo Sotomayor is a 30 y.o. male who presents with ESRD requiring dialysis. Plan or PD cath placement today.          Past Medical History   has a past medical history of Abnormal EKG, Anxiety, History of chicken pox, Hyperlipidemia, Hypertension, OCD (obsessive compulsive disorder), Stage 3a chronic kidney disease (HCC), Wears contact lenses, Wears glasses, and Wellness examination.  Past Surgical History   has a past surgical history that includes Upper gastrointestinal endoscopy (N/A, 9/2/2020); Capsule endoscopy (N/A, 6/14/2021); IR BIOPSY KIDNEY PERCUTANEOUS (9/1/2022); and IR BIOPSY KIDNEY PERCUTANEOUS (9/2/2022).  Medications  Prior to Admission medications    Medication Sig Start Date End Date Taking? Authorizing Provider   sodium polystyrene    Psychiatric/Behavioral:  Negative for behavioral problems.          PHYSICAL:   VITALS:  height is 1.803 m (5' 11\") and weight is 92.1 kg (203 lb). His temporal temperature is 97 °F (36.1 °C). His blood pressure is 150/103 (abnormal) and his pulse is 77. His respiration is 20 and oxygen saturation is 100%.   Physical Exam  Constitutional:       General: He is not in acute distress.     Appearance: He is not ill-appearing or toxic-appearing.   HENT:      Head: Normocephalic and atraumatic.      Nose: Nose normal.      Mouth/Throat:      Mouth: Mucous membranes are moist.   Eyes:      Extraocular Movements: Extraocular movements intact.   Pulmonary:      Effort: Pulmonary effort is normal. No respiratory distress.   Abdominal:      Palpations: Abdomen is soft.   Neurological:      General: No focal deficit present.      Mental Status: He is alert and oriented to person, place, and time.   Psychiatric:         Mood and Affect: Mood normal.         Behavior: Behavior normal.           LABS:     Recent Labs     06/05/25  1213   CREATININE 10.9*     No results for input(s): \"ALKPHOS\", \"ALT\", \"AST\", \"BILITOT\", \"BILIDIR\", \"LABALBU\", \"AMYLASE\", \"LIPASE\" in the last 72 hours.      RADIOLOGY:     No results found.      Thank you for the interesting evaluation. Further recommendations to follow.    Magali Oscar DO  6/5/2025, 12:42 PM

## 2025-06-05 NOTE — OP NOTE
future issues with the PD catheter.  A small incision was made in the left upper quadrant a Leroy Jordan was utilized to insert an 0 Vicryl stitch and was looped around the omentum this was then secured.  The omentum was then assessed and was well-positioned.  At this time a 5 mm infraumbilical incision was made through which a cutting port was introduced to form the tract for the PD catheter it was visualized entering the abdomen in the suprapubic space, the PD catheter was then placed pigtail and first through the trocar.  Atraumatic Graspers were then utilized to hold the PD catheter in place while the trocar was removed.  Graspers were then used to place the catheter within the retrovesicular space.  A 3 mm incision was then made in the left lower quadrant lateral to the previous 5 mm infraumbilical incision as the planned exit site for the PD cath.  A metal tunneler was then used to tunnel the PD catheter from the 5 mm incision laterally to the 3 mm incision.  The intra-abdominal PD catheter was then again visualized with laparoscope to ensure it remained within the proper positioning.  All laparoscopic instruments were then removed and the abdomen was then desufflated then instilled with 1 L sterile saline via gravity.  The catheter was then placed to gravity through which 800 cc of the 1 L saline was evacuated confirming functionality of PD catheter.  This was then flushed with heparin.  All incisions were injected with local and closed with simple interrupted subcuticular 4-0 Monocryl and covered with Dermabond.  The PD catheter was then padded with fluffs and covered with Tegaderm.    This concluded the procedure, all counts were correct at the completion.  The patient tolerated the procedure well and was extubated and taken to PACU in stable condition.    Dr. Gurrola was present and scrubbed for the entirety of the procedure.      Electronically signed by Magali Oscar DO on 6/5/2025 at 2:41 PM

## 2025-06-06 NOTE — ANESTHESIA POSTPROCEDURE EVALUATION
Department of Anesthesiology  Postprocedure Note    Patient: Ubaldo Sotomayor  MRN: [id number]  YOB: 1995  Date of evaluation: 6/6/2025    Procedure Summary       Date: 06/05/25 Room / Location: [address]    Anesthesia Start: 1256 Anesthesia Stop: 1404    Procedure: LAPAROSCOPIC PERITONEAL DIALYSIS  CATHETER INSERTION WITH OMENTOPEXY (Abdomen) Diagnosis:       CKD (chronic kidney disease) stage 3, GFR 30-59 ml/min ([facility])      (CKD (chronic kidney disease) stage 3, GFR 30-59 ml/min ([facility]) [N18.30])    Surgeons: Angeles Gurrola MD Responsible Provider: Elo Donaldson MD    Anesthesia Type: general ASA Status: 3            Anesthesia Type: No value filed.    Ashia Phase I: Ashia Score: 10    Ashia Phase II: Ashia Score: 10    Anesthesia Post Evaluation    Patient location during evaluation: PACU  Patient participation: complete - patient participated  Level of consciousness: awake and alert  Pain score: 2  Airway patency: patent  Nausea & Vomiting: no nausea and no vomiting  Cardiovascular status: hemodynamically stable  Respiratory status: acceptable  Hydration status: euvolemic  Pain management: adequate    No notable events documented.

## 2025-06-07 LAB
EKG ATRIAL RATE: 72 BPM
EKG P AXIS: -8 DEGREES
EKG P-R INTERVAL: 138 MS
EKG Q-T INTERVAL: 390 MS
EKG QRS DURATION: 90 MS
EKG QTC CALCULATION (BAZETT): 427 MS
EKG R AXIS: -10 DEGREES
EKG T AXIS: -2 DEGREES
EKG VENTRICULAR RATE: 72 BPM

## 2025-06-07 PROCEDURE — 93010 ELECTROCARDIOGRAM REPORT: CPT | Performed by: INTERNAL MEDICINE

## 2025-06-19 ENCOUNTER — TELEPHONE (OUTPATIENT)
Age: 30
End: 2025-06-19

## 2025-06-19 NOTE — TELEPHONE ENCOUNTER
Carlie contacted office to report that patient is having increased pain with drainage, toward the end of the drainage. Please advise on what day to have patient come in for evaluation?

## 2025-06-25 ENCOUNTER — OFFICE VISIT (OUTPATIENT)
Age: 30
End: 2025-06-25
Attending: SURGERY
Payer: COMMERCIAL

## 2025-06-25 ENCOUNTER — HOSPITAL ENCOUNTER (OUTPATIENT)
Dept: CT IMAGING | Age: 30
Discharge: HOME | End: 2025-06-27
Attending: SURGERY
Payer: COMMERCIAL

## 2025-06-25 VITALS
HEART RATE: 74 BPM | DIASTOLIC BLOOD PRESSURE: 97 MMHG | SYSTOLIC BLOOD PRESSURE: 140 MMHG | WEIGHT: 203 LBS | HEIGHT: 71 IN | BODY MASS INDEX: 28.42 KG/M2

## 2025-06-25 DIAGNOSIS — T85.611A PERITONEAL DIALYSIS CATHETER DYSFUNCTION, INITIAL ENCOUNTER: Primary | ICD-10-CM

## 2025-06-25 DIAGNOSIS — T85.611A PERITONEAL DIALYSIS CATHETER DYSFUNCTION, INITIAL ENCOUNTER: ICD-10-CM

## 2025-06-25 PROCEDURE — 74176 CT ABD & PELVIS W/O CONTRAST: CPT

## 2025-06-25 PROCEDURE — 99024 POSTOP FOLLOW-UP VISIT: CPT | Performed by: SURGERY

## 2025-06-25 NOTE — PROGRESS NOTES
General Surgery   [id number] [facility], [facility] [address]  [place] [address]  [phone]  [address] [place] [address]  [phone]  [email or web address]    Clinic Note - Post-op Patient      Patient: Ubaldo Sotomayor  MRN: [id number]  YOB: 1995 (30 y.o.)    Date of Office Visit: June 25, 2025     CC: Post op follow up    SUBJECTIVE:  Ubaldo Sotomayor is a 30 y.o. male who is seen at the [facility] surgery clinic for post op follow up from PD cath insertion 6/5.    He states he is having some pain when the fluid is going in and pain when they try to drain. He states that the catheter is functioning in that they can put the fluid in and it does drain however they had to stop draining due to his severe pain. He states the pain is sharp and seems to be in his pelvis and radiating down his groin.   Denies any leakage from around the catheter.     Physical Exam:    There were no vitals filed for this visit.  Physical Exam  Constitutional:       Appearance: Normal appearance.   HENT:      Head: Normocephalic and atraumatic.      Nose: Nose normal.      Mouth/Throat:      Mouth: Mucous membranes are dry.   Pulmonary:      Effort: Pulmonary effort is normal.      Breath sounds: Normal breath sounds.   Abdominal:      General: Abdomen is flat.      Palpations: Abdomen is soft.      Comments: Catheter is in place with no abnormalities.   No groin hernias palpated.   No swelling in groin.    Skin:     General: Skin is warm and dry.      Capillary Refill: Capillary refill takes less than 2 seconds.   Neurological:      General: No focal deficit present.      Mental Status: He is alert.               Assessment/Plan:    S/p PD cath placement       Discussed that this is likely drain pain. Since the catheter is working it is unlikely that it is stuck to anything intra abdominally. We will obtain a non-con CT scan however just to ensure the catheter is in proper position.

## 2025-08-08 ENCOUNTER — HOSPITAL ENCOUNTER (OUTPATIENT)
Dept: LAB | Age: 30
Discharge: HOME | End: 2025-08-08
Payer: COMMERCIAL

## 2025-08-08 DIAGNOSIS — Z91.89 RISK OF EXPOSURE TO LYME DISEASE: ICD-10-CM

## 2025-08-08 PROCEDURE — 86617 LYME DISEASE ANTIBODY: CPT

## 2025-08-08 PROCEDURE — 36415 COLL VENOUS BLD VENIPUNCTURE: CPT

## 2025-08-10 LAB
B BURGDOR IGG SER QL IB: NEGATIVE
B BURGDOR IGM SER QL IB: NEGATIVE

## 2025-08-11 ENCOUNTER — RESULTS FOLLOW-UP (OUTPATIENT)
Dept: FAMILY MEDICINE CLINIC | Age: 30
End: 2025-08-11

## (undated) DEVICE — BRAVO CF CAPSULE  DELIVERY DEV, 5-PK: Brand: BRAVO

## (undated) DEVICE — TROCAR: Brand: KII SHIELDED BLADED ACCESS SYSTEM

## (undated) DEVICE — DEFENDO AIR WATER SUCTION AND BIOPSY VALVE KIT FOR  OLYMPUS: Brand: DEFENDO AIR/WATER/SUCTION AND BIOPSY VALVE

## (undated) DEVICE — PERITONEAL DIALYSIS CATHETER KIT, SWAN NECK CURL CATH, 2 CUFFS LEFT: Brand: ARGYLE

## (undated) DEVICE — STRAP,POSITIONING,KNEE/BODY,FOAM,4X60": Brand: MEDLINE

## (undated) DEVICE — CANNULA NSL AD L2IN ETCO2 SAMP SFT CRUSH RESIST FEM AIRLFE

## (undated) DEVICE — BINDER ABD UNISX 9IN 62IN L AND XL UNIV

## (undated) DEVICE — ENDO KIT W/SYRINGE: Brand: MEDLINE INDUSTRIES, INC.

## (undated) DEVICE — DRESSING TRNSPAR W5XL4.5IN FLM SHT SEMIPERMEABLE WIND

## (undated) DEVICE — SUTURE VICRYL + SZ 0 L27IN ABSRB VLT L26MM UR-6 5/8 CIR VCP603H

## (undated) DEVICE — BITEBLOCK 54FR W/ DENT RIM BLOX

## (undated) DEVICE — ADAPTER DLYS TI LOK ADLOK 2 PC

## (undated) DEVICE — SUTURE MONOCRYL + SZ 4 0 L18IN ABSRB UD PC 3 L16MM 3 8 CIR PRIM MCP845G

## (undated) DEVICE — TROCAR: Brand: KII® SLEEVE

## (undated) DEVICE — 1LYRTR 16FR10ML100%SIL UMS SNP: Brand: MEDLINE INDUSTRIES, INC.

## (undated) DEVICE — GARMENT,MEDLINE,DVT,INT,CALF,MED, GEN2: Brand: MEDLINE

## (undated) DEVICE — LIQUIBAND RAPID ADHESIVE 36/CS 0.8ML: Brand: MEDLINE

## (undated) DEVICE — Device

## (undated) DEVICE — INSUFFLATION NEEDLE TO ESTABLISH PNEUMOPERITONEUM.: Brand: INSUFFLATION NEEDLE

## (undated) DEVICE — DEVICE TRCR 12X9X3IN WHT CLSR DISP OMNICLOSE

## (undated) DEVICE — GOWN,SIRUS,NONRNF,SETINSLV,2XL,18/CS: Brand: MEDLINE

## (undated) DEVICE — 3M™ IOBAN™ 2 ANTIMICROBIAL INCISE DRAPE 6650EZ: Brand: IOBAN™ 2

## (undated) DEVICE — STRAP ARMBRD W1.5XL32IN FOAM STR YET SFT W/ HK AND LOOP

## (undated) DEVICE — SYRINGE MED 10ML LUERLOCK TIP W/O SFTY DISP

## (undated) DEVICE — GAUZE,SPONGE,4"X4",16PLY,XRAY,STRL,LF: Brand: MEDLINE

## (undated) DEVICE — PRESTERNAL PERITONEAL DIALYSIS CATHETER KIT,SWAN NECK, 2 CUFFS, CURL CATH, 1 CUFF: Brand: ARGYLE

## (undated) DEVICE — CYSTO/BLADDER IRRIGATION SET, REGULATING CLAMP

## (undated) DEVICE — GLOVE SURG SZ 8 L11.77IN FNGR THK9.8MIL STRW LTX POLYMER

## (undated) DEVICE — SYSTEM POS SZ 36 X 20 X 1 IN INTEGR ADJ ARM PROTECTOR LIFT

## (undated) DEVICE — FORCEPS BX L240CM JAW DIA2.8MM L CAP W/ NDL MIC MESH TOOTH